# Patient Record
Sex: MALE | Race: WHITE | Employment: OTHER | URBAN - METROPOLITAN AREA
[De-identification: names, ages, dates, MRNs, and addresses within clinical notes are randomized per-mention and may not be internally consistent; named-entity substitution may affect disease eponyms.]

---

## 2017-05-12 ENCOUNTER — OFFICE VISIT (OUTPATIENT)
Dept: NEUROLOGY | Facility: CLINIC | Age: 72
End: 2017-05-12

## 2017-05-12 VITALS
DIASTOLIC BLOOD PRESSURE: 54 MMHG | WEIGHT: 170 LBS | HEIGHT: 68 IN | HEART RATE: 58 BPM | SYSTOLIC BLOOD PRESSURE: 115 MMHG | BODY MASS INDEX: 25.76 KG/M2

## 2017-05-12 DIAGNOSIS — G40.309: Primary | ICD-10-CM

## 2017-05-12 RX ORDER — FAMOTIDINE 20 MG
2000 TABLET ORAL DAILY
COMMUNITY
End: 2020-01-15

## 2017-05-12 RX ORDER — HYDROCODONE BITARTRATE AND ACETAMINOPHEN 5; 325 MG/1; MG/1
1 TABLET ORAL PRN
COMMUNITY
Start: 2017-01-03 | End: 2020-01-15

## 2017-05-12 NOTE — PROGRESS NOTES
INTERVAL HISTORY:  No Sz according to Mr Toney and note from NH.    HISTORY OF PRESENT ILLNESS rev 5/12/17: Josiah Toney has been a patient of mine since the early 1990s.  He has 4  volumesof paper records at [facility].  Initially, when I first began to see him, he was having at least monthly generalized tonic-clonic seizures which required emergency room visits.  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  He was hospitalized at [facility] in 07/ 1992.  At that time, he was also felt to have a possible neurodegenerative disorder.  It is not clear when his seizures actually began, but they have been present for many years.  At that time in 1992, he was also depressed; and Mysoline was discontinued because it might have been contributing to his depression.  He had been on Dilantin and Depakote.  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 at that time.  He had dramatic results with this medication.  His emergency room visits dropped to 0 from an average of roughly 12 per year prior.  We were also able to discontinue his Dilantin, and this may also have been beneficial.  Over the years, Dino has had excellent seizure control; but, in the last few years, he developed a number of medical problems in addition to his epilepsy.      OTHER ILLNESSES/PROCEDURES:     1.  Stroke in 10/2010 which left him with left-sided weakness.     2.  He also developed deep vein thrombosis.     3.  The patient's patent foramen ovale was diagnosed in 10/2010.   4.  He has had kidney stones for which lithotripsy has been done.  This may have been a side effect of zonisamide.      Recently, Coumadin has discontinued.      PREVIOUS EVALUATIONS:     The most recent EEG 07/07/2010 at [facility] showed interictal activity consisting of high-amplitude spike-and-wave  "discharges which were seen bifrontally.  These lasted less than 2 seconds.   MRI scan 07/21/2010 at [facility] revealed prominent cerebral sulci and mild cerebellar atrophy.   Neuropsych testing done on 11/10/1998 revealed a Full Scale IQ of 80.      REVIEW OF SYSTEMS:  Done with Dino, who has very limited speech. He is room- and wheelchair-bound, so it is hard to assess activity.      PHYSICAL EXAMINATION:  Blood pressure 115/54, pulse 58, height 5' 7.91\" (172.5 cm), weight 170 lb (77.1 kg).  Dino has deteriorated quite a bit in the last year.  He is now wheelchair-bound.  He has very marked kyphosis.  He speaks very softly but he has a good grasp on history . But he is more alert and speaks more clearly since last visit.  NEUROLOGICAL EXAMINATION:    MENTAL STATUS:  Speech is limited to short but relevant sentences. Mentally reasonably sharp- Josiah has bilaterally  strong , slightly worse on right.   CRANIAL NERVES:  Extraocular movements are full.  He is not able to fully cooperate with visual field testing.  Facial movements, slight  Right facial central weakness.  Hearing is decreased; has hearing aid in L ear. He is unable to cooperate with gait ; finger nose finger moderate dysmetria.   ASSESSMENT:  Dino has certainly deteriorated in general physical condition over the last year.  However, he has had a great deal of return of motor strength from his stroke.   PLAN:   No med changes.   RTC 9 mo      "

## 2017-05-12 NOTE — MR AVS SNAPSHOT
"              After Visit Summary   2017    Josiah Toney    MRN: [id number]           Patient Information     Date Of Birth          1945        Visit Information        Provider Department      2017 10:00 AM Lindsay Caban MD [facility]         Follow-ups after your visit        Follow-up notes from your care team     Return in about 9 months (around 2018).      Who to contact     Please call your clinic at [phone] to:    Ask questions about your health    Make or cancel appointments    Discuss your medicines    Learn about your test results    Speak to your doctor   If you have compliments or concerns about an experience at your clinic, or if you wish to file a complaint, please contact [facility] Patient Relations at [phone] or email us at [email or web address]         Additional Information About Your Visit        MyChart Information     Generate is an electronic gateway that provides easy, online access to your medical records. With Generate, you can request a clinic appointment, read your test results, renew a prescription or communicate with your care team.     To sign up for [employer] visit the website at [email or web address]   You will be asked to enter the access code listed below, as well as some personal information. Please follow the directions to create your username and password.     Your access code is: [email or web address]  Expires: 8/10/2017 11:48 AM     Your access code will  in 90 days. If you need help or a new code, please contact your [facility] or call [phone] for assistance.        Care EveryWhere ID     This is your Care EveryWhere ID. This could be used by other organizations to access your [facility] medical records  [id number]        Your Vitals Were     Pulse Height BMI (Body Mass Index)             58 5' 7.91\" (172.5 cm) 25.92 kg/m2          Blood Pressure from Last 3 " Encounters:   05/12/17 115/54   11/10/16 118/63   05/16/16 139/66    Weight from Last 3 Encounters:   05/12/17 170 lb (77.1 kg)   09/03/15 185 lb 12.8 oz (84.3 kg)   05/29/15 179 lb 12.8 oz (81.6 kg)              Today, you had the following     No orders found for display         Today's Medication Changes          These changes are accurate as of: 5/12/17 11:48 AM.  If you have any questions, ask your nurse or doctor.               Stop taking these medicines if you haven't already. Please contact your care team if you have questions.     ALBUTEROL IN   Stopped by:  Lindsay Caban MD           calcium-vitamin D 600-400 MG-UNIT per tablet   Commonly known as:  CALTRATE   Stopped by:  Lindsay Caban MD           celeXA 10 MG tablet   Generic drug:  citalopram   Stopped by:  Lindsay Caban MD           COUMADIN 4 MG tablet   Generic drug:  warfarin   Stopped by:  Lindsay Caban MD           ERGOCALCIFEROL PO   Stopped by:  Lindsay Caban MD           FLOMAX PO   Stopped by:  Lindsay Caban MD           fluticasone 50 MCG/ACT spray   Commonly known as:  FLONASE   Stopped by:  Lindsay Caban MD           nystatin Powd   Stopped by:  Lindsay Caban MD           pantoprazole 20 MG EC tablet   Commonly known as:  PROTONIX   Stopped by:  Lindsay Caban MD           saliva substitute Soln solution   Stopped by:  Lindsay Caban MD                    Primary Care Provider Office Phone # Fax #    Shania ZANDRA Wyatt [phone]       [facility] MEDICAL  [facility] [address]        Thank you!     Thank you for choosing [facility]  for your care. Our goal is always to provide you with excellent care. Hearing back from our patients is one way we can continue to improve our services. Please take a few minutes to complete the written survey that you may receive in the mail after your visit with us. Thank you!             Your Updated Medication List - Protect others around you: Learn how to safely  use, store and throw away your medicines at www.disposemymeds.org.          This list is accurate as of: 5/12/17 11:48 AM.  Always use your most recent med list.                   Brand Name Dispense Instructions for use    Calcium Carbonate 200 MG Caps      Take by mouth 2 times daily       glycopyrrolate 1 MG tablet    ROBINUL     Take 1 mg by mouth 3 times daily       HYDROcodone-acetaminophen 5-325 MG per tablet    NORCO     Take 1 tablet by mouth as needed       MIRALAX powder   Generic drug:  polyethylene glycol      Take  by mouth 2 times daily.       perampanel 2 MG tablet    FYCOMPA    60 tablet    Tahe 1 tablet hs for 3 weeks, then 2 tablets HS  (tabs are 2 mg each)       PROSCAR PO      Take 5 mg by mouth daily       RANITIDINE HCL PO          sennosides 8.6 MG tablet    SENOKOT     Take 1 tablet by mouth daily       simvastatin 20 MG tablet    ZOCOR     Take  by mouth At Bedtime.       TYLENOL PO      Take  by mouth. Max dose 4,000 mg per 24 hours       valproic acid 250 MG capsule    DEPAKENE    180 capsule    Take 2 capsules (500mg) by mouth in the morning and 4 capsules (1000mg) by mouth in the evening.       Vitamin D (Cholecalciferol) 1000 UNITS Caps      Take 2,000 Units by mouth daily       ZONEGRAN 100 MG capsule   Generic drug:  zonisamide      Take  by mouth daily. 200 mg am, 300 mg pm.

## 2017-05-12 NOTE — LETTER
2017       RE: Josiah Toney  : 1945   MRN: [id number]      Dear Colleague,    Thank you for referring your patient, Josiah Toney, to the [facility] at [facility]. Please see a copy of my visit note below.         INTERVAL HISTORY:  No Sz according to Mr Toney and note from NH.    HISTORY OF PRESENT ILLNESS rev 17: Josiah Toney has been a patient of mine since the early .  He has 4  volumesof paper records at [facility].  Initially, when I first began to see him, he was having at least monthly generalized tonic-clonic seizures which required emergency room visits.  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  He was hospitalized at [facility] in 1992.  At that time, he was also felt to have a possible neurodegenerative disorder.  It is not clear when his seizures actually began, but they have been present for many years.  At that time in , he was also depressed; and Mysoline was discontinued because it might have been contributing to his depression.  He had been on Dilantin and Depakote.  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 at that time.  He had dramatic results with this medication.  His emergency room visits dropped to 0 from an average of roughly 12 per year prior.  We were also able to discontinue his Dilantin, and this may also have been beneficial.  Over the years, Dino has had excellent seizure control; but, in the last few years, he developed a number of medical problems in addition to his epilepsy.      OTHER ILLNESSES/PROCEDURES:     1.  Stroke in 10/2010 which left him with left-sided weakness.     2.  He also developed deep vein thrombosis.     3.  The patient's patent foramen ovale was diagnosed in 10/2010.   4.  He has had kidney stones  "for which lithotripsy has been done.  This may have been a side effect of zonisamide.      Recently, Coumadin has discontinued.      PREVIOUS EVALUATIONS:     The most recent EEG 07/07/2010 at [facility] showed interictal activity consisting of high-amplitude spike-and-wave discharges which were seen bifrontally.  These lasted less than 2 seconds.   MRI scan 07/21/2010 at [facility] revealed prominent cerebral sulci and mild cerebellar atrophy.   Neuropsych testing done on 11/10/1998 revealed a Full Scale IQ of 80.      REVIEW OF SYSTEMS:  Done with Dino, who has very limited speech. He is room- and wheelchair-bound, so it is hard to assess activity.      PHYSICAL EXAMINATION:  Blood pressure 115/54, pulse 58, height 5' 7.91\" (172.5 cm), weight 170 lb (77.1 kg).  Dino has deteriorated quite a bit in the last year.  He is now wheelchair-bound.  He has very marked kyphosis.  He speaks very softly but he has a good grasp on history . But he is more alert and speaks more clearly since last visit.  NEUROLOGICAL EXAMINATION:    MENTAL STATUS:  Speech is limited to short but relevant sentences. Mentally reasonably sharp- Josiah has bilaterally  strong , slightly worse on right.   CRANIAL NERVES:  Extraocular movements are full.  He is not able to fully cooperate with visual field testing.  Facial movements, slight  Right facial central weakness.  Hearing is decreased; has hearing aid in L ear. He is unable to cooperate with gait ; finger nose finger moderate dysmetria.   ASSESSMENT:  Dino has certainly deteriorated in general physical condition over the last year.  However, he has had a great deal of return of motor strength from his stroke.   PLAN:   No med changes.   RTC 9 mo      Sincerely,    Lindsay Caban MD      "

## 2017-09-26 ENCOUNTER — RECORDS - HEALTHEAST (OUTPATIENT)
Dept: LAB | Facility: CLINIC | Age: 72
End: 2017-09-26

## 2017-09-27 LAB
ALT SERPL W P-5'-P-CCNC: 9 U/L (ref 0–45)
AST SERPL W P-5'-P-CCNC: 17 U/L (ref 0–40)
CHOLEST SERPL-MCNC: 106 MG/DL
FASTING STATUS PATIENT QL REPORTED: YES
HDLC SERPL-MCNC: 34 MG/DL
LDLC SERPL CALC-MCNC: 52 MG/DL
TRIGL SERPL-MCNC: 101 MG/DL

## 2018-01-15 ENCOUNTER — RECORDS - HEALTHEAST (OUTPATIENT)
Dept: LAB | Facility: CLINIC | Age: 73
End: 2018-01-15

## 2018-01-16 LAB
FLUAV AG SPEC QL IA: NORMAL
FLUBV AG SPEC QL IA: NORMAL

## 2018-01-23 ENCOUNTER — RECORDS - HEALTHEAST (OUTPATIENT)
Dept: LAB | Facility: CLINIC | Age: 73
End: 2018-01-23

## 2018-01-24 LAB — INR PPP: 5.89 (ref 0.9–1.1)

## 2018-01-26 ENCOUNTER — RECORDS - HEALTHEAST (OUTPATIENT)
Dept: LAB | Facility: CLINIC | Age: 73
End: 2018-01-26

## 2018-01-27 LAB — INR PPP: 1.54 (ref 0.9–1.1)

## 2018-01-28 ENCOUNTER — RECORDS - HEALTHEAST (OUTPATIENT)
Dept: LAB | Facility: CLINIC | Age: 73
End: 2018-01-28

## 2018-01-29 ENCOUNTER — RECORDS - HEALTHEAST (OUTPATIENT)
Dept: LAB | Facility: CLINIC | Age: 73
End: 2018-01-29

## 2018-01-29 LAB — INR PPP: 1.71 (ref 0.9–1.1)

## 2018-01-30 LAB — INR PPP: 1.93 (ref 0.9–1.1)

## 2018-02-02 ENCOUNTER — RECORDS - HEALTHEAST (OUTPATIENT)
Dept: LAB | Facility: CLINIC | Age: 73
End: 2018-02-02

## 2018-02-05 LAB — INR PPP: 1.15 (ref 0.9–1.1)

## 2018-02-07 ENCOUNTER — RECORDS - HEALTHEAST (OUTPATIENT)
Dept: LAB | Facility: CLINIC | Age: 73
End: 2018-02-07

## 2018-02-07 LAB — INR PPP: 1.24 (ref 0.9–1.1)

## 2018-02-08 ENCOUNTER — RECORDS - HEALTHEAST (OUTPATIENT)
Dept: LAB | Facility: CLINIC | Age: 73
End: 2018-02-08

## 2018-02-09 ENCOUNTER — RECORDS - HEALTHEAST (OUTPATIENT)
Dept: LAB | Facility: CLINIC | Age: 73
End: 2018-02-09

## 2018-02-09 LAB
INR PPP: 1.96 (ref 0.9–1.1)
INSULIN SERPL-ACNC: 10.4 MU/L (ref 3–25)

## 2018-02-10 LAB — INR PPP: 2.22 (ref 0.9–1.1)

## 2018-02-11 ENCOUNTER — RECORDS - HEALTHEAST (OUTPATIENT)
Dept: LAB | Facility: CLINIC | Age: 73
End: 2018-02-11

## 2018-02-12 LAB
ANION GAP SERPL CALCULATED.3IONS-SCNC: 4 MMOL/L (ref 5–18)
BUN SERPL-MCNC: 15 MG/DL (ref 8–28)
CALCIUM SERPL-MCNC: 8.6 MG/DL (ref 8.5–10.5)
CHLORIDE BLD-SCNC: 111 MMOL/L (ref 98–107)
CO2 SERPL-SCNC: 26 MMOL/L (ref 22–31)
CREAT SERPL-MCNC: 0.7 MG/DL (ref 0.7–1.3)
ERYTHROCYTE [DISTWIDTH] IN BLOOD BY AUTOMATED COUNT: 14.1 % (ref 11–14.5)
GFR SERPL CREATININE-BSD FRML MDRD: >60 ML/MIN/1.73M2
GLUCOSE BLD-MCNC: 73 MG/DL (ref 70–125)
HCT VFR BLD AUTO: 38.8 % (ref 40–54)
HGB BLD-MCNC: 12.5 G/DL (ref 14–18)
INR PPP: 1.49 (ref 0.9–1.1)
MCH RBC QN AUTO: 32.8 PG (ref 27–34)
MCHC RBC AUTO-ENTMCNC: 32.2 G/DL (ref 32–36)
MCV RBC AUTO: 102 FL (ref 80–100)
PLATELET # BLD AUTO: 139 THOU/UL (ref 140–440)
PMV BLD AUTO: 10.7 FL (ref 8.5–12.5)
POTASSIUM BLD-SCNC: 4.4 MMOL/L (ref 3.5–5)
RBC # BLD AUTO: 3.81 MILL/UL (ref 4.4–6.2)
SODIUM SERPL-SCNC: 141 MMOL/L (ref 136–145)
WBC: 5 THOU/UL (ref 4–11)

## 2018-02-16 ENCOUNTER — OFFICE VISIT (OUTPATIENT)
Dept: NEUROLOGY | Facility: CLINIC | Age: 73
End: 2018-02-16
Payer: COMMERCIAL

## 2018-02-16 VITALS
SYSTOLIC BLOOD PRESSURE: 115 MMHG | HEART RATE: 76 BPM | HEIGHT: 68 IN | TEMPERATURE: 99.1 F | DIASTOLIC BLOOD PRESSURE: 60 MMHG

## 2018-02-16 DIAGNOSIS — G25.3 MYOCLONUS: ICD-10-CM

## 2018-02-16 DIAGNOSIS — G40.804 OTHER INTRACTABLE EPILEPSY WITHOUT STATUS EPILEPTICUS (H): Primary | ICD-10-CM

## 2018-02-16 RX ORDER — XYLITOL/YERBA SANTA
3 AEROSOL, SPRAY WITH PUMP (ML) MUCOUS MEMBRANE
COMMUNITY
Start: 2017-11-14 | End: 2020-01-15

## 2018-02-16 NOTE — LETTER
2018       RE: Josiah Toney  : 1945   MRN: [id number]      Dear Colleague,    Thank you for referring your patient, Josiah Toney, to the [facility] at [facility]. Please see a copy of my visit note below.         INTERVAL HISTORY:  No Sz according to Mr Toney and note from NH.    HISTORY OF PRESENT ILLNESS rev 18: Josiah Toney has been a patient of mine since the early .  He has 4  volumesof paper records at [facility].  Initially, when I first began to see him, he was having at least monthly generalized tonic-clonic seizures which required emergency room visits.  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  He was hospitalized at [facility] in 1992.  At that time, he was also felt to have a possible neurodegenerative disorder.  It is not clear when his seizures actually began, but they have been present for many years.  At that time in , he was also depressed; and Mysoline was discontinued because it might have been contributing to his depression.  He had been on Dilantin and Depakote.  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 at that time.  He had dramatic results with this medication.  His emergency room visits dropped to 0 from an average of roughly 12 per year prior.  We were also able to discontinue his Dilantin, and this may also have been beneficial.  Over the years, Dino has had excellent seizure control; but, in the last few years, he developed a number of medical problems in addition to his epilepsy.      OTHER ILLNESSES/PROCEDURES:     1.  Stroke in 10/2010 which left him with left-sided weakness.     2.  He also developed deep vein thrombosis.     3.  The patient's patent foramen ovale was diagnosed in 10/2010.   4.  He has had kidney stones  "for which lithotripsy has been done.  This may have been a side effect of zonisamide.      Recently, Coumadin has discontinued.      PREVIOUS EVALUATIONS:     The most recent EEG 07/07/2010 at [facility] showed interictal activity consisting of high-amplitude spike-and-wave discharges which were seen bifrontally.  These lasted less than 2 seconds.   MRI scan 07/21/2010 at [facility] revealed prominent cerebral sulci and mild cerebellar atrophy.   Neuropsych testing done on 11/10/1998 revealed a Full Scale IQ of 80.      REVIEW OF SYSTEMS:  Done with Dino, who has very limited speech. He is room- and wheelchair-bound, so it is hard to assess activity.      PHYSICAL EXAMINATION:  Blood pressure 115/60, pulse 76, temperature 99.1  F (37.3  C), temperature source Temporal, height 5' 7.91\" (172.5 cm).  Dino has deteriorated quite a bit in the last year.  He is now wheelchair-bound.  He has very marked kyphosis.  He speaks very softly but he has a good grasp on history . He is   alert but speech is slow, soft & dysarthric. Now has Shearer catheter.  NEUROLOGICAL EXAMINATION:    MENTAL STATUS:  Speech is limited to short but relevant sentences. Mentally reasonably sharp- Josiah has bilaterally  strong , slightly worse on right.   CRANIAL NERVES:  Extraocular movements are full.  He is not able to fully cooperate with visual field testing.  Facial movements, slight  Right facial central weakness.  Hearing is decreased; has hearing aid in L ear. He is unable to cooperate with gait ; finger nose finger moderate dysmetria.   ASSESSMENT:  Dino has certainly deteriorated in general physical condition over the last year.  However, he has had a great deal of return of motor strength from his stroke and seizures are under good control.   PLAN:   No med changes.   RTC 9 mo      Sincerely,    Lindsay Caban MD      "

## 2018-02-16 NOTE — PROGRESS NOTES
INTERVAL HISTORY:  No Sz according to Mr Toney and note from NH.    HISTORY OF PRESENT ILLNESS rev 2/16/18: Josiah Toney has been a patient of mine since the early 1990s.  He has 4  volumesof paper records at [facility].  Initially, when I first began to see him, he was having at least monthly generalized tonic-clonic seizures which required emergency room visits.  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  He was hospitalized at [facility] in 07/ 1992.  At that time, he was also felt to have a possible neurodegenerative disorder.  It is not clear when his seizures actually began, but they have been present for many years.  At that time in 1992, he was also depressed; and Mysoline was discontinued because it might have been contributing to his depression.  He had been on Dilantin and Depakote.  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 at that time.  He had dramatic results with this medication.  His emergency room visits dropped to 0 from an average of roughly 12 per year prior.  We were also able to discontinue his Dilantin, and this may also have been beneficial.  Over the years, Dino has had excellent seizure control; but, in the last few years, he developed a number of medical problems in addition to his epilepsy.      OTHER ILLNESSES/PROCEDURES:     1.  Stroke in 10/2010 which left him with left-sided weakness.     2.  He also developed deep vein thrombosis.     3.  The patient's patent foramen ovale was diagnosed in 10/2010.   4.  He has had kidney stones for which lithotripsy has been done.  This may have been a side effect of zonisamide.      Recently, Coumadin has discontinued.      PREVIOUS EVALUATIONS:     The most recent EEG 07/07/2010 at [facility] showed interictal activity consisting of high-amplitude spike-and-wave  "discharges which were seen bifrontally.  These lasted less than 2 seconds.   MRI scan 07/21/2010 at [facility] revealed prominent cerebral sulci and mild cerebellar atrophy.   Neuropsych testing done on 11/10/1998 revealed a Full Scale IQ of 80.      REVIEW OF SYSTEMS:  Done with Dino, who has very limited speech. He is room- and wheelchair-bound, so it is hard to assess activity.      PHYSICAL EXAMINATION:  Blood pressure 115/60, pulse 76, temperature 99.1  F (37.3  C), temperature source Temporal, height 5' 7.91\" (172.5 cm).  Dino has deteriorated quite a bit in the last year.  He is now wheelchair-bound.  He has very marked kyphosis.  He speaks very softly but he has a good grasp on history . He is   alert but speech is slow, soft & dysarthric. Now has Shearer catheter.  NEUROLOGICAL EXAMINATION:    MENTAL STATUS:  Speech is limited to short but relevant sentences. Mentally reasonably sharp- Josiah has bilaterally  strong , slightly worse on right.   CRANIAL NERVES:  Extraocular movements are full.  He is not able to fully cooperate with visual field testing.  Facial movements, slight  Right facial central weakness.  Hearing is decreased; has hearing aid in L ear. He is unable to cooperate with gait ; finger nose finger moderate dysmetria.   ASSESSMENT:  Dino has certainly deteriorated in general physical condition over the last year.  However, he has had a great deal of return of motor strength from his stroke and seizures are under good control.   PLAN:   No med changes.   RTC 9 mo      "

## 2018-02-16 NOTE — MR AVS SNAPSHOT
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)    -  1       Follow-ups after your visit        Future tests that were ordered for you today     Open Future Orders        Priority Expected Expires Ordered    Valproic Acid Free Routine  2018    Zonisamide Level Quantitative Routine  2018            Who to contact     Please call your clinic at [phone] to:    Ask questions about your health    Make or cancel appointments    Discuss your medicines    Learn about your test results    Speak to your doctor            Additional Information About Your Visit        MyChart Information     [employer]Stream is an electronic gateway that provides easy, online access to your medical records. With [employer]Stream, you can request a clinic appointment, read your test results, renew a prescription or communicate with your care team.     To sign up for [employer]Stream visit the website at [email or web address]   You will be asked to enter the access code listed below, as well as some personal information. Please follow the directions to create your username and password.     Your access code is: [email or web address]  Expires: 2018  3:43 PM     Your access code will  in 90 days. If you need help or a new code, please contact your [facility] or call [phone] for assistance.        Care EveryWhere ID     This is your Care EveryWhere ID. This could be used by other organizations to access your [place] medical records  [id number]        Your Vitals Were     Pulse Temperature Height             76 99.1  F (37.3  C) (Temporal) 5' 7.91\" (172.5 cm)          Blood Pressure from Last 3 " Encounters:   02/16/18 115/60   05/12/17 115/54   11/10/16 118/63    Weight from Last 3 Encounters:   05/12/17 170 lb (77.1 kg)   09/03/15 185 lb 12.8 oz (84.3 kg)   05/29/15 179 lb 12.8 oz (81.6 kg)               Primary Care Provider Office Phone # Fax #    Shania Wyatt [phone]       [facility]  [address] [facility] [address]        Equal Access to Services     [facility]: Hadii aad ku hadasho Soomaali, waaxda luqadaha, qaybta kaalmada adeegyada, khris martin . So [facility] [phone].    ATENCIÓN: Si habla español, tiene a wilkerson disposición servicios gratuitos de asistencia lingüística. [facility] [phone].    We comply with applicable federal civil rights laws and [place] laws. We do not discriminate on the basis of race, color, national origin, age, disability, sex, sexual orientation, or gender identity.            Thank you!     Thank you for choosing [facility]  for your care. Our goal is always to provide you with excellent care. Hearing back from our patients is one way we can continue to improve our services. Please take a few minutes to complete the written survey that you may receive in the mail after your visit with us. Thank you!             Your Updated Medication List - Protect others around you: Learn how to safely use, store and throw away your medicines at www.disposemymeds.org.          This list is accurate as of 2/16/18  3:43 PM.  Always use your most recent med list.                   Brand Name Dispense Instructions for use Diagnosis    Calcium Carbonate 200 MG Caps      Take by mouth 2 times daily        glycopyrrolate 1 MG tablet    ROBINUL     Take 1 mg by mouth 3 times daily    Other forms of epilepsy and recurrent seizures with intractable epilepsy       HYDROcodone-acetaminophen 5-325 MG per tablet    NORCO     Take 1 tablet by mouth as needed        MIRALAX powder   Generic drug:  polyethylene  glycol      Take  by mouth 2 times daily.        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        TYLENOL PO      Take  by mouth. Max dose 4,000 mg per 24 hours        valproic acid 250 MG capsule    DEPAKENE    180 capsule    Take 2 capsules (500mg) by mouth in the morning and 4 capsules (1000mg) by mouth in the evening.    Other forms of epilepsy and recurrent seizures with intractable epilepsy       Vitamin D (Cholecalciferol) 1000 UNITS Caps      Take 2,000 Units by mouth daily        ZONEGRAN 100 MG capsule   Generic drug:  zonisamide      Take  by mouth daily. 200 mg am, 300 mg pm.

## 2018-03-07 ENCOUNTER — RECORDS - HEALTHEAST (OUTPATIENT)
Dept: LAB | Facility: CLINIC | Age: 73
End: 2018-03-07

## 2018-03-08 ENCOUNTER — RECORDS - HEALTHEAST (OUTPATIENT)
Dept: LAB | Facility: CLINIC | Age: 73
End: 2018-03-08

## 2018-03-08 LAB
ALBUMIN SERPL-MCNC: 2.5 G/DL (ref 3.5–5)
ALP SERPL-CCNC: 55 U/L (ref 45–120)
ALT SERPL W P-5'-P-CCNC: <9 U/L (ref 0–45)
ANION GAP SERPL CALCULATED.3IONS-SCNC: 6 MMOL/L (ref 5–18)
AST SERPL W P-5'-P-CCNC: 12 U/L (ref 0–40)
BILIRUB DIRECT SERPL-MCNC: 0.2 MG/DL
BILIRUB SERPL-MCNC: 0.4 MG/DL (ref 0–1)
BUN SERPL-MCNC: 18 MG/DL (ref 8–28)
CALCIUM SERPL-MCNC: 9 MG/DL (ref 8.5–10.5)
CHLORIDE BLD-SCNC: 112 MMOL/L (ref 98–107)
CO2 SERPL-SCNC: 25 MMOL/L (ref 22–31)
CREAT SERPL-MCNC: 0.73 MG/DL (ref 0.7–1.3)
ERYTHROCYTE [DISTWIDTH] IN BLOOD BY AUTOMATED COUNT: 14.2 % (ref 11–14.5)
GFR SERPL CREATININE-BSD FRML MDRD: >60 ML/MIN/1.73M2
GLUCOSE BLD-MCNC: 83 MG/DL (ref 70–125)
HCT VFR BLD AUTO: 38.4 % (ref 40–54)
HGB BLD-MCNC: 12.6 G/DL (ref 14–18)
MCH RBC QN AUTO: 33.5 PG (ref 27–34)
MCHC RBC AUTO-ENTMCNC: 32.8 G/DL (ref 32–36)
MCV RBC AUTO: 102 FL (ref 80–100)
PLATELET # BLD AUTO: 149 THOU/UL (ref 140–440)
PMV BLD AUTO: 11.5 FL (ref 8.5–12.5)
POTASSIUM BLD-SCNC: 3.7 MMOL/L (ref 3.5–5)
PROT SERPL-MCNC: 6.6 G/DL (ref 6–8)
RBC # BLD AUTO: 3.76 MILL/UL (ref 4.4–6.2)
SODIUM SERPL-SCNC: 143 MMOL/L (ref 136–145)
WBC: 6 THOU/UL (ref 4–11)

## 2018-03-09 LAB
ANION GAP SERPL CALCULATED.3IONS-SCNC: 5 MMOL/L (ref 5–18)
BASOPHILS # BLD AUTO: 0 THOU/UL (ref 0–0.2)
BASOPHILS NFR BLD AUTO: 0 % (ref 0–2)
BUN SERPL-MCNC: 18 MG/DL (ref 8–28)
CALCIUM SERPL-MCNC: 8.2 MG/DL (ref 8.5–10.5)
CHLORIDE BLD-SCNC: 110 MMOL/L (ref 98–107)
CO2 SERPL-SCNC: 26 MMOL/L (ref 22–31)
CREAT SERPL-MCNC: 0.67 MG/DL (ref 0.7–1.3)
EOSINOPHIL # BLD AUTO: 0.1 THOU/UL (ref 0–0.4)
EOSINOPHIL NFR BLD AUTO: 1 % (ref 0–6)
ERYTHROCYTE [DISTWIDTH] IN BLOOD BY AUTOMATED COUNT: 14.2 % (ref 11–14.5)
GFR SERPL CREATININE-BSD FRML MDRD: >60 ML/MIN/1.73M2
GLUCOSE BLD-MCNC: 80 MG/DL (ref 70–125)
HCT VFR BLD AUTO: 40 % (ref 40–54)
HGB BLD-MCNC: 13.1 G/DL (ref 14–18)
LYMPHOCYTES # BLD AUTO: 0.8 THOU/UL (ref 0.8–4.4)
LYMPHOCYTES NFR BLD AUTO: 17 % (ref 20–40)
MCH RBC QN AUTO: 33.5 PG (ref 27–34)
MCHC RBC AUTO-ENTMCNC: 32.8 G/DL (ref 32–36)
MCV RBC AUTO: 102 FL (ref 80–100)
MONOCYTES # BLD AUTO: 0.4 THOU/UL (ref 0–0.9)
MONOCYTES NFR BLD AUTO: 10 % (ref 2–10)
NEUTROPHILS # BLD AUTO: 3.3 THOU/UL (ref 2–7.7)
NEUTROPHILS NFR BLD AUTO: 73 % (ref 50–70)
PLATELET # BLD AUTO: 145 THOU/UL (ref 140–440)
PMV BLD AUTO: 11.4 FL (ref 8.5–12.5)
POTASSIUM BLD-SCNC: 3.6 MMOL/L (ref 3.5–5)
RBC # BLD AUTO: 3.91 MILL/UL (ref 4.4–6.2)
SODIUM SERPL-SCNC: 141 MMOL/L (ref 136–145)
WBC: 4.6 THOU/UL (ref 4–11)

## 2018-06-14 ENCOUNTER — RECORDS - HEALTHEAST (OUTPATIENT)
Dept: LAB | Facility: CLINIC | Age: 73
End: 2018-06-14

## 2018-06-16 LAB — ZONISAMIDE, S: 20 MCG/ML (ref 10–40)

## 2018-06-17 LAB
SPECIMEN STATUS: NORMAL
VALPROATE FREE SERPL-MCNC: 15.3 UG/ML (ref 6–20)

## 2018-08-31 ENCOUNTER — OFFICE VISIT (OUTPATIENT)
Dept: NEUROLOGY | Facility: CLINIC | Age: 73
End: 2018-08-31
Payer: COMMERCIAL

## 2018-08-31 VITALS — TEMPERATURE: 97.2 F

## 2018-08-31 DIAGNOSIS — G40.419 OTHER GENERALIZED EPILEPSY, INTRACTABLE, WITHOUT STATUS EPILEPTICUS (H): Primary | ICD-10-CM

## 2018-08-31 NOTE — MR AVS SNAPSHOT
After Visit Summary   8/31/2018    Josiah Toney    MRN: [id number]           Patient Information     Date Of Birth          1945        Visit Information        Provider Department      8/31/2018 10:30 AM Lindsay Caban MD [facility]         Follow-ups after your visit        Who to contact     Please call your clinic at [phone] to:    Ask questions about your health    Make or cancel appointments    Discuss your medicines    Learn about your test results    Speak to your doctor            Additional Information About Your Visit        Care EveryWhere ID     This is your Care EveryWhere ID. This could be used by other organizations to access your [place] medical records  [id number]        Your Vitals Were     Temperature                   97.2  F (36.2  C)            Blood Pressure from Last 3 Encounters:   02/16/18 115/60   05/12/17 115/54   11/10/16 118/63    Weight from Last 3 Encounters:   05/12/17 170 lb (77.1 kg)   09/03/15 185 lb 12.8 oz (84.3 kg)   05/29/15 179 lb 12.8 oz (81.6 kg)              Today, you had the following     No orders found for display       Primary Care Provider Office Phone # Fax #    Shania DE PAZ Edmundo [phone]       [facility] MEDICAL  [facility] [address]        Equal Access to Services     VIKTORIYA BUENO : Hadii aad ku hadasho Soomaali, waaxda luqadaha, qaybta kaalmada adeegyada, waxay juan ramon hayvishnun radha martin . So [facility] [phone].    ATENCIÓN: Si habla español, tiene a wilkerson disposición servicios gratgioos de asistencia lingüística. navjot al [phone].    We comply with applicable federal civil rights laws and [place] laws. We do not discriminate on the basis of race, color, national origin, age, disability, sex, sexual orientation, or gender identity.            Thank you!     Thank you for choosing [facility]  for your care. Our goal is always to provide you with excellent  care. Hearing back from our patients is one way we can continue to improve our services. Please take a few minutes to complete the written survey that you may receive in the mail after your visit with us. Thank you!             Your Updated Medication List - Protect others around you: Learn how to safely use, store and throw away your medicines at www.disposemymeds.org.          This list is accurate as of 8/31/18 10:40 AM.  Always use your most recent med list.                   Brand Name Dispense Instructions for use Diagnosis    Calcium Carbonate 200 MG Caps      Take by mouth 2 times daily        glycopyrrolate 1 MG tablet    ROBINUL     Take 1 mg by mouth 3 times daily    Other forms of epilepsy and recurrent seizures with intractable epilepsy       HYDROcodone-acetaminophen 5-325 MG per tablet    NORCO     Take 1 tablet by mouth as needed        MIRALAX powder   Generic drug:  polyethylene glycol      Take  by mouth 2 times daily.        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        TYLENOL PO      Take  by mouth. Max dose 4,000 mg per 24 hours        valproic acid 250 MG capsule    DEPAKENE    180 capsule    Take 2 capsules (500mg) by mouth in the morning and 4 capsules (1000mg) by mouth in the evening.    Other forms of epilepsy and recurrent seizures with intractable epilepsy       Vitamin D (Cholecalciferol) 1000 units Caps      Take 2,000 Units by mouth daily        ZONEGRAN 100 MG capsule   Generic drug:  zonisamide       Take  by mouth daily. 200 mg am, 300 mg pm.

## 2018-08-31 NOTE — PROGRESS NOTES
INTERVAL HISTORY:  No Sz according to Mr Toney      HISTORY OF PRESENT ILLNESS rev 8/31/18: Josiah Toney has been a patient of mine since the early 1990s.  He has 4  volumesof paper records at [facility].  Initially, when I first began to see him, he was having at least monthly generalized tonic-clonic seizures which required emergency room visits.  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  He was hospitalized at [facility] in 07/ 1992.  At that time, he was also felt to have a possible neurodegenerative disorder.  It is not clear when his seizures actually began, but they have been present for many years.  At that time in 1992, he was also depressed; and Mysoline was discontinued because it might have been contributing to his depression.  He had been on Dilantin and Depakote.  However, after a tentative diagnosis of [place] myoclonic epilepsy was made, I was able to obtain zonisamide from [place]; it was not on the market in the [place] at that time.  He had dramatic results with this medication.  His emergency room visits dropped to 0 from an average of roughly 12 per year prior.  We were also able to discontinue his Dilantin, and this may also have been beneficial.  Over the years, Dino has had excellent seizure control; but, in the last few years, he developed a number of medical problems in addition to his epilepsy.      OTHER ILLNESSES/PROCEDURES:     1.  Stroke in 10/2010 which left him with left-sided weakness.     2.  He also developed deep vein thrombosis.     3.  The patient's patent foramen ovale was diagnosed in 10/2010.   4.  He has had kidney stones for which lithotripsy has been done.  This may have been a side effect of zonisamide.      Recently, Coumadin has discontinued.      PREVIOUS EVALUATIONS:     The most recent EEG 07/07/2010 at [facility] showed interictal activity consisting of high-amplitude spike-and-wave discharges which were  seen bifrontally.  These lasted less than 2 seconds.   MRI scan 07/21/2010 at [facility] revealed prominent cerebral sulci and mild cerebellar atrophy.   Neuropsych testing done on 11/10/1998 revealed a Full Scale IQ of 80.      REVIEW OF SYSTEMS:  Done with Dino, who has very limited speech. He is room- and wheelchair-bound, so it is hard to assess activity.      PHYSICAL EXAMINATION:  Temperature 97.2  F (36.2  C).Well groomed, new haircut, well shaved.  Dino has deteriorated quite a bit in the last year.  He is now wheelchair-bound.  He has very marked kyphosis.  He speaks very softly but he has a good grasp on his needs . He is   alert but speech is slow, soft & dysarthric. Now has Shearer catheter.  NEUROLOGICAL EXAMINATION:    MENTAL STATUS:  Speech is limited to short but relevant sentences. Mentally reasonably sharp- Josiah has bilaterally  strong , slightly worse on right.   CRANIAL NERVES:  Extraocular movements are full.  He is not able to fully cooperate with visual field testing.  Facial movements, slight  Right facial central weakness.  Hearing is decreased; has hearing aid in L ear. He is unable to cooperate with gait ; finger nose finger moderate dysmetria.   ASSESSMENT:  Dino has certainly deteriorated in general physical condition over the last year.  However, he has had a great deal of return of motor strength from his stroke and seizures are under good control.   PLAN:   No med changes.   RTC 9 mo

## 2018-08-31 NOTE — LETTER
2018     RE: Josiah Toney  : 1945   MRN: [id number]      Dear Colleague,    Thank you for referring your patient, Josiah Toney, to the [facility] at [facility]. Please see a copy of my visit note below.         INTERVAL HISTORY:  No Sz according to Mr Toney      HISTORY OF PRESENT ILLNESS rev 18: Josiah Toney has been a patient of mine since the early .  He has 4  volumesof paper records at [facility].  Initially, when I first began to see him, he was having at least monthly generalized tonic-clonic seizures which required emergency room visits.  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  He was hospitalized at [facility] in 1992.  At that time, he was also felt to have a possible neurodegenerative disorder.  It is not clear when his seizures actually began, but they have been present for many years.  At that time in , he was also depressed; and Mysoline was discontinued because it might have been contributing to his depression.  He had been on Dilantin and Depakote.  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 at that time.  He had dramatic results with this medication.  His emergency room visits dropped to 0 from an average of roughly 12 per year prior.  We were also able to discontinue his Dilantin, and this may also have been beneficial.  Over the years, Dino has had excellent seizure control; but, in the last few years, he developed a number of medical problems in addition to his epilepsy.      OTHER ILLNESSES/PROCEDURES:     1.  Stroke in 10/2010 which left him with left-sided weakness.     2.  He also developed deep vein thrombosis.     3.  The patient's patent foramen ovale was diagnosed in 10/2010.   4.  He has had kidney stones for which  lithotripsy has been done.  This may have been a side effect of zonisamide.      Recently, Coumadin has discontinued.      PREVIOUS EVALUATIONS:     The most recent EEG 07/07/2010 at [facility] showed interictal activity consisting of high-amplitude spike-and-wave discharges which were seen bifrontally.  These lasted less than 2 seconds.   MRI scan 07/21/2010 at [facility] revealed prominent cerebral sulci and mild cerebellar atrophy.   Neuropsych testing done on 11/10/1998 revealed a Full Scale IQ of 80.      REVIEW OF SYSTEMS:  Done with Dino, who has very limited speech. He is room- and wheelchair-bound, so it is hard to assess activity.      PHYSICAL EXAMINATION:  Temperature 97.2  F (36.2  C).Well groomed, new haircut, well shaved.  Dino has deteriorated quite a bit in the last year.  He is now wheelchair-bound.  He has very marked kyphosis.  He speaks very softly but he has a good grasp on his needs . He is   alert but speech is slow, soft & dysarthric. Now has Shearer catheter.  NEUROLOGICAL EXAMINATION:    MENTAL STATUS:  Speech is limited to short but relevant sentences. Mentally reasonably sharp- Josiah has bilaterally  strong , slightly worse on right.   CRANIAL NERVES:  Extraocular movements are full.  He is not able to fully cooperate with visual field testing.  Facial movements, slight  Right facial central weakness.  Hearing is decreased; has hearing aid in L ear. He is unable to cooperate with gait ; finger nose finger moderate dysmetria.   ASSESSMENT:  Dino has certainly deteriorated in general physical condition over the last year.  However, he has had a great deal of return of motor strength from his stroke and seizures are under good control.   PLAN:   No med changes.   RTC 9 mo    Again, thank you for allowing me to participate in the care of your patient.      Sincerely,    Lindsay Caban MD

## 2018-10-03 ENCOUNTER — RECORDS - HEALTHEAST (OUTPATIENT)
Dept: LAB | Facility: CLINIC | Age: 73
End: 2018-10-03

## 2018-10-04 LAB
ALBUMIN SERPL-MCNC: 2.3 G/DL (ref 3.5–5)
ANION GAP SERPL CALCULATED.3IONS-SCNC: 7 MMOL/L (ref 5–18)
BUN SERPL-MCNC: 17 MG/DL (ref 8–28)
CALCIUM SERPL-MCNC: 8.5 MG/DL (ref 8.5–10.5)
CHLORIDE BLD-SCNC: 113 MMOL/L (ref 98–107)
CHOLEST SERPL-MCNC: 101 MG/DL
CO2 SERPL-SCNC: 22 MMOL/L (ref 22–31)
CREAT SERPL-MCNC: 0.7 MG/DL (ref 0.7–1.3)
ERYTHROCYTE [DISTWIDTH] IN BLOOD BY AUTOMATED COUNT: 14.1 % (ref 11–14.5)
FASTING STATUS PATIENT QL REPORTED: YES
GFR SERPL CREATININE-BSD FRML MDRD: >60 ML/MIN/1.73M2
GLUCOSE BLD-MCNC: 71 MG/DL (ref 70–125)
HCT VFR BLD AUTO: 35 % (ref 40–54)
HDLC SERPL-MCNC: 35 MG/DL
HGB BLD-MCNC: 11.1 G/DL (ref 14–18)
LDLC SERPL CALC-MCNC: 54 MG/DL
MCH RBC QN AUTO: 32.2 PG (ref 27–34)
MCHC RBC AUTO-ENTMCNC: 31.7 G/DL (ref 32–36)
MCV RBC AUTO: 101 FL (ref 80–100)
PLATELET # BLD AUTO: 168 THOU/UL (ref 140–440)
PMV BLD AUTO: 10.6 FL (ref 8.5–12.5)
POTASSIUM BLD-SCNC: 3.9 MMOL/L (ref 3.5–5)
RBC # BLD AUTO: 3.45 MILL/UL (ref 4.4–6.2)
SODIUM SERPL-SCNC: 142 MMOL/L (ref 136–145)
TRIGL SERPL-MCNC: 61 MG/DL
WBC: 4.7 THOU/UL (ref 4–11)

## 2018-10-05 LAB — 25(OH)D3 SERPL-MCNC: 59.7 NG/ML (ref 30–80)

## 2019-01-10 ENCOUNTER — RECORDS - HEALTHEAST (OUTPATIENT)
Dept: LAB | Facility: CLINIC | Age: 74
End: 2019-01-10

## 2019-01-11 LAB — 25(OH)D3 SERPL-MCNC: 62.1 NG/ML (ref 30–80)

## 2019-03-01 ENCOUNTER — RECORDS - HEALTHEAST (OUTPATIENT)
Dept: LAB | Facility: CLINIC | Age: 74
End: 2019-03-01

## 2019-03-01 LAB
ANION GAP SERPL CALCULATED.3IONS-SCNC: 6 MMOL/L (ref 5–18)
BUN SERPL-MCNC: 18 MG/DL (ref 8–28)
CALCIUM SERPL-MCNC: 8.6 MG/DL (ref 8.5–10.5)
CHLORIDE BLD-SCNC: 111 MMOL/L (ref 98–107)
CO2 SERPL-SCNC: 26 MMOL/L (ref 22–31)
CREAT SERPL-MCNC: 0.81 MG/DL (ref 0.7–1.3)
GFR SERPL CREATININE-BSD FRML MDRD: >60 ML/MIN/1.73M2
GLUCOSE BLD-MCNC: 72 MG/DL (ref 70–125)
POTASSIUM BLD-SCNC: 3.8 MMOL/L (ref 3.5–5)
SODIUM SERPL-SCNC: 143 MMOL/L (ref 136–145)

## 2019-06-14 ENCOUNTER — OFFICE VISIT (OUTPATIENT)
Dept: NEUROLOGY | Facility: CLINIC | Age: 74
End: 2019-06-14
Payer: COMMERCIAL

## 2019-06-14 VITALS — HEART RATE: 73 BPM | SYSTOLIC BLOOD PRESSURE: 161 MMHG | DIASTOLIC BLOOD PRESSURE: 69 MMHG

## 2019-06-14 DIAGNOSIS — G40.309 GENERALIZED CONVULSIVE EPILEPSY (H): ICD-10-CM

## 2019-06-14 DIAGNOSIS — G25.3 MYOCLONUS: ICD-10-CM

## 2019-06-14 RX ORDER — VALPROIC ACID 250 MG/1
CAPSULE, LIQUID FILLED ORAL
Qty: 180 CAPSULE | Refills: 11 | Status: SHIPPED | OUTPATIENT
Start: 2019-06-14 | End: 2020-01-15

## 2019-06-14 RX ORDER — ZONISAMIDE 100 MG/1
100 CAPSULE ORAL DAILY
Qty: 150 CAPSULE | Refills: 11 | Status: SHIPPED | OUTPATIENT
Start: 2019-06-14 | End: 2020-01-15

## 2019-06-14 ASSESSMENT — PAIN SCALES - GENERAL: PAINLEVEL: NO PAIN (0)

## 2019-06-14 NOTE — LETTER
2019     RE: Josiah Toney  : 1945   MRN: [id number]      Dear Colleague,    Thank you for referring your patient, Josiah Toney, to the [facility]ARE at [facility]. Please see a copy of my visit note below.         INTERVAL HISTORY:  No Sz according to Mr Toney  And call to nurse at home.    HISTORY OF PRESENT ILLNESS rev 19: Josiah Toney has been a patient of mine since the early .  He has 4  volumesof paper records at [facility].  Initially, when I first began to see him, he was having at least monthly generalized tonic-clonic seizures which required emergency room visits.  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  He was hospitalized at [facility] in 1992.  At that time, he was also felt to have a possible neurodegenerative disorder.  It is not clear when his seizures actually began, but they have been present for many years.  At that time in , he was also depressed; and Mysoline was discontinued because it might have been contributing to his depression.  He had been on Dilantin and Depakote.  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 at that time.  He had dramatic results with this medication.  His emergency room visits dropped to 0 from an average of roughly 12 per year prior.  We were also able to discontinue his Dilantin, and this may also have been beneficial.  Over the years, Dino has had excellent seizure control; but, in the last few years, he developed a number of medical problems in addition to his epilepsy.      OTHER ILLNESSES/PROCEDURES:     1.  Stroke in 10/2010 which left him with left-sided weakness.     2.  He also developed deep vein thrombosis.     3.  The patient's patent foramen ovale was diagnosed in 10/2010.   4.  He has had  kidney stones for which lithotripsy has been done.  This may have been a side effect of zonisamide.      Recently, Coumadin has discontinued.      PREVIOUS EVALUATIONS:     The most recent EEG 07/07/2010 at [facility] showed interictal activity consisting of high-amplitude spike-and-wave discharges which were seen bifrontally.  These lasted less than 2 seconds.   MRI scan 07/21/2010 at [facility] revealed prominent cerebral sulci and mild cerebellar atrophy.   Neuropsych testing done on 11/10/1998 revealed a Full Scale IQ of 80.      REVIEW OF SYSTEMS:  Done with Dino, who has very limited speech. He is room- and wheelchair-bound, so it is hard to assess activity. I spoke with nurse who reports no major issues.     PHYSICAL EXAMINATION:  Blood pressure 161/69, pulse 73.Well groomed, new haircut, well shaved.  Dino has deteriorated quite a bit in the last year.  He is now wheelchair-bound.  He has very marked kyphosis.  He speaks very softly but he has a good grasp on his needs . He is   alert but speech is slow, soft & dysarthric. Now has Shearer catheter.  NEUROLOGICAL EXAMINATION:    MENTAL STATUS:  Speech is limited to short but relevant sentences. Mentally reasonably sharp- Josiah has bilaterally  strong , slightly worse on right.   CRANIAL NERVES:  Extraocular movements are full.  He is not able to fully cooperate with visual field testing.  Facial movements, slight  Right facial central weakness.  Hearing is decreased; has hearing aid in L ear. He is unable to cooperate with gait ; finger nose finger moderate dysmetria.   ASSESSMENT:  Dino has certainly deteriorated in general physical condition over the last year.  However, he has had    return of motor strength from his stroke and seizures are under good control.   PLAN:   No med changes.   RTC 9 mo    Again, thank you for allowing me to participate in the care of your patient.      Sincerely,    Lindsay Caban MD

## 2019-06-14 NOTE — PROGRESS NOTES
INTERVAL HISTORY:  No Sz according to Mr Toney  And call to nurse at home.    HISTORY OF PRESENT ILLNESS rev 6/14/19: Josiah Toney has been a patient of mine since the early 1990s.  He has 4  volumesof paper records at [facility].  Initially, when I first began to see him, he was having at least monthly generalized tonic-clonic seizures which required emergency room visits.  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  He was hospitalized at [facility] in 07/ 1992.  At that time, he was also felt to have a possible neurodegenerative disorder.  It is not clear when his seizures actually began, but they have been present for many years.  At that time in 1992, he was also depressed; and Mysoline was discontinued because it might have been contributing to his depression.  He had been on Dilantin and Depakote.  However, after a tentative diagnosis of [place] myoclonic epilepsy was made, I was able to obtain zonisamide from [place]; it was not on the market in the [place] at that time.  He had dramatic results with this medication.  His emergency room visits dropped to 0 from an average of roughly 12 per year prior.  We were also able to discontinue his Dilantin, and this may also have been beneficial.  Over the years, Dino has had excellent seizure control; but, in the last few years, he developed a number of medical problems in addition to his epilepsy.      OTHER ILLNESSES/PROCEDURES:     1.  Stroke in 10/2010 which left him with left-sided weakness.     2.  He also developed deep vein thrombosis.     3.  The patient's patent foramen ovale was diagnosed in 10/2010.   4.  He has had kidney stones for which lithotripsy has been done.  This may have been a side effect of zonisamide.      Recently, Coumadin has discontinued.      PREVIOUS EVALUATIONS:     The most recent EEG 07/07/2010 at [facility] showed interictal activity consisting of high-amplitude  spike-and-wave discharges which were seen bifrontally.  These lasted less than 2 seconds.   MRI scan 07/21/2010 at [facility] revealed prominent cerebral sulci and mild cerebellar atrophy.   Neuropsych testing done on 11/10/1998 revealed a Full Scale IQ of 80.      REVIEW OF SYSTEMS:  Done with Dino, who has very limited speech. He is room- and wheelchair-bound, so it is hard to assess activity. I spoke with nurse who reports no major issues.     PHYSICAL EXAMINATION:  Blood pressure 161/69, pulse 73.Well groomed, new haircut, well shaved.  Dino has deteriorated quite a bit in the last year.  He is now wheelchair-bound.  He has very marked kyphosis.  He speaks very softly but he has a good grasp on his needs . He is   alert but speech is slow, soft & dysarthric. Now has Shearer catheter.  NEUROLOGICAL EXAMINATION:    MENTAL STATUS:  Speech is limited to short but relevant sentences. Mentally reasonably sharp- Josiah has bilaterally  strong , slightly worse on right.   CRANIAL NERVES:  Extraocular movements are full.  He is not able to fully cooperate with visual field testing.  Facial movements, slight  Right facial central weakness.  Hearing is decreased; has hearing aid in L ear. He is unable to cooperate with gait ; finger nose finger moderate dysmetria.   ASSESSMENT:  Dino has certainly deteriorated in general physical condition over the last year.  However, he has had    return of motor strength from his stroke and seizures are under good control.   PLAN:   No med changes.   RTC 9 mo

## 2019-08-14 ENCOUNTER — RECORDS - HEALTHEAST (OUTPATIENT)
Dept: LAB | Facility: CLINIC | Age: 74
End: 2019-08-14

## 2019-08-15 LAB
25(OH)D3 SERPL-MCNC: 60.2 NG/ML (ref 30–80)
ALBUMIN SERPL-MCNC: 2.5 G/DL (ref 3.5–5)
ALP SERPL-CCNC: 51 U/L (ref 45–120)
ALT SERPL W P-5'-P-CCNC: <9 U/L (ref 0–45)
ANION GAP SERPL CALCULATED.3IONS-SCNC: 4 MMOL/L (ref 5–18)
AST SERPL W P-5'-P-CCNC: 14 U/L (ref 0–40)
BILIRUB DIRECT SERPL-MCNC: 0.1 MG/DL
BILIRUB SERPL-MCNC: 0.3 MG/DL (ref 0–1)
BUN SERPL-MCNC: 14 MG/DL (ref 8–28)
CALCIUM SERPL-MCNC: 8.8 MG/DL (ref 8.5–10.5)
CHLORIDE BLD-SCNC: 111 MMOL/L (ref 98–107)
CO2 SERPL-SCNC: 26 MMOL/L (ref 22–31)
CREAT SERPL-MCNC: 0.73 MG/DL (ref 0.7–1.3)
ERYTHROCYTE [DISTWIDTH] IN BLOOD BY AUTOMATED COUNT: 14 % (ref 11–14.5)
GFR SERPL CREATININE-BSD FRML MDRD: >60 ML/MIN/1.73M2
GLUCOSE BLD-MCNC: 68 MG/DL (ref 70–125)
HCT VFR BLD AUTO: 36.8 % (ref 40–54)
HGB BLD-MCNC: 11.9 G/DL (ref 14–18)
MCH RBC QN AUTO: 33.1 PG (ref 27–34)
MCHC RBC AUTO-ENTMCNC: 32.3 G/DL (ref 32–36)
MCV RBC AUTO: 102 FL (ref 80–100)
PLATELET # BLD AUTO: 120 THOU/UL (ref 140–440)
PMV BLD AUTO: 10.3 FL (ref 8.5–12.5)
POTASSIUM BLD-SCNC: 4.1 MMOL/L (ref 3.5–5)
PROT SERPL-MCNC: 6.5 G/DL (ref 6–8)
RBC # BLD AUTO: 3.6 MILL/UL (ref 4.4–6.2)
SODIUM SERPL-SCNC: 141 MMOL/L (ref 136–145)
WBC: 3.9 THOU/UL (ref 4–11)

## 2019-08-30 ENCOUNTER — RECORDS - HEALTHEAST (OUTPATIENT)
Dept: LAB | Facility: CLINIC | Age: 74
End: 2019-08-30

## 2019-09-03 LAB
ANION GAP SERPL CALCULATED.3IONS-SCNC: 6 MMOL/L (ref 5–18)
BASOPHILS # BLD AUTO: 0 THOU/UL (ref 0–0.2)
BASOPHILS NFR BLD AUTO: 1 % (ref 0–2)
BUN SERPL-MCNC: 17 MG/DL (ref 8–28)
CALCIUM SERPL-MCNC: 8.5 MG/DL (ref 8.5–10.5)
CHLORIDE BLD-SCNC: 110 MMOL/L (ref 98–107)
CO2 SERPL-SCNC: 20 MMOL/L (ref 22–31)
CREAT SERPL-MCNC: 1.01 MG/DL (ref 0.7–1.3)
EOSINOPHIL # BLD AUTO: 0.1 THOU/UL (ref 0–0.4)
EOSINOPHIL NFR BLD AUTO: 4 % (ref 0–6)
ERYTHROCYTE [DISTWIDTH] IN BLOOD BY AUTOMATED COUNT: 14.6 % (ref 11–14.5)
GFR SERPL CREATININE-BSD FRML MDRD: >60 ML/MIN/1.73M2
GLUCOSE BLD-MCNC: 64 MG/DL (ref 70–125)
HCT VFR BLD AUTO: 29.9 % (ref 40–54)
HGB BLD-MCNC: 9.6 G/DL (ref 14–18)
LYMPHOCYTES # BLD AUTO: 1.4 THOU/UL (ref 0.8–4.4)
LYMPHOCYTES NFR BLD AUTO: 48 % (ref 20–40)
MCH RBC QN AUTO: 33 PG (ref 27–34)
MCHC RBC AUTO-ENTMCNC: 32.1 G/DL (ref 32–36)
MCV RBC AUTO: 103 FL (ref 80–100)
MONOCYTES # BLD AUTO: 0.4 THOU/UL (ref 0–0.9)
MONOCYTES NFR BLD AUTO: 12 % (ref 2–10)
NEUTROPHILS # BLD AUTO: 1 THOU/UL (ref 2–7.7)
NEUTROPHILS NFR BLD AUTO: 34 % (ref 50–70)
PLATELET # BLD AUTO: 181 THOU/UL (ref 140–440)
PMV BLD AUTO: 9.7 FL (ref 8.5–12.5)
POTASSIUM BLD-SCNC: 4.4 MMOL/L (ref 3.5–5)
RBC # BLD AUTO: 2.91 MILL/UL (ref 4.4–6.2)
SODIUM SERPL-SCNC: 136 MMOL/L (ref 136–145)
WBC: 3 THOU/UL (ref 4–11)

## 2019-10-13 ENCOUNTER — RECORDS - HEALTHEAST (OUTPATIENT)
Dept: LAB | Facility: CLINIC | Age: 74
End: 2019-10-13

## 2019-10-13 LAB
ALBUMIN UR-MCNC: ABNORMAL MG/DL
ANION GAP SERPL CALCULATED.3IONS-SCNC: 8 MMOL/L (ref 5–18)
APPEARANCE UR: ABNORMAL
BACTERIA #/AREA URNS HPF: ABNORMAL HPF
BASOPHILS # BLD AUTO: 0 THOU/UL (ref 0–0.2)
BASOPHILS NFR BLD AUTO: 0 % (ref 0–2)
BILIRUB UR QL STRIP: NEGATIVE
BUN SERPL-MCNC: 17 MG/DL (ref 8–28)
CALCIUM SERPL-MCNC: 8.7 MG/DL (ref 8.5–10.5)
CHLORIDE BLD-SCNC: 112 MMOL/L (ref 98–107)
CO2 SERPL-SCNC: 20 MMOL/L (ref 22–31)
COLOR UR AUTO: ABNORMAL
CREAT SERPL-MCNC: 0.79 MG/DL (ref 0.7–1.3)
EOSINOPHIL # BLD AUTO: 0 THOU/UL (ref 0–0.4)
EOSINOPHIL NFR BLD AUTO: 0 % (ref 0–6)
ERYTHROCYTE [DISTWIDTH] IN BLOOD BY AUTOMATED COUNT: 14.6 % (ref 11–14.5)
GFR SERPL CREATININE-BSD FRML MDRD: >60 ML/MIN/1.73M2
GLUCOSE BLD-MCNC: 96 MG/DL (ref 70–125)
GLUCOSE UR STRIP-MCNC: NEGATIVE MG/DL
HCT VFR BLD AUTO: 39 % (ref 40–54)
HGB BLD-MCNC: 12.5 G/DL (ref 14–18)
HGB UR QL STRIP: ABNORMAL
KETONES UR STRIP-MCNC: ABNORMAL MG/DL
LEUKOCYTE ESTERASE UR QL STRIP: ABNORMAL
LYMPHOCYTES # BLD AUTO: 0.3 THOU/UL (ref 0.8–4.4)
LYMPHOCYTES NFR BLD AUTO: 2 % (ref 20–40)
MCH RBC QN AUTO: 32.9 PG (ref 27–34)
MCHC RBC AUTO-ENTMCNC: 32.1 G/DL (ref 32–36)
MCV RBC AUTO: 103 FL (ref 80–100)
MONOCYTES # BLD AUTO: 1 THOU/UL (ref 0–0.9)
MONOCYTES NFR BLD AUTO: 7 % (ref 2–10)
MUCOUS THREADS #/AREA URNS LPF: ABNORMAL LPF
NEUTROPHILS # BLD AUTO: 13.1 THOU/UL (ref 2–7.7)
NEUTROPHILS NFR BLD AUTO: 91 % (ref 50–70)
NITRATE UR QL: NEGATIVE
PH UR STRIP: 8 [PH] (ref 4.5–8)
PLATELET # BLD AUTO: 138 THOU/UL (ref 140–440)
PMV BLD AUTO: 11.1 FL (ref 8.5–12.5)
POTASSIUM BLD-SCNC: 3.6 MMOL/L (ref 3.5–5)
RBC # BLD AUTO: 3.8 MILL/UL (ref 4.4–6.2)
RBC #/AREA URNS AUTO: >100 HPF
SODIUM SERPL-SCNC: 140 MMOL/L (ref 136–145)
SP GR UR STRIP: 1.01 (ref 1–1.03)
SQUAMOUS #/AREA URNS AUTO: ABNORMAL LPF
TRI-PHOS CRY #/AREA URNS HPF: PRESENT /[HPF]
UROBILINOGEN UR STRIP-ACNC: ABNORMAL
WBC #/AREA URNS AUTO: ABNORMAL HPF
WBC CLUMPS #/AREA URNS HPF: PRESENT /[HPF]
WBC: 14.4 THOU/UL (ref 4–11)

## 2019-10-15 ENCOUNTER — RECORDS - HEALTHEAST (OUTPATIENT)
Dept: LAB | Facility: CLINIC | Age: 74
End: 2019-10-15

## 2019-10-15 LAB
ANION GAP SERPL CALCULATED.3IONS-SCNC: 6 MMOL/L (ref 5–18)
BACTERIA SPEC CULT: ABNORMAL
BASOPHILS # BLD AUTO: 0 THOU/UL (ref 0–0.2)
BASOPHILS NFR BLD AUTO: 0 % (ref 0–2)
BUN SERPL-MCNC: 17 MG/DL (ref 8–28)
CALCIUM SERPL-MCNC: 8.5 MG/DL (ref 8.5–10.5)
CHLORIDE BLD-SCNC: 109 MMOL/L (ref 98–107)
CO2 SERPL-SCNC: 25 MMOL/L (ref 22–31)
CREAT SERPL-MCNC: 0.75 MG/DL (ref 0.7–1.3)
EOSINOPHIL # BLD AUTO: 0 THOU/UL (ref 0–0.4)
EOSINOPHIL NFR BLD AUTO: 1 % (ref 0–6)
ERYTHROCYTE [DISTWIDTH] IN BLOOD BY AUTOMATED COUNT: 14.6 % (ref 11–14.5)
GFR SERPL CREATININE-BSD FRML MDRD: >60 ML/MIN/1.73M2
GLUCOSE BLD-MCNC: 62 MG/DL (ref 70–125)
HCT VFR BLD AUTO: 36.3 % (ref 40–54)
HGB BLD-MCNC: 11.4 G/DL (ref 14–18)
LYMPHOCYTES # BLD AUTO: 1 THOU/UL (ref 0.8–4.4)
LYMPHOCYTES NFR BLD AUTO: 22 % (ref 20–40)
MCH RBC QN AUTO: 32.8 PG (ref 27–34)
MCHC RBC AUTO-ENTMCNC: 31.4 G/DL (ref 32–36)
MCV RBC AUTO: 104 FL (ref 80–100)
MONOCYTES # BLD AUTO: 0.6 THOU/UL (ref 0–0.9)
MONOCYTES NFR BLD AUTO: 13 % (ref 2–10)
NEUTROPHILS # BLD AUTO: 2.9 THOU/UL (ref 2–7.7)
NEUTROPHILS NFR BLD AUTO: 63 % (ref 50–70)
PLATELET # BLD AUTO: 110 THOU/UL (ref 140–440)
PMV BLD AUTO: 11.1 FL (ref 8.5–12.5)
POTASSIUM BLD-SCNC: 3.5 MMOL/L (ref 3.5–5)
RBC # BLD AUTO: 3.48 MILL/UL (ref 4.4–6.2)
SODIUM SERPL-SCNC: 140 MMOL/L (ref 136–145)
WBC: 4.6 THOU/UL (ref 4–11)

## 2020-01-09 ENCOUNTER — RECORDS - HEALTHEAST (OUTPATIENT)
Dept: LAB | Facility: CLINIC | Age: 75
End: 2020-01-09

## 2020-01-09 LAB
ALBUMIN UR-MCNC: ABNORMAL MG/DL
AMORPH CRY #/AREA URNS HPF: ABNORMAL /[HPF]
APPEARANCE UR: ABNORMAL
BACTERIA #/AREA URNS HPF: ABNORMAL HPF
BILIRUB UR QL STRIP: NEGATIVE
COLOR UR AUTO: YELLOW
GLUCOSE UR STRIP-MCNC: NEGATIVE MG/DL
HGB UR QL STRIP: ABNORMAL
KETONES UR STRIP-MCNC: NEGATIVE MG/DL
LEUKOCYTE ESTERASE UR QL STRIP: ABNORMAL
MUCOUS THREADS #/AREA URNS LPF: ABNORMAL LPF
NITRATE UR QL: NEGATIVE
PH UR STRIP: 7.5 [PH] (ref 4.5–8)
RBC #/AREA URNS AUTO: ABNORMAL HPF
SP GR UR STRIP: 1.01 (ref 1–1.03)
SQUAMOUS #/AREA URNS AUTO: ABNORMAL LPF
UROBILINOGEN UR STRIP-ACNC: ABNORMAL
WBC #/AREA URNS AUTO: ABNORMAL HPF
WBC CLUMPS #/AREA URNS HPF: PRESENT /[HPF]

## 2020-01-12 LAB
BACTERIA SPEC CULT: ABNORMAL
BACTERIA SPEC CULT: ABNORMAL

## 2020-01-15 ENCOUNTER — HOSPITAL ENCOUNTER (INPATIENT)
Facility: CLINIC | Age: 75
LOS: 4 days | Discharge: NURSING, REHAB OR LONG-TERM CARE | DRG: 698 | End: 2020-01-19
Attending: EMERGENCY MEDICINE | Admitting: HOSPITALIST
Payer: COMMERCIAL

## 2020-01-15 ENCOUNTER — APPOINTMENT (OUTPATIENT)
Dept: CT IMAGING | Facility: CLINIC | Age: 75
DRG: 698 | End: 2020-01-15
Attending: EMERGENCY MEDICINE
Payer: COMMERCIAL

## 2020-01-15 ENCOUNTER — APPOINTMENT (OUTPATIENT)
Dept: CT IMAGING | Facility: CLINIC | Age: 75
DRG: 698 | End: 2020-01-15
Attending: HOSPITALIST
Payer: COMMERCIAL

## 2020-01-15 DIAGNOSIS — N39.0 URINARY TRACT INFECTION WITHOUT HEMATURIA, SITE UNSPECIFIED: ICD-10-CM

## 2020-01-15 DIAGNOSIS — R33.9 URINARY RETENTION WITH INCOMPLETE BLADDER EMPTYING: Primary | ICD-10-CM

## 2020-01-15 DIAGNOSIS — G40.909 SEIZURE DISORDER (H): ICD-10-CM

## 2020-01-15 DIAGNOSIS — R41.82 ALTERED MENTAL STATUS, UNSPECIFIED ALTERED MENTAL STATUS TYPE: ICD-10-CM

## 2020-01-15 PROBLEM — T83.511A CATHETER-ASSOCIATED URINARY TRACT INFECTION (H): Status: ACTIVE | Noted: 2020-01-15

## 2020-01-15 LAB
ALBUMIN SERPL-MCNC: 2.4 G/DL (ref 3.4–5)
ALBUMIN UR-MCNC: 10 MG/DL
ALP SERPL-CCNC: 65 U/L (ref 40–150)
ALT SERPL W P-5'-P-CCNC: 11 U/L (ref 0–70)
AMMONIA PLAS-SCNC: 37 UMOL/L (ref 10–50)
ANION GAP SERPL CALCULATED.3IONS-SCNC: 6 MMOL/L (ref 3–14)
APPEARANCE UR: ABNORMAL
AST SERPL W P-5'-P-CCNC: 14 U/L (ref 0–45)
BACTERIA #/AREA URNS HPF: ABNORMAL /HPF
BASOPHILS # BLD AUTO: 0 10E9/L (ref 0–0.2)
BASOPHILS NFR BLD AUTO: 0.4 %
BILIRUB SERPL-MCNC: 0.2 MG/DL (ref 0.2–1.3)
BILIRUB UR QL STRIP: NEGATIVE
BUN SERPL-MCNC: 19 MG/DL (ref 7–30)
CALCIUM SERPL-MCNC: 8.3 MG/DL (ref 8.5–10.1)
CHLORIDE SERPL-SCNC: 110 MMOL/L (ref 94–109)
CO2 SERPL-SCNC: 25 MMOL/L (ref 20–32)
COLOR UR AUTO: YELLOW
CREAT SERPL-MCNC: 0.71 MG/DL (ref 0.66–1.25)
DIFFERENTIAL METHOD BLD: ABNORMAL
EOSINOPHIL # BLD AUTO: 0.1 10E9/L (ref 0–0.7)
EOSINOPHIL NFR BLD AUTO: 2.8 %
ERYTHROCYTE [DISTWIDTH] IN BLOOD BY AUTOMATED COUNT: 14.7 % (ref 10–15)
GFR SERPL CREATININE-BSD FRML MDRD: >90 ML/MIN/{1.73_M2}
GLUCOSE SERPL-MCNC: 93 MG/DL (ref 70–99)
GLUCOSE UR STRIP-MCNC: NEGATIVE MG/DL
HCT VFR BLD AUTO: 37.8 % (ref 40–53)
HGB BLD-MCNC: 12.1 G/DL (ref 13.3–17.7)
HGB UR QL STRIP: ABNORMAL
IMM GRANULOCYTES # BLD: 0 10E9/L (ref 0–0.4)
IMM GRANULOCYTES NFR BLD: 0.4 %
KETONES UR STRIP-MCNC: NEGATIVE MG/DL
LEUKOCYTE ESTERASE UR QL STRIP: ABNORMAL
LYMPHOCYTES # BLD AUTO: 2.6 10E9/L (ref 0.8–5.3)
LYMPHOCYTES NFR BLD AUTO: 55.7 %
MCH RBC QN AUTO: 31.9 PG (ref 26.5–33)
MCHC RBC AUTO-ENTMCNC: 32 G/DL (ref 31.5–36.5)
MCV RBC AUTO: 100 FL (ref 78–100)
MONOCYTES # BLD AUTO: 0.4 10E9/L (ref 0–1.3)
MONOCYTES NFR BLD AUTO: 8.4 %
MUCOUS THREADS #/AREA URNS LPF: PRESENT /LPF
NEUTROPHILS # BLD AUTO: 1.5 10E9/L (ref 1.6–8.3)
NEUTROPHILS NFR BLD AUTO: 32.3 %
NITRATE UR QL: NEGATIVE
NRBC # BLD AUTO: 0 10*3/UL
NRBC BLD AUTO-RTO: 0 /100
PH UR STRIP: 7.5 PH (ref 5–7)
PLATELET # BLD AUTO: 165 10E9/L (ref 150–450)
POTASSIUM SERPL-SCNC: 3.9 MMOL/L (ref 3.4–5.3)
PROT SERPL-MCNC: 7.2 G/DL (ref 6.8–8.8)
RBC # BLD AUTO: 3.79 10E12/L (ref 4.4–5.9)
RBC #/AREA URNS AUTO: 5 /HPF (ref 0–2)
SODIUM SERPL-SCNC: 141 MMOL/L (ref 133–144)
SOURCE: ABNORMAL
SP GR UR STRIP: 1.02 (ref 1–1.03)
SQUAMOUS #/AREA URNS AUTO: <1 /HPF (ref 0–1)
TRI-PHOS CRY #/AREA URNS HPF: ABNORMAL /HPF
UROBILINOGEN UR STRIP-MCNC: NORMAL MG/DL (ref 0–2)
WBC # BLD AUTO: 4.7 10E9/L (ref 4–11)
WBC #/AREA URNS AUTO: 78 /HPF (ref 0–5)

## 2020-01-15 PROCEDURE — 25800030 ZZH RX IP 258 OP 636: Performed by: HOSPITALIST

## 2020-01-15 PROCEDURE — 25000132 ZZH RX MED GY IP 250 OP 250 PS 637: Performed by: INTERNAL MEDICINE

## 2020-01-15 PROCEDURE — 12000000 ZZH R&B MED SURG/OB

## 2020-01-15 PROCEDURE — 87040 BLOOD CULTURE FOR BACTERIA: CPT | Performed by: EMERGENCY MEDICINE

## 2020-01-15 PROCEDURE — 70450 CT HEAD/BRAIN W/O DYE: CPT

## 2020-01-15 PROCEDURE — 99222 1ST HOSP IP/OBS MODERATE 55: CPT | Mod: AI | Performed by: HOSPITALIST

## 2020-01-15 PROCEDURE — 87088 URINE BACTERIA CULTURE: CPT | Performed by: EMERGENCY MEDICINE

## 2020-01-15 PROCEDURE — 25000128 H RX IP 250 OP 636: Performed by: EMERGENCY MEDICINE

## 2020-01-15 PROCEDURE — 82140 ASSAY OF AMMONIA: CPT | Performed by: HOSPITALIST

## 2020-01-15 PROCEDURE — 95816 EEG AWAKE AND DROWSY: CPT

## 2020-01-15 PROCEDURE — 96366 THER/PROPH/DIAG IV INF ADDON: CPT

## 2020-01-15 PROCEDURE — 96365 THER/PROPH/DIAG IV INF INIT: CPT

## 2020-01-15 PROCEDURE — 85025 COMPLETE CBC W/AUTO DIFF WBC: CPT | Performed by: EMERGENCY MEDICINE

## 2020-01-15 PROCEDURE — 87186 SC STD MICRODIL/AGAR DIL: CPT | Performed by: EMERGENCY MEDICINE

## 2020-01-15 PROCEDURE — 80053 COMPREHEN METABOLIC PANEL: CPT | Performed by: EMERGENCY MEDICINE

## 2020-01-15 PROCEDURE — 87086 URINE CULTURE/COLONY COUNT: CPT | Performed by: EMERGENCY MEDICINE

## 2020-01-15 PROCEDURE — 81001 URINALYSIS AUTO W/SCOPE: CPT | Performed by: EMERGENCY MEDICINE

## 2020-01-15 PROCEDURE — 74176 CT ABD & PELVIS W/O CONTRAST: CPT

## 2020-01-15 PROCEDURE — 40000061 ZZH STATISTIC EEG TIME EA 10 MIN

## 2020-01-15 PROCEDURE — 99285 EMERGENCY DEPT VISIT HI MDM: CPT | Mod: 25

## 2020-01-15 RX ORDER — ACETAMINOPHEN 325 MG/1
650 TABLET ORAL EVERY 6 HOURS PRN
COMMUNITY

## 2020-01-15 RX ORDER — ZONISAMIDE 100 MG/1
300 CAPSULE ORAL EVERY MORNING
COMMUNITY

## 2020-01-15 RX ORDER — AMOXICILLIN 250 MG
2 CAPSULE ORAL 2 TIMES DAILY PRN
Status: DISCONTINUED | OUTPATIENT
Start: 2020-01-15 | End: 2020-01-19 | Stop reason: HOSPADM

## 2020-01-15 RX ORDER — VALPROIC ACID 250 MG/1
500 CAPSULE, LIQUID FILLED ORAL EVERY MORNING
Status: DISCONTINUED | OUTPATIENT
Start: 2020-01-16 | End: 2020-01-19 | Stop reason: HOSPADM

## 2020-01-15 RX ORDER — BISACODYL 10 MG
10 SUPPOSITORY, RECTAL RECTAL DAILY PRN
Status: DISCONTINUED | OUTPATIENT
Start: 2020-01-15 | End: 2020-01-19 | Stop reason: HOSPADM

## 2020-01-15 RX ORDER — SODIUM CHLORIDE, SODIUM LACTATE, POTASSIUM CHLORIDE, CALCIUM CHLORIDE 600; 310; 30; 20 MG/100ML; MG/100ML; MG/100ML; MG/100ML
INJECTION, SOLUTION INTRAVENOUS CONTINUOUS
Status: DISCONTINUED | OUTPATIENT
Start: 2020-01-15 | End: 2020-01-16

## 2020-01-15 RX ORDER — ONDANSETRON 4 MG/1
4 TABLET, ORALLY DISINTEGRATING ORAL EVERY 6 HOURS PRN
Status: DISCONTINUED | OUTPATIENT
Start: 2020-01-15 | End: 2020-01-19 | Stop reason: HOSPADM

## 2020-01-15 RX ORDER — ACETAMINOPHEN 650 MG/1
650 SUPPOSITORY RECTAL EVERY 4 HOURS PRN
Status: DISCONTINUED | OUTPATIENT
Start: 2020-01-15 | End: 2020-01-19 | Stop reason: HOSPADM

## 2020-01-15 RX ORDER — MULTIPLE VITAMINS W/ MINERALS TAB 9MG-400MCG
1 TAB ORAL DAILY
COMMUNITY
End: 2020-06-07

## 2020-01-15 RX ORDER — VALPROIC ACID 250 MG/1
1000 CAPSULE, LIQUID FILLED ORAL AT BEDTIME
COMMUNITY

## 2020-01-15 RX ORDER — MULTIPLE VITAMINS W/ MINERALS TAB 9MG-400MCG
1 TAB ORAL DAILY
Status: DISCONTINUED | OUTPATIENT
Start: 2020-01-15 | End: 2020-01-19 | Stop reason: HOSPADM

## 2020-01-15 RX ORDER — SIMVASTATIN 10 MG
10 TABLET ORAL AT BEDTIME
COMMUNITY

## 2020-01-15 RX ORDER — ONDANSETRON 2 MG/ML
4 INJECTION INTRAMUSCULAR; INTRAVENOUS EVERY 6 HOURS PRN
Status: DISCONTINUED | OUTPATIENT
Start: 2020-01-15 | End: 2020-01-19 | Stop reason: HOSPADM

## 2020-01-15 RX ORDER — LANOLIN ALCOHOL/MO/W.PET/CERES
3 CREAM (GRAM) TOPICAL
Status: DISCONTINUED | OUTPATIENT
Start: 2020-01-15 | End: 2020-01-19 | Stop reason: HOSPADM

## 2020-01-15 RX ORDER — VALPROIC ACID 250 MG/1
1000 CAPSULE, LIQUID FILLED ORAL AT BEDTIME
Status: DISCONTINUED | OUTPATIENT
Start: 2020-01-15 | End: 2020-01-19 | Stop reason: HOSPADM

## 2020-01-15 RX ORDER — CEFTRIAXONE 2 G/1
2 INJECTION, POWDER, FOR SOLUTION INTRAMUSCULAR; INTRAVENOUS ONCE
Status: COMPLETED | OUTPATIENT
Start: 2020-01-15 | End: 2020-01-15

## 2020-01-15 RX ORDER — AMOXICILLIN 250 MG
1 CAPSULE ORAL 2 TIMES DAILY
Status: DISCONTINUED | OUTPATIENT
Start: 2020-01-15 | End: 2020-01-19 | Stop reason: HOSPADM

## 2020-01-15 RX ORDER — AMOXICILLIN 250 MG
1 CAPSULE ORAL 2 TIMES DAILY
COMMUNITY

## 2020-01-15 RX ORDER — SIMVASTATIN 10 MG
10 TABLET ORAL AT BEDTIME
Status: DISCONTINUED | OUTPATIENT
Start: 2020-01-15 | End: 2020-01-19 | Stop reason: HOSPADM

## 2020-01-15 RX ORDER — CEFTRIAXONE 2 G/1
2 INJECTION, POWDER, FOR SOLUTION INTRAMUSCULAR; INTRAVENOUS EVERY 24 HOURS
Status: DISCONTINUED | OUTPATIENT
Start: 2020-01-16 | End: 2020-01-18

## 2020-01-15 RX ORDER — AMOXICILLIN 250 MG
1 CAPSULE ORAL 2 TIMES DAILY PRN
Status: DISCONTINUED | OUTPATIENT
Start: 2020-01-15 | End: 2020-01-19 | Stop reason: HOSPADM

## 2020-01-15 RX ORDER — ZONISAMIDE 100 MG/1
300 CAPSULE ORAL EVERY MORNING
Status: DISCONTINUED | OUTPATIENT
Start: 2020-01-16 | End: 2020-01-19 | Stop reason: HOSPADM

## 2020-01-15 RX ORDER — VALPROIC ACID 250 MG/1
500 CAPSULE, LIQUID FILLED ORAL EVERY MORNING
COMMUNITY

## 2020-01-15 RX ORDER — FINASTERIDE 5 MG/1
5 TABLET, FILM COATED ORAL DAILY
Status: DISCONTINUED | OUTPATIENT
Start: 2020-01-15 | End: 2020-01-19 | Stop reason: HOSPADM

## 2020-01-15 RX ORDER — ACETAMINOPHEN 325 MG/1
650 TABLET ORAL EVERY 4 HOURS PRN
Status: DISCONTINUED | OUTPATIENT
Start: 2020-01-15 | End: 2020-01-19 | Stop reason: HOSPADM

## 2020-01-15 RX ORDER — NALOXONE HYDROCHLORIDE 0.4 MG/ML
.1-.4 INJECTION, SOLUTION INTRAMUSCULAR; INTRAVENOUS; SUBCUTANEOUS
Status: DISCONTINUED | OUTPATIENT
Start: 2020-01-15 | End: 2020-01-19 | Stop reason: HOSPADM

## 2020-01-15 RX ORDER — POLYETHYLENE GLYCOL 3350 17 G/17G
17 POWDER, FOR SOLUTION ORAL DAILY PRN
Status: DISCONTINUED | OUTPATIENT
Start: 2020-01-15 | End: 2020-01-19 | Stop reason: HOSPADM

## 2020-01-15 RX ORDER — CALCIUM CARBONATE 500 MG/1
1 TABLET, CHEWABLE ORAL 2 TIMES DAILY PRN
COMMUNITY

## 2020-01-15 RX ORDER — LIDOCAINE 40 MG/G
CREAM TOPICAL
Status: DISCONTINUED | OUTPATIENT
Start: 2020-01-15 | End: 2020-01-19 | Stop reason: HOSPADM

## 2020-01-15 RX ADMIN — FINASTERIDE 5 MG: 5 TABLET, FILM COATED ORAL at 19:21

## 2020-01-15 RX ADMIN — PERAMPANEL 4 MG: 4 TABLET ORAL at 21:38

## 2020-01-15 RX ADMIN — SODIUM CHLORIDE, POTASSIUM CHLORIDE, SODIUM LACTATE AND CALCIUM CHLORIDE: 600; 310; 30; 20 INJECTION, SOLUTION INTRAVENOUS at 16:36

## 2020-01-15 RX ADMIN — SIMVASTATIN 10 MG: 10 TABLET, FILM COATED ORAL at 21:38

## 2020-01-15 RX ADMIN — CEFTRIAXONE 2 G: 2 INJECTION, POWDER, FOR SOLUTION INTRAMUSCULAR; INTRAVENOUS at 04:42

## 2020-01-15 RX ADMIN — MULTIPLE VITAMINS W/ MINERALS TAB 1 TABLET: TAB at 19:22

## 2020-01-15 RX ADMIN — VALPROIC ACID 1000 MG: 250 CAPSULE, LIQUID FILLED ORAL at 21:38

## 2020-01-15 RX ADMIN — SENNOSIDES AND DOCUSATE SODIUM 1 TABLET: 8.6; 5 TABLET ORAL at 21:44

## 2020-01-15 RX ADMIN — APIXABAN 2.5 MG: 2.5 TABLET, FILM COATED ORAL at 21:38

## 2020-01-15 ASSESSMENT — ACTIVITIES OF DAILY LIVING (ADL)
ADLS_ACUITY_SCORE: 31
ADLS_ACUITY_SCORE: 22

## 2020-01-15 NOTE — ED NOTES
[facility]  ED Nurse Handoff Report    ED Chief complaint: Altered Mental Status      ED Diagnosis:   Final diagnoses:   Altered mental status, unspecified altered mental status type   Urinary tract infection without hematuria, site unspecified       Code Status:  Admitting MD to assess.      Allergies: No Known Allergies    Patient Story: Local Nursing Home reports as the night has progressed patients level of consciousness has declined. Patient reportedly normally converses normally, however medics report and upon arrival patient not able to produce coherent words.  Focused Assessment:  Patient is alert and oriented x 2, calm and cooperative. VS are stable, skin is warm and dry, respirations are even and non-labored on room air. Patient denied chest pain, shortness of breath, dizziness, and nausea.       Treatments and/or interventions provided: IV ABX  Patient's response to treatments and/or interventions: Improving    To be done/followed up on inpatient unit:  Monitor    Does this patient have any cognitive concerns?: Forgetful and Disoriented to time    Activity level - Baseline/Home:  Total Care  Activity Level - Current:   Total Care    Patient's Preferred language: English   Needed?: No    Isolation: None  Infection: Not Applicable  Bariatric?: No    Vital Signs:   Vitals:    01/15/20 0329   BP: 115/73   Resp: 20   Temp: 97.1  F (36.2  C)   TempSrc: Temporal   SpO2: 95%   Weight: 77.5 kg (170 lb 13.7 oz)       Cardiac Rhythm:     Was the PSS-3 completed:   Yes  What interventions are required if any?               Family Comments: No family present at this time.   OBS brochure/video discussed/provided to patient/family: N/A              Name of person given brochure if not patient: NA              Relationship to patient: NA    For the majority of the shift this patient's behavior was Green.   Behavioral interventions performed were Reassurance.    ED NURSE PHONE NUMBER:  [phone]

## 2020-01-15 NOTE — H&P
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    PLAN  1)  Acute metabolic encephalopathy: Possibly due to CAUTI or stroke leading to expressive aphasia.  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 His Urology care is through [facility]. He has had prior stroke and some prior notes document chronic cognitive deficits. He has cerebral palsy and chronic voiding dysfunction, overflow incontinence, urinary retention, and recurrent UTI's;   on 1/2017 he underwent cystolitholapaxy of bladder material and suprapubic cystotomy with suprapubic tube placement. Most recent Urine Culture from 8/2019 showed Proteus mirabilis resistant to quinolones and Bactrim. He has had recurrent nephrolithiasis requiring prior lithotripsy. Most recent Renal US 9/2019 showed only punctate left sided non-obstructive stones. He last saw his Urologist earlier this month.  Now he presents with one day of progressive encephalopathy characterized by confusion and word finding difficulties. UA from his supra-pubic catheter shows pyuria and triple phosphate crystals. There are no other signs of SIRS. He could have CAUTI. He could have acute stroke or other metabolic cause of confusion.    -- Inpatient. Fall precautions. Q 4 neuro checks. Empiric Ceftriaxone continued. Follow up cultures. Non-contrast CT a/p ordered to rule out staghorn calculi or other pronounced stone burden. Consult Urology if abnormal findings are seen.     "-- Neurology consulted for further evaluation and medication review. MRI Brain and EEG ordered for further evaluation.     -- It seems he may be on Depakote. Will check an Ammonia level       -- Resume Zonisamide when verified. Resume Proscar when verified.        -- he has chronic sialdenitis and contractions. Resume home medications when verified    2) DVT: In the past he has been on systemic anticoagulation. Resume if verified    3) Chronic anemia: Monitor while hospitalized    Chief Complaint   Confusion    Unable to obtain a history from the patient due to confusion; history obtained from the ED physician whom I have spoken with    History of Present Illness   Josiah Toney is a very pleasant 74 year old gentleman who presents with confusion noticed earlier in the day by staff at his facility; constant since onset and progressively worse; characterized as inability to carry on a conversation. Normally he quite able to do so. He tells me he has pain when he tries to void through his catheter. He says he is worried about \"boxes\" being lost. He denies any other acute complaints at this time. History limited by confusion.    In the ED, Blood pressure 115/73, temperature 97.1  F (36.2  C), temperature source Temporal, resp. rate 20, weight 77.5 kg (170 lb 13.7 oz), SpO2 95 %.    CBC showed WBC 4, HGB 12 (11 on 10/2019), . CMP notable for Cl 110, Ca 8.3, Alb 2.4, otherwise in normal reference range. UA showed 78 WBC, 5 RBC, large LE, negative nitrites, and positive for triple phosphates. CT Head without contrast showed only a chronic moderate sized right MCA infarct. He was given Ceftriaxone.    PHYSICAL EXAM  Blood pressure 115/73, temperature 97.1  F (36.2  C), temperature source Temporal, resp. rate 20, weight 77.5 kg (170 lb 13.7 oz), SpO2 95 %.  Constitutional: Awake and alert; speech is confused  HEENT: normocephalic moist mucus membranes  Respiratory: lungs clear to auscultation " bilaterally  Cardiovascular: regular S1 S2   GI: abdomen soft non tender non distended bowel sounds positive  Skin: no rash, good turgor  Musculoskeletal: no clubbing, cyanosis or edema  Neurologic: extra-ocular muscles intact; left sided contractions in LUE  Psychiatric: appropriate affect, word finding difficulties are present     DVT Prophylaxis: Pneumatic Compression Devices  Code Status: Full Code presumed    Disposition: Expected discharge in 2-3 days    Santiago Solorzano MD    Past Medical History    I have reviewed this patient's medical history and updated it with pertinent information if needed.   Past Medical History:   Diagnosis Date     [place] myoclonus epilepsy (H)      Cerebral palsy (H)      Depression      Dyslipidemia      Hypertension      Nephrolithiasis      PFO (patent foramen ovale)      Unspecified cerebral artery occlusion with cerebral infarction 10/10     Urinary retention        Past Surgical History   I have reviewed this patient's surgical history and updated it with pertinent information if needed.  Past Surgical History:   Procedure Laterality Date     ------------OTHER-------------      Cystolithopaxy and suprapubic cystotomy with catheter placement     LITHOTRIPSY  2001       Prior to Admission Medications   Prior to Admission Medications   Prescriptions Last Dose Informant Patient Reported? Taking?   Acetaminophen (TYLENOL PO)   Yes No   Sig: Take  by mouth. Max dose 4,000 mg per 24 hours   Artificial Saliva (MOUTH KOTE) SOLN   Yes No   Sig: 3 sprays   Calcium Carbonate 200 MG CAPS   Yes No   Sig: Take by mouth 2 times daily   Finasteride (PROSCAR PO)   Yes No   Sig: Take 5 mg by mouth daily   HYDROcodone-acetaminophen (NORCO) 5-325 MG per tablet   Yes No   Sig: Take 1 tablet by mouth as needed   RANITIDINE HCL PO   Yes No   Vitamin D, Cholecalciferol, 1000 UNITS CAPS  Nursing Home Yes No   Sig: Take 2,000 Units by mouth daily   glycopyrrolate (ROBINUL) 1 MG tablet   Yes No    Sig: Take 1 mg by mouth 3 times daily   perampanel (FYCOMPA) 2 MG tablet   No No   Sig: Tahe 1 tablet hs for 3 weeks, then 2 tablets HS  (tabs are 2 mg each)   polyethylene glycol (MIRALAX) powder   Yes No   Sig: Take  by mouth 2 times daily.   sennosides (SENOKOT) 8.6 MG tablet   Yes No   Sig: Take 1 tablet by mouth daily   simvastatin (ZOCOR) 20 MG tablet   Yes No   Sig: Take  by mouth At Bedtime.   valproic acid (DEPAKENE) 250 MG capsule   No No   Sig: Take 2 capsules (500mg) by mouth in the morning and 4 capsules (1000mg) by mouth in the evening.   zonisamide (ZONEGRAN) 100 MG capsule   No No   Sig: Take 1 capsule (100 mg) by mouth daily 200 mg am, 300 mg pm      Facility-Administered Medications: None     Allergies   No Known Allergies    Social History   I have reviewed this patient's social history and updated it with pertinent information if needed. Josiah Elizalde Feng  reports that he has never smoked. He has never used smokeless tobacco. He reports previous alcohol use.    Family History   Family history assessed and, except as above, is non-contributory.    Review of Systems   The 10 point Review of Systems is negative other than noted in the HPI or here.     Primary Care Physician   YOSHI GAFFNEY    Data   Labs Ordered and Resulted from Time of ED Arrival Up to the Time of Departure from the ED   CBC WITH PLATELETS DIFFERENTIAL - Abnormal; Notable for the following components:       Result Value    RBC Count 3.79 (*)     Hemoglobin 12.1 (*)     Hematocrit 37.8 (*)     Absolute Neutrophil 1.5 (*)     All other components within normal limits   COMPREHENSIVE METABOLIC PANEL - Abnormal; Notable for the following components:    Chloride 110 (*)     Calcium 8.3 (*)     Albumin 2.4 (*)     All other components within normal limits   ROUTINE UA WITH MICROSCOPIC - Abnormal; Notable for the following components:    Blood Urine Trace (*)     pH Urine 7.5 (*)     Protein Albumin Urine 10 (*)     Leukocyte  Esterase Urine Large (*)     WBC Urine 78 (*)     RBC Urine 5 (*)     Bacteria Urine Few (*)     Mucous Urine Present (*)     Triple Phosphates Few (*)     All other components within normal limits   BLOOD CULTURE   BLOOD CULTURE   URINE CULTURE AEROBIC BACTERIAL       Data reviewed today:  I personally reviewed no images or EKG's today.    Recent Results (from the past 24 hour(s))   CT Head w/o Contrast    Narrative    EXAM: CT HEAD W/O CONTRAST  LOCATION: [facility]  DATE/TIME: 1/15/2020 3:57 AM    INDICATION: Altered level of consciousness (LOC), unexplained  COMPARISON: None.  TECHNIQUE: Routine without IV contrast. Multiplanar reformats. Dose reduction techniques were used.    FINDINGS:  INTRACRANIAL CONTENTS: No acute hemorrhage or mass. Chronic infarct right MCA territory with ex vacuo dilatation of the right lateral ventricle. No evidence for acute infarct. Mild to moderate presumed chronic small vessel ischemic changes. Moderate   generalized volume loss. No hydrocephalus.     VISUALIZED ORBITS/SINUSES/MASTOIDS: No intraorbital abnormality. No paranasal sinus mucosal disease. No middle ear or mastoid effusion.    BONES/SOFT TISSUES: No acute abnormality.      Impression    IMPRESSION:  1.  No acute intracranial process.  2.  Moderate-sized chronic right MCA infarct.

## 2020-01-15 NOTE — CONSULTS
Consult Date:  01/15/2020      CHIEF COMPLAINT:  Evaluate for confusion, possible seizure disorder.      HISTORY OF PRESENT ILLNESS:  Mr. Josiah Toney is a 74-year-old gentleman who presents with confusion.  He has prior history of left MCA stroke, cerebral palsy and [place] myoclonus epilepsy.  The patient has seen Dr. Caban.  He typically takes Depakote and Zonegran with excellent seizure control.  He is admitted to the hospital with evidence of very substantial urinary tract infection.  He overall seems to be better than described in the emergency room.  He gives the history and he tells me that he thinks he may have had a seizure last night.  He states that his legs are weak and he could fall out of bed.  Overall, per chart review, he does have a seizure disorder and recent stroke.  Seizure disorder is controlled with combination of valproic acid and Zonegran.      The patient himself cannot provide any further history.  Reviewing his chart, it shows that his blood culture showed no growth.  His UA shows UTI.  Cultures are pending.  His white blood cell count is 4.7, platelet count 165, hemoglobin 12.1.  He did have CT head on admission, which showed a chronic right MCA infarct which was previously seen.  His labs today for electrolytes showed normal sodium of 141, normal BUN and normal creatinine.  Liver function tests are also within normal limits.  His ammonia was checked, which was 37 and normal.      CURRENT MEDICATIONS:  Ceftriaxone, but at home he is on valproic acid of 1000 mg every morning and 500 mg, Zonegran 300 mg twice a day.  He is also on multivitamins, Fycompa, finasteride, calcium carbonate, apixaban and Tylenol.  Pharmacy is very fine with patient's medications at this point.      ALLERGIES:  NONE PER CHART.      SOCIAL HISTORY:  The patient lives in a nursing home.  He is a nonsmoker, nondrinker.      PAST MEDICAL HISTORY:  Consistent with [place] myoclonus cerebral palsy, depression,  dyslipidemia, hypertension, kidney stones, PFO stroke, urinary retention.      FAMILY HISTORY:  Noncontributory to this presentation.      REVIEW OF SYSTEMS:  Otherwise stable to all chronic conditions.      PHYSICAL EXAMINATION:   VITAL SIGNS:  Blood pressure is 115/61, heart rate 74, respiratory rate 16, temperature 98.4.   GENERAL:  The patient is cooperative, in no acute distress.     NEUROLOGIC:  Tries to follow commands.  His speech is mildly slurred.  The patient has mild bilateral facial weakness.  Gaze is conjugate.  There is no nystagmus.  Pupils are poorly reactive to light.  There is mild weakness in both arms, but patient can move both arms against gravity.  He could lift a little bit his legs, but cannot hold it against gravity.  Reflexes currently are hypoactive.  No clonus is seen.  Coordination shows clumsiness on finger-nose-finger.  Full exam is difficult to perform.      IMPRESSION:  This gentleman presents with increased confusion in the setting of underlying seizure disorder, previous history of stroke and urinary tract infection.  My impression is that he most likely has toxic metabolic encephalopathy associated with urinary tract infection.  We will plan to do MRI of the brain and EEG.  I do not find indications that he really had a seizure at this point, but again workup is pending.  At this point, his anticonvulsants will be continued without any changes of the doses.  Neurology Service will follow.  MRI of the brain was ordered through the emergency room.         NITA PALM MD             D: 01/15/2020   T: 01/15/2020   MT: CATALINA      Name:     HEATHER HAQ   MRN:      [id number]        Account:       [id number]   :      1945           Consult Date:  01/15/2020      Document: [id number]       cc: Shania Wyatt MD

## 2020-01-15 NOTE — PHARMACY-ADMISSION MEDICATION HISTORY
Pharmacy Medication History  Admission medication history interview status for the 1/15/2020  admission is complete. See EPIC admission navigator for prior to admission medications     Medication history sources: Verified medications based off of medication list from [facility]  Medication history source reliability: Moderate - I could not get ahold of nursing home to verify last doses.     Medication reconciliation completed by provider prior to medication history? No    Time spent in this activity: 25 minutes      Prior to Admission medications    Medication Sig Last Dose Taking? Auth Provider   acetaminophen (TYLENOL) 325 MG tablet Take 650 mg by mouth every 6 hours as needed for mild pain prn med Yes Unknown, Entered By History   apixaban ANTICOAGULANT (ELIQUIS) 2.5 MG tablet Take 2.5 mg by mouth 2 times daily Unknown at pm Yes Unknown, Entered By History   calcium carbonate (TUMS) 500 MG chewable tablet Take 1 chew tab by mouth 2 times daily Unknown at Unknown time Yes Unknown, Entered By History   Finasteride (PROSCAR PO) Take 5 mg by mouth daily Unknown at Unknown time Yes Reported, Patient   glycopyrrolate (ROBINUL) 1 MG tablet Take 1 mg by mouth 3 times daily Unknown at Unknown time Yes Reported, Patient   multivitamin w/minerals (MULTI-VITAMIN) tablet Take 1 tablet by mouth daily Unknown at Unknown time Yes Unknown, Entered By History   perampanel (FYCOMPA) 4 MG tablet Take 4 mg by mouth At Bedtime Unknown at pm Yes Unknown, Entered By History   polyethylene glycol (MIRALAX) powder Take 17 g by mouth every morning Mix in 8 oz of liquid. Unknown at am Yes Reported, Patient   senna-docusate (SENOKOT-S/PERICOLACE) 8.6-50 MG tablet Take 1 tablet by mouth 2 times daily Unknown at Unknown time Yes Unknown, Entered By History   simvastatin (ZOCOR) 10 MG tablet Take 10 mg by mouth At Bedtime Unknown at pm Yes Unknown, Entered By History   valproic acid (DEPAKENE) 250 MG capsule Take 500 mg by mouth every  morning Unknown at am Yes Unknown, Entered By History   valproic acid (DEPAKENE) 250 MG capsule Take 1,000 mg by mouth At Bedtime Unknown at hs Yes Unknown, Entered By History   zonisamide (ZONEGRAN) 100 MG capsule Take 300 mg by mouth every morning Unknown at am Yes Unknown, Entered By History

## 2020-01-15 NOTE — ED TRIAGE NOTES
Local Nursing Home reports as the night has progressed patients level of consciousness has declined. Patient reportedly normally converses normally, however medics report and upon arrival patient not able to produce coherent words.

## 2020-01-15 NOTE — PROGRESS NOTES
RECEIVING UNIT ED HANDOFF REVIEW    ED Nurse Handoff Report was reviewed by: Reyna Tillman RN on January 15, 2020 at 1:49 PM     Room is being cleaned

## 2020-01-15 NOTE — PROGRESS NOTES
EEG [id number] completed.  Pt wake awake, not very cooperative, eyes open/closed; no HV/Photic due to health during recording.   EEG ordered by Dr. Solorzano

## 2020-01-15 NOTE — ED NOTES
Brother, Jeff Toney at bedside and updated on patient's admission. Per brother, he is the POA of the patient. Brother also states patient has a regular diet without restrictions.

## 2020-01-15 NOTE — ED PROVIDER NOTES
History     Chief Complaint:    Altered Mental Status       HPI   Josiah Toney is a 74 year old male who presents with altered mental status that progressed throughout the day.  His nursing home reports that he is normally quite talkative, and able to carry on a conversation, but that has become more confused throughout the evening.  For this reason they called EMS.  EMS is unable to give any additional history, and the patient is unable to participate meaningfully in the history as well.  It does not appear as though he is fallen or suffered any trauma, or any focal neurologic deficits.    Allergies:  No Known Allergies     Medications:    Acetaminophen (TYLENOL PO)  Artificial Saliva (MOUTH KOTE) SOLN  Calcium Carbonate 200 MG CAPS  Finasteride (PROSCAR PO)  glycopyrrolate (ROBINUL) 1 MG tablet  HYDROcodone-acetaminophen (NORCO) 5-325 MG per tablet  perampanel (FYCOMPA) 2 MG tablet  polyethylene glycol (MIRALAX) powder  RANITIDINE HCL PO  sennosides (SENOKOT) 8.6 MG tablet  simvastatin (ZOCOR) 20 MG tablet  valproic acid (DEPAKENE) 250 MG capsule  Vitamin D, Cholecalciferol, 1000 UNITS CAPS  zonisamide (ZONEGRAN) 100 MG capsule        Past Medical History:    Past Medical History:   Diagnosis Date     [place] myoclonus epilepsy (H)      Cerebral palsy (H)      Depression      Dyslipidemia      Hypertension      Nephrolithiasis      PFO (patent foramen ovale)      Unspecified cerebral artery occlusion with cerebral infarction 10/10     Urinary retention        Patient Active Problem List    Diagnosis Date Noted     Intractable epilepsy without status epilepticus (H) 05/24/2013     Priority: Medium        Past Surgical History:    Past Surgical History:   Procedure Laterality Date     ------------OTHER-------------      Cystolithopaxy and suprapubic cystotomy with catheter placement     LITHOTRIPSY  2001        Family History:    family history is not on file.    Social History:   reports that he has never  smoked. He has never used smokeless tobacco. He reports previous alcohol use.    PCP: Shania Wyatt     Review of Systems   Unable to perform ROS: Mental status change   All other systems reviewed and are negative.        Physical Exam     Patient Vitals for the past 24 hrs:   BP Temp Temp src Heart Rate Resp SpO2 Weight   01/15/20 0329 115/73 97.1  F (36.2  C) Temporal 95 20 95 % 77.5 kg (170 lb 13.7 oz)        Physical Exam  Vitals: reviewed by me  General: Pt seen on [facility], pleasant, cooperative, and alert to conversation, looking around the room, occasionally will answer questions, but does appear to be confused.  Eyes: Tracking well, clear conjunctiva BL  ENT: MMM, midline trachea.   Lungs:   No tachypnea, no accessory muscle use. No respiratory distress.   CV: Rate as above, regular rhythm.    Abd: Soft, non tender, no guarding, no rebound. Non distended.  Insertion site for his suprapubic catheter is unremarkable, some dried blood noted however.  MSK: no peripheral edema or joint effusion.  No evidence of trauma  Skin: No rash, normal turgor and temperature  Neuro: Clear speech and no facial droop.  Intermittently following commands.  Psych: Not RIS, no e/o AH/VH    Emergency Department Course       Imaging:  CT Head w/o Contrast   Final Result   IMPRESSION:   1.  No acute intracranial process.   2.  Moderate-sized chronic right MCA infarct.           Laboratory:  Labs Ordered and Resulted from Time of ED Arrival Up to the Time of Departure from the ED   CBC WITH PLATELETS DIFFERENTIAL - Abnormal; Notable for the following components:       Result Value    RBC Count 3.79 (*)     Hemoglobin 12.1 (*)     Hematocrit 37.8 (*)     Absolute Neutrophil 1.5 (*)     All other components within normal limits   COMPREHENSIVE METABOLIC PANEL - Abnormal; Notable for the following components:    Chloride 110 (*)     Calcium 8.3 (*)     Albumin 2.4 (*)     All other components within normal limits   ROUTINE  UA WITH MICROSCOPIC - Abnormal; Notable for the following components:    Blood Urine Trace (*)     pH Urine 7.5 (*)     Protein Albumin Urine 10 (*)     Leukocyte Esterase Urine Large (*)     WBC Urine 78 (*)     RBC Urine 5 (*)     Bacteria Urine Few (*)     Mucous Urine Present (*)     Triple Phosphates Few (*)     All other components within normal limits   BLOOD CULTURE   BLOOD CULTURE   URINE CULTURE AEROBIC BACTERIAL        Interventions:  Medications   cefTRIAXone (ROCEPHIN) 2 g vial to attach to  ml bag for ADULTS or NS 50 ml bag for PEDS (2 g Intravenous New Bag 1/15/20 8882)        Emergency Department Course:  Past medical records, nursing notes, and vitals reviewed.  I performed an exam of the patient and obtained history, as documented above.      Impression & Plan      Medical Decision Making:  This is a 74-year-old gentleman who presents the emergency room with appears to be metabolic encephalopathy.  He is moving all of his extremity spontaneously and following commands for the most part.  He does have a suprapubic catheter, and what looks to be a UTI, which certainly could be causing some neurologic changes in his previously damaged CNS, with a large MCA stroke he has had.  He is protecting his airway here, actually looks to be improving with fluids only, but I have ordered antibiotics, and I will be admitting to the care of .  Patient has a normal CT scan of the head, and I do suspect his main issue is a UTI.  Will plan for admission as above.    Diagnosis:    ICD-10-CM    1. Altered mental status, unspecified altered mental status type R41.82 Blood culture     Blood culture     Urine Culture   2. Urinary tract infection without hematuria, site unspecified N39.0         Discharge Medications:  New Prescriptions    No medications on file        1/15/2020   David Angulo*        David Angulo MD  01/15/20 7900

## 2020-01-15 NOTE — PROGRESS NOTES
No charge note today. Pt seen and examined. Pt with new onset confusion from baseline with h/o previous stroke. Supra pubic cathter in place with possible UTI. MRI and EEG and IV ceftriaxone ordered . Urine culture and blood cultures pending     Marva Chan MD

## 2020-01-15 NOTE — PROGRESS NOTES
RECEIVING UNIT ED HANDOFF REVIEW    ED Nurse Handoff Report was reviewed by: Caroline Doran on January 15, 2020 at 5:27 AM

## 2020-01-15 NOTE — ED NOTES
Bed: ST  Expected date: 1/15/20  Expected time: 3:12 AM  Means of arrival: Ambulance  Comments:  Samantha CLARK 74M AMS

## 2020-01-16 ENCOUNTER — APPOINTMENT (OUTPATIENT)
Dept: SPEECH THERAPY | Facility: CLINIC | Age: 75
DRG: 698 | End: 2020-01-16
Attending: INTERNAL MEDICINE
Payer: COMMERCIAL

## 2020-01-16 LAB
ALBUMIN SERPL-MCNC: 2.2 G/DL (ref 3.4–5)
ALP SERPL-CCNC: 59 U/L (ref 40–150)
ALT SERPL W P-5'-P-CCNC: 10 U/L (ref 0–70)
ANION GAP SERPL CALCULATED.3IONS-SCNC: 2 MMOL/L (ref 3–14)
AST SERPL W P-5'-P-CCNC: 11 U/L (ref 0–45)
BASOPHILS # BLD AUTO: 0 10E9/L (ref 0–0.2)
BASOPHILS NFR BLD AUTO: 0 %
BILIRUB SERPL-MCNC: 0.1 MG/DL (ref 0.2–1.3)
BUN SERPL-MCNC: 14 MG/DL (ref 7–30)
CALCIUM SERPL-MCNC: 8.4 MG/DL (ref 8.5–10.1)
CHLORIDE SERPL-SCNC: 113 MMOL/L (ref 94–109)
CO2 SERPL-SCNC: 26 MMOL/L (ref 20–32)
CREAT SERPL-MCNC: 0.65 MG/DL (ref 0.66–1.25)
DIFFERENTIAL METHOD BLD: ABNORMAL
EOSINOPHIL # BLD AUTO: 0.1 10E9/L (ref 0–0.7)
EOSINOPHIL NFR BLD AUTO: 2.6 %
ERYTHROCYTE [DISTWIDTH] IN BLOOD BY AUTOMATED COUNT: 14.8 % (ref 10–15)
GFR SERPL CREATININE-BSD FRML MDRD: >90 ML/MIN/{1.73_M2}
GLUCOSE SERPL-MCNC: 90 MG/DL (ref 70–99)
HCT VFR BLD AUTO: 34.8 % (ref 40–53)
HGB BLD-MCNC: 11.2 G/DL (ref 13.3–17.7)
IMM GRANULOCYTES # BLD: 0 10E9/L (ref 0–0.4)
IMM GRANULOCYTES NFR BLD: 0.3 %
LYMPHOCYTES # BLD AUTO: 1.7 10E9/L (ref 0.8–5.3)
LYMPHOCYTES NFR BLD AUTO: 43.1 %
MCH RBC QN AUTO: 32 PG (ref 26.5–33)
MCHC RBC AUTO-ENTMCNC: 32.2 G/DL (ref 31.5–36.5)
MCV RBC AUTO: 99 FL (ref 78–100)
MONOCYTES # BLD AUTO: 0.5 10E9/L (ref 0–1.3)
MONOCYTES NFR BLD AUTO: 11.7 %
NEUTROPHILS # BLD AUTO: 1.6 10E9/L (ref 1.6–8.3)
NEUTROPHILS NFR BLD AUTO: 42.3 %
NRBC # BLD AUTO: 0 10*3/UL
NRBC BLD AUTO-RTO: 1 /100
PLATELET # BLD AUTO: 174 10E9/L (ref 150–450)
POTASSIUM SERPL-SCNC: 4 MMOL/L (ref 3.4–5.3)
PROT SERPL-MCNC: 6.6 G/DL (ref 6.8–8.8)
RBC # BLD AUTO: 3.5 10E12/L (ref 4.4–5.9)
SODIUM SERPL-SCNC: 141 MMOL/L (ref 133–144)
VALPROATE SERPL-MCNC: 49 MG/L (ref 50–100)
WBC # BLD AUTO: 3.9 10E9/L (ref 4–11)

## 2020-01-16 PROCEDURE — 85025 COMPLETE CBC W/AUTO DIFF WBC: CPT | Performed by: HOSPITALIST

## 2020-01-16 PROCEDURE — 80053 COMPREHEN METABOLIC PANEL: CPT | Performed by: HOSPITALIST

## 2020-01-16 PROCEDURE — 99232 SBSQ HOSP IP/OBS MODERATE 35: CPT | Performed by: INTERNAL MEDICINE

## 2020-01-16 PROCEDURE — 92610 EVALUATE SWALLOWING FUNCTION: CPT | Mod: GN | Performed by: SPEECH-LANGUAGE PATHOLOGIST

## 2020-01-16 PROCEDURE — 25000132 ZZH RX MED GY IP 250 OP 250 PS 637: Performed by: INTERNAL MEDICINE

## 2020-01-16 PROCEDURE — 12000000 ZZH R&B MED SURG/OB

## 2020-01-16 PROCEDURE — 25000128 H RX IP 250 OP 636: Performed by: HOSPITALIST

## 2020-01-16 PROCEDURE — 80164 ASSAY DIPROPYLACETIC ACD TOT: CPT | Performed by: HOSPITALIST

## 2020-01-16 PROCEDURE — 36415 COLL VENOUS BLD VENIPUNCTURE: CPT | Performed by: HOSPITALIST

## 2020-01-16 PROCEDURE — 92526 ORAL FUNCTION THERAPY: CPT | Mod: GN | Performed by: SPEECH-LANGUAGE PATHOLOGIST

## 2020-01-16 RX ADMIN — SENNOSIDES AND DOCUSATE SODIUM 1 TABLET: 8.6; 5 TABLET ORAL at 21:12

## 2020-01-16 RX ADMIN — MULTIPLE VITAMINS W/ MINERALS TAB 1 TABLET: TAB at 10:45

## 2020-01-16 RX ADMIN — APIXABAN 2.5 MG: 2.5 TABLET, FILM COATED ORAL at 21:12

## 2020-01-16 RX ADMIN — ZONISAMIDE 300 MG: 100 CAPSULE ORAL at 10:44

## 2020-01-16 RX ADMIN — PERAMPANEL 4 MG: 4 TABLET ORAL at 21:12

## 2020-01-16 RX ADMIN — APIXABAN 2.5 MG: 2.5 TABLET, FILM COATED ORAL at 10:45

## 2020-01-16 RX ADMIN — SIMVASTATIN 10 MG: 10 TABLET, FILM COATED ORAL at 21:12

## 2020-01-16 RX ADMIN — VALPROIC ACID 1000 MG: 250 CAPSULE, LIQUID FILLED ORAL at 21:11

## 2020-01-16 RX ADMIN — VALPROIC ACID 500 MG: 250 CAPSULE, LIQUID FILLED ORAL at 10:44

## 2020-01-16 RX ADMIN — SENNOSIDES AND DOCUSATE SODIUM 1 TABLET: 8.6; 5 TABLET ORAL at 10:45

## 2020-01-16 RX ADMIN — CEFTRIAXONE 2 G: 2 INJECTION, POWDER, FOR SOLUTION INTRAMUSCULAR; INTRAVENOUS at 06:00

## 2020-01-16 RX ADMIN — FINASTERIDE 5 MG: 5 TABLET, FILM COATED ORAL at 10:44

## 2020-01-16 ASSESSMENT — ACTIVITIES OF DAILY LIVING (ADL)
ADLS_ACUITY_SCORE: 31

## 2020-01-16 NOTE — CONSULTS
"[facility]  Urology Consult Note  Name: Josiah Toney    MRN: [id number]  YOB: 1945    Age: 74 year old  Date of admission: 1/15/2020  Primary care provider: Shania Wyatt     Requesting Physician:  Dr. Chan   Reason for consult:  UTI, ?SPT change           History of Present Illness:   Josiah Toney is a 74 year old male, seen at the request of Dr. Chan, who was admitted from care facility with altered mental status.  PMHx of prior stroke, cerebral palsy, epilepsy, chronic left sided spastic hemiparesis, DVT, depression, and dyslipidemia.  He has followed with [facility] urology for some time for voiding dysfunction, overflow incontinence, urinary retention and recurrent UTIs.  He underwent cystolitholopaxy and suprapubic tube placement in January 2017.  It appears he last saw their group Monday 1/13/20, but unable to view a clinic note from the appointment.  Per patient's report, his SPT is changed every 2 weeks by his care facility.  He believes it was changed last week.  Uses a 14Fr catheter with 5cc balloon.  CT scan did not reveal any stones.  UA with 78 WBC, 5 RBC, few bacteria.  Urine culture grew >100k cfu/ml gram negative rods.  He received first dose of antibiotics (rocephin) at 0600 today.       From admission H&P 1/15/2020: Josiah Toney is a very pleasant 74 year old gentleman who presents with confusion noticed earlier in the day by staff at his facility; constant since onset and progressively worse; characterized as inability to carry on a conversation. Normally he quite able to do so. He tells me he has pain when he tries to void through his catheter. He says he is worried about \"boxes\" being lost. He denies any other acute complaints at this time. History limited by confusion.     In the ED, Blood pressure 115/73, temperature 97.1  F (36.2  C), temperature source Temporal, resp. rate 20, weight 77.5 kg (170 lb 13.7 oz), SpO2 95 %.     CBC showed WBC 4, HGB " 12 (11 on 10/2019), . CMP notable for Cl 110, Ca 8.3, Alb 2.4, otherwise in normal reference range. UA showed 78 WBC, 5 RBC, large LE, negative nitrites, and positive for triple phosphates. CT Head without contrast showed only a chronic moderate sized right MCA infarct. He was given Ceftriaxone.          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)      Cerebral palsy (H)      Depression      DVT (deep venous thrombosis) (H)      Dyslipidemia      Hypertension      Nephrolithiasis      PFO (patent foramen ovale)      Unspecified cerebral artery occlusion with cerebral infarction 10/10     Urinary retention              Past Surgical History:     Past Surgical History:   Procedure Laterality Date     ------------OTHER-------------      Cystolithopaxy and suprapubic cystotomy with catheter placement     LITHOTRIPSY  2001               Social History:     Social History     Tobacco Use     Smoking status: Never Smoker     Smokeless tobacco: Never Used   Substance Use Topics     Alcohol use: Not Currently             Family History:   No family history on file.          Allergies:   No Known Allergies          Medications:       apixaban ANTICOAGULANT  2.5 mg Oral BID     cefTRIAXone  2 g Intravenous Q24H     finasteride  5 mg Oral Daily     multivitamin w/minerals  1 tablet Oral Daily     perampanel  4 mg Oral At Bedtime     senna-docusate  1 tablet Oral BID     simvastatin  10 mg Oral At Bedtime     sodium chloride (PF)  3 mL Intracatheter Q8H     valproic acid  1,000 mg Oral At Bedtime     valproic acid  500 mg Oral QAM     zonisamide  300 mg Oral QAM             Review of Systems:   A comprehensive greater than 10 system review of systems was carried out.  Pertinent positives and negatives are noted above.  Otherwise negative for contributory info.            Physical Exam:     Blood pressure 125/44, pulse 74, temperature 97.6  F (36.4  C), temperature  source Oral, resp. rate 16, weight 72.3 kg (159 lb 4.8 oz), SpO2 93 %.    Intake/Output Summary (Last 24 hours) at 1/16/2020 1227  Last data filed at 1/16/2020 1010  Gross per 24 hour   Intake 240 ml   Output 1000 ml   Net -760 ml     Exam:  General - Awake, resting comfortably in bed, appears stated age  Pulm - Normal respiratory effort  CVS - No peripheral edema  Abd - Soft, non-tender, non-distended.  No guarding, rigidity or peritoneal signs.    - 14Fr (5 ml balloon) SPT in place draining smith urine, some sediment  Neuro - CN II-XII grossly intact, some slurring of speech  Musculoskeletal - no clubbing, cyanosis, or edema; LUE contractures  Psych - responsive, alert, cooperative  Skin - no obvious rashes         Data Reviewed:     Recent Results (from the past 24 hour(s))   CT Abdomen Pelvis w/o Contrast    Narrative    CT ABDOMEN AND PELVIS WITHOUT CONTRAST 1/15/2020 6:19 PM     HISTORY: Flank pain, recurrent stone disease suspected. Hematuria,  unknown cause. Urinary tract stone, known, symptomatic, complications  or risk factors.    COMPARISON: None.    TECHNIQUE: Axial CT images of the abdomen and pelvis from the  diaphragm to the symphysis pubis were acquired without IV contrast.  Radiation dose for this scan was reduced using automated exposure  control, adjustment of the mA and/or kV according to patient size, or  iterative reconstruction technique.    FINDINGS: There are no stones seen within either kidney, either  ureter, or the bladder. There is no hydroureter or hydronephrosis.  There is no perinephric fat stranding. Kidneys are normal in size and  configuration. Suprapubic catheter noted. No free air or free fluid.  Soft tissue thickening over the left ischial tuberosity noted, some  associated increased sclerosis is noted. Question developing ulcer  and/or osteomyelitis. Liver cyst noted. Gallbladder unremarkable.  There are no dilated loops of small intestine or large bowel to  suggest ileus or  obstruction. No free air or free fluid. Unremarkable  appendix. No diverticulitis.  No splenomegaly.  No definite adrenal  nodules.  Pancreas grossly unremarkable. The remainder of the  visualized abdomen is unremarkable on this noncontrast scan. Survey of  the visualized bony structures demonstrates no destructive bony  lesions. The visualized lung bases demonstrate trace probable  atelectasis.       Impression    IMPRESSION:   1. No renal, ureteral, or bladder stones.  2. Soft tissue thickening over the left ischial tuberosity with some  associated increased sclerosis, question a developing decubitus ulcer  and/or osteomyelitis.         BARRETT HERNANDEZ MD       Recent Labs   Lab 01/16/20  0801 01/15/20  0336   WBC 3.9* 4.7   HGB 11.2* 12.1*   HCT 34.8* 37.8*   MCV 99 100    165     Recent Labs   Lab 01/16/20  0801 01/15/20  0336    141   POTASSIUM 4.0 3.9   CHLORIDE 113* 110*   CO2 26 25   ANIONGAP 2* 6   GLC 90 93   BUN 14 19   CR 0.65* 0.71   GFRESTIMATED >90 >90   GFRESTBLACK >90 >90   BREANN 8.4* 8.3*     No results for input(s): LACT in the last 168 hours.  Recent Labs   Lab 01/15/20  0337   COLOR Yellow   APPEARANCE Cloudy   URINEGLC Negative   URINEBILI Negative   URINEKETONE Negative   SG 1.019   UBLD Trace*   URINEPH 7.5*   PROTEIN 10*   NITRITE Negative   LEUKEST Large*   RBCU 5*   WBCU 78*         Assessment and Plan:   Josiah Toney is a 74 year old male who presented with altered mental status.  Has followed with [facility] [place] urology for some time for voiding dysfunction, overflow incontinence, urinary retention and recurrent UTIs.  S/p cystolitholopaxy and suprapubic tube placement in January 2017.  Per patient's report, his SPT is changed every 2 weeks by his care facility and believes it was changed last week.  Uses a 14Fr catheter with 5cc balloon.  UA with 78 WBC, 5 RBC, few bacteria.  Urine culture grew >100k cfu/ml gram negative rods.      Plan:  1. Follow urine culture  results  2. Antibiotics per hospitalist team  3. Agree that SPT needs to be changed.  Will plan to do this tomorrow once he has been on antibiotics at least 24 hours (first abx appear to be rocephin at 0600 today).  4. This plan has been discussed with Dr. Riya Feng PA-C  [facility]  Phone: [phone]

## 2020-01-16 NOTE — PLAN OF CARE
Patient alert to self and place, baseline deficits from cerebal palsy left droop, left arm contracted but able to lift, 4/5 strength bilateral UE, 2-3/5 strength bilateral lower extremity. Patient unable to do MRI because cant lay flat, Dr Chan aware. Patient has s/p cath and it will be changed out by urology tomorrow once on iv abx for 24 hrs. Discharge pending.

## 2020-01-16 NOTE — PROGRESS NOTES
CM    I: SW consult acknowledged. ROSALIA met with Brett, liaison for [facility], who stated patient is a resident in their LTC; currently on an 18 day bed hold. Brett is not aware if pt has a guardian, but will check and let SW know.    P: Continue to assist as needed.    JORDON Hadley   [facility]  [phone]

## 2020-01-16 NOTE — PROCEDURES
Procedure Date: 01/15/2020      ELECTROENCEPHALOGRAM       ORDERING PROVIDER:  Dr. Solorzano       EEG # [id number]      This EEG is recorded in an awake and alert individual.  Background activity is represented by presence of alpha rhythm.  There is presence of polyspikes, which are present in bilateral frontal areas.  Sometimes they appear to be generalized.  There is also motion artifact associated with the recording.      Hyperventilation and photic stimulations are not performed.      IMPRESSION:  This is an abnormal electroencephalogram consistent with epileptiform process.  This patient has a history of myoclonic epilepsy.  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  No active generalized seizures are seen.  Clinical correlation is advised.            NITA PALM MD             D: 2020   T: 2020   MT: ORTEGA      Name:     HEATHER HAQ   MRN:      [id number]        Account:        [id number]   :      1945           Procedure Date: 01/15/2020      Document: [id number]

## 2020-01-16 NOTE — CONSULTS
CLINICAL NUTRITION SERVICES  -  ASSESSMENT NOTE      Malnutrition:   % Weight Loss:  Weight loss does not meet criteria for malnutrition. Question the accuracy of 1/15 weights.   % Intake:  <75% for >/= 1 month (non-severe malnutrition)   Subcutaneous Fat Loss:  Upper arm region moderate depletion  Muscle Loss:  Temporal region moderate depletion, Clavicle bone region severe depletion and Dorsal hand region moderate depletion  Fluid Retention:  None noted    Malnutrition Diagnosis: Non-Severe malnutrition  In Context of:  Acute illness or injury          REASON FOR ASSESSMENT  Josaih Toney is a 74 year old male seen by Registered Dietitian for Admission Nutrition Risk Screen for reduced oral intake over the last month      NUTRITION HISTORY  - Information obtained from patient.   - Eats 3 meals a day in the dining center at his [facility]  - Likes eggs; at this time he is not eating as many as he usually does   - Like OJ and apple juice      CURRENT NUTRITION ORDERS  Diet Order:     Regular       Current Intake/Tolerance:  -Today's intake of omelet, ice cream, pancakes  - Pt was unable to provide a lot of insight as to his intake and tolerance   - SLP consult for swallowing evaluation ordered for this weekend; diet changed to DD2    NUTRITION FOCUSED PHYSICAL ASSESSMENT FOR DIAGNOSING MALNUTRITION)  Yes              Observed:    Muscle wasting (refer to documentation in Malnutrition section) and Subcutaneous fat loss (refer to documentation in Malnutrition section)    Obtained from Chart/Interdisciplinary Team:    ANTHROPOMETRICS  Height: 6'  Weight: 159 lbs 4.8 oz; 72 kg  Body mass index is 24.29 kg/m .  Weight Status:  Normal BMI  IBW: 178 lb; 80 kg  % IBW: 90%  Weight History: Pt is unsure about any weight changes.   Wt Readings from Last 10 Encounters:   01/15/20 72.3 kg (159 lb 4.8 oz)   05/12/17 77.1 kg (170 lb)   09/03/15 84.3 kg (185 lb 12.8 oz)   05/29/15 81.6 kg (179 lb 12.8 oz)   05/20/13 86.2 kg (190 lb)        LABS  Labs reviewed    MEDICATIONS  Medications reviewed      ASSESSED NUTRITION NEEDS PER APPROVED PRACTICE GUIDELINES:    Dosing Weight: 72 kg   Estimated Energy Needs: 6268-2901 kcals (25-30 Kcal/Kg)  Justification: maintenance  Estimated Protein Needs: 86.4-108 grams protein (1.2-1.5 g pro/Kg)  Justification: maintenance  Estimated Fluid Needs: 9054-9480 mL (1 mL/Kcal)  Justification: maintenance    MALNUTRITION:  % Weight Loss:  Weight loss does not meet criteria for malnutrition. Question the accuracy of 1/15 weights.   % Intake:  <75% for >/= 1 month (non-severe malnutrition)   Subcutaneous Fat Loss:  Upper arm region moderate depletion  Muscle Loss:  Temporal region moderate depletion, Clavicle bone region severe depletion and Dorsal hand region moderate depletion  Fluid Retention:  None noted    Malnutrition Diagnosis: Non-severe malnutrition  In Context of:  Acute illness or injury    NUTRITION DIAGNOSIS:  Inadequate oral intake related to decreased appetite as evidenced by diet recall noting less than usual portion sizes.       NUTRITION INTERVENTIONS  Recommendations / Nutrition Prescription   DD2,thin liquids  Nutrition Supplements- Boost Plus 2x day      Implementation  Nutrition education: Per Provider order if indicated       Nutrition Goals  Pt will consume at least 75% of nutrition supplements.   Pt will consume at least 75% of meals.       MONITORING AND EVALUATION:  Progress towards goals will be monitored and evaluated per protocol and Practice Guidelines

## 2020-01-16 NOTE — PLAN OF CARE
Discharge Planner SLP   Patient plan for discharge: Did not state  Current status: SLP: Pt presents with mild dyphagia on clinical swallow evaluation. Pt reported chronic globus sensation with solids and concern for low/soft voice. Oral motor exam significant for left sided weakness/droop. Thin liquids by straw trialed with functional oral phase and no overt s/sx of aspiration. No difficulty with puree textures. Prolonged mastication, left sided residue and consistent throat clearing on cracker. Discussed SLP scope of practice and risk of aspiration associated with CP. Pt in agreement to further assess with Video Swallow Study and esophageal follow though with timing pending discharge plans.    Pt verbalized agreement with downgrade to dysphagia diet level 2 and thin liquids (straws ok). Upright as possible for intake, small bites and sips, slow rate.     Barriers to return to prior living situation: None  Recommendations for discharge: Return to LTC with SLP follow up  Rationale for recommendations: SLP to follow for diet tolerance, strategy training, consider ENT for voice concerns. Pending Video Swallow Study results       Entered by: Radha Booker 01/16/2020 3:37 PM

## 2020-01-16 NOTE — PROGRESS NOTES
"   01/16/20 1526   General Information   Onset Date 01/15/20   Start of Care Date 01/16/20   Referring Physician Dr. Chan   Patient Profile Review/OT: Additional Occupational Profile Info See Profile for full history and prior level of function   Patient/Family Goals Statement did not state   Swallowing Evaluation Bedside swallow evaluation   Behaviorial Observations WFL (within functional limits)   Mode of current nutrition Oral diet   Type of oral diet Regular;Thin liquid   Respiratory Status Room air   Comments \"Josiah Toney is a very pleasant 74 year old gentleman with past history that is most notable for prior stroke, cerebral palsy, [place] Myoclonic Epilepsy, chronic left sided spastic hemiparesis, chronic urinary retention status post supra-pubic catheter placement, DVT, chronic depression, and dyslipidemia who presents with confusion and is found to have acute metabolic encephalopathy.\" No documented hx of SLP services, however, ?G tube in 2010 due to dysphagia?.    Clinical Swallow Evaluation   Oral Musculature anomalies present   Dentition present and adequate   Secretion Management   (RN reported drool; no obvious anterior spillage)   Mucosal Quality good   Mandibular Strength and Mobility intact   Oral Labial Strength and Mobility impaired seal   Lingual Strength and Mobility impaired left lateral movement   Velar Elevation intact   Laryngeal Function Throat clear;Swallow;Voicing initiated   Oral Musculature Comments Left side    Additional Documentation Yes   Clinical Swallow Eval: Thin Liquid Texture Trial   Mode of Presentation, Thin Liquids straw;self-fed   Oral Phase of Swallow WFL   Pharyngeal Phase of Swallow intact   Diagnostic Statement WNL   Clinical Swallow Eval: Puree Solid Texture Trial   Mode of Presentation, Puree spoon;self-fed   Oral Phase, Puree WFL   Pharyngeal Phase, Puree intact   Diagnostic Statement self fed large bites; WNL   Clinical Swallow Eval: Solid Food Texture Trial "   Mode of Presentation, Solid self-fed   Oral Phase, Solid Poor AP movement;Residue in oral cavity   Oral Residue, Solid left anterior lateral sulci   Pharyngeal Phase, Solid impaired;feeling of something stuck in throat;throat clearing   Diagnostic Statement s/sx of aspiration   Swallow Compensations   Swallow Compensations Alternate viscosity of consistencies   Results   (pt reported effective)   Esophageal Phase of Swallow   Patient reports or presents with symptoms of esophageal dysphagia No   General Therapy Interventions   Planned Therapy Interventions Dysphagia Treatment   Dysphagia treatment Modified diet education;Instruction of safe swallow strategies   Swallow Eval: Clinical Impressions   Skilled Criteria for Therapy Intervention Skilled criteria met.  Treatment indicated.   Functional Assessment Scale (FAS) 5   Treatment Diagnosis mild dysphagia   Diet texture recommendations Dysphagia diet level 2;Thin liquids   Recommended Feeding/Eating Techniques alternate between small bites and sips of food/liquid;check mouth frequently for oral residue/pocketing;small sips/bites   Demonstrates Need for Referral to Another Service dietitian   Therapy Frequency 5x/week   Predicted Duration of Therapy Intervention (days/wks) 1 week   Anticipated Discharge Disposition extended care facility   Risks and Benefits of Treatment have been explained. Yes   Patient, family and/or staff in agreement with Plan of Care Yes   Clinical Impression Comments SLP: Pt presents with mild dyphagia on clinical swallow evaluation. Pt reported chronic globus sensation with solids and concern for low/soft voice. Oral motor exam significant for left sided weakness/droop. Thin liquids by straw trialed with functional oral phase and no overt s/sx of aspiration. No difficulty with puree textures. Prolonged mastication, left sided residue and consistent throat clearing on cracker. Discussed SLP scope of practice and risk of aspiration associated  with CP. Pt in agreement to further assess with Video Swallow Study and esophageal follow though. Pt verbalized agreement with downgrade to dysphagia diet level 2 and thin liquids (straws ok). Upright as possible for intake, small bites and sips, slow rate.    Total Evaluation Time   Total Evaluation Time (Minutes) 15

## 2020-01-16 NOTE — PROGRESS NOTES
[facility]    Hospitalist Progress Note    Date of Service (when I saw the patient): 01/16/2020    Assessment & Plan   Josiah Toney is a 74 year old male who was admitted on 1/15/2020.  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     PLAN  1)  Acute metabolic encephalopathy: Possibly due to CAUTI or stroke leading to expressive aphasia.  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 His Urology care is through [facility]. He has had prior stroke and some prior notes document chronic cognitive deficits. He has cerebral palsy and chronic voiding dysfunction, overflow incontinence, urinary retention, and recurrent UTI's;   on 1/2017 he underwent cystolitholapaxy of bladder material and suprapubic cystotomy with suprapubic tube placement. Most recent Urine Culture from 8/2019 showed Proteus mirabilis resistant to quinolones and Bactrim. He has had recurrent nephrolithiasis requiring prior lithotripsy. Most recent Renal US 9/2019 showed only punctate left sided non-obstructive stones. He last saw his Urologist earlier this month.  Now he presents with one day of progressive encephalopathy characterized by confusion and word finding difficulties. UA from his supra-pubic catheter shows pyuria and triple phosphate crystals. There are no other signs of SIRS. He could have CAUTI. He could have acute stroke or other metabolic cause of confusion.    -- Inpatient. Fall precautions. Q 4 neuro checks. Empiric Ceftriaxone continued. Follow up cultures.     Seen by neurology EEG is done and results are pending  MRI was unable to be  obtained due to his history of stroke and chronic contractions he is unable to lay flat in the MRI  CT head without contrast on admission showed a moderate sized chronic right MCA infarct no acute intracranial process  Mental status is a little better today he is able to answer questions appropriately which is much improved compared to yesterday    2.  Complicated UTI in the setting of suprapubic catheter chronic;    Urine culture grew greater than 100k colonies of gram-negative rods will request urology consult to see if patient needs to suprapubic catheter exchange during this hospitalization with a UTI  A CT scan of the abdomen pelvis without contrast was done to look for any nephrolithiasis and it returned negative    2) DVT: In the past he has been on systemic anticoagulation.  Restarted Eliquis  3 history of seizure disorder. ;  Continue prior to admission antiepileptics regimen     3) Chronic anemia: Monitor while hospitalized    DVT Prophylaxis: elliquis   Code Status: Full Code    Disposition: Expected discharge in 2-3days with improveemnt in menatl status and UTI treatment     Marva Chan MD  [phone] (P)      Interval History   Resting comfortably in bed.  More alert and awake and answering questions appropriately today.  Unable to do MRI due to chronic contractions from cerebral palsy and previous stroke    -Data reviewed today: I reviewed all new labs and imaging results over the last 24 hours. I personally reviewed no images or EKG's today.    Physical Exam   Temp: 97.9  F (36.6  C) Temp src: Oral BP: 119/53 Pulse: 74 Heart Rate: 71 Resp: 16 SpO2: 95 % O2 Device: None (Room air)    Vitals:    01/15/20 0329 01/15/20 1453   Weight: 77.5 kg (170 lb 13.7 oz) 72.3 kg (159 lb 4.8 oz)     Vital Signs with Ranges  Temp:  [97.9  F (36.6  C)-98.4  F (36.9  C)] 97.9  F (36.6  C)  Pulse:  [] 74  Heart Rate:  [63-74] 71  Resp:  [16] 16  BP: ()/(42-84) 119/53  SpO2:  [95 %-99 %] 95 %  I/O last 3  completed shifts:  In: -   Out: 1000 [Urine:1000]    Constitutional: Awake, alert, cooperative, no apparent distress, frail looking   Respiratory: Clear to auscultation bilaterally, no crackles or wheezing  Cardiovascular: Regular rate and rhythm, normal S1 and S2, and no murmur noted  GI: Normal bowel sounds, soft, non-distended, non-tender  Skin/Integumen: No rashes, no cyanosis, no edema  Other: left sided contractions in LUE    Medications       apixaban ANTICOAGULANT  2.5 mg Oral BID     cefTRIAXone  2 g Intravenous Q24H     finasteride  5 mg Oral Daily     multivitamin w/minerals  1 tablet Oral Daily     perampanel  4 mg Oral At Bedtime     senna-docusate  1 tablet Oral BID     simvastatin  10 mg Oral At Bedtime     sodium chloride (PF)  3 mL Intracatheter Q8H     valproic acid  1,000 mg Oral At Bedtime     valproic acid  500 mg Oral QAM     zonisamide  300 mg Oral QAM       Data   Recent Labs   Lab 01/16/20  0801 01/15/20  0336   WBC 3.9* 4.7   HGB 11.2* 12.1*   MCV 99 100    165    141   POTASSIUM 4.0 3.9   CHLORIDE 113* 110*   CO2 26 25   BUN 14 19   CR 0.65* 0.71   ANIONGAP 2* 6   BREANN 8.4* 8.3*   GLC 90 93   ALBUMIN 2.2* 2.4*   PROTTOTAL 6.6* 7.2   BILITOTAL 0.1* 0.2   ALKPHOS 59 65   ALT 10 11   AST 11 14       Recent Results (from the past 24 hour(s))   CT Abdomen Pelvis w/o Contrast    Narrative    CT ABDOMEN AND PELVIS WITHOUT CONTRAST 1/15/2020 6:19 PM     HISTORY: Flank pain, recurrent stone disease suspected. Hematuria,  unknown cause. Urinary tract stone, known, symptomatic, complications  or risk factors.    COMPARISON: None.    TECHNIQUE: Axial CT images of the abdomen and pelvis from the  diaphragm to the symphysis pubis were acquired without IV contrast.  Radiation dose for this scan was reduced using automated exposure  control, adjustment of the mA and/or kV according to patient size, or  iterative reconstruction technique.    FINDINGS: There are no stones seen within either  kidney, either  ureter, or the bladder. There is no hydroureter or hydronephrosis.  There is no perinephric fat stranding. Kidneys are normal in size and  configuration. Suprapubic catheter noted. No free air or free fluid.  Soft tissue thickening over the left ischial tuberosity noted, some  associated increased sclerosis is noted. Question developing ulcer  and/or osteomyelitis. Liver cyst noted. Gallbladder unremarkable.  There are no dilated loops of small intestine or large bowel to  suggest ileus or obstruction. No free air or free fluid. Unremarkable  appendix. No diverticulitis.  No splenomegaly.  No definite adrenal  nodules.  Pancreas grossly unremarkable. The remainder of the  visualized abdomen is unremarkable on this noncontrast scan. Survey of  the visualized bony structures demonstrates no destructive bony  lesions. The visualized lung bases demonstrate trace probable  atelectasis.       Impression    IMPRESSION:   1. No renal, ureteral, or bladder stones.  2. Soft tissue thickening over the left ischial tuberosity with some  associated increased sclerosis, question a developing decubitus ulcer  and/or osteomyelitis.         BARRETT HERNANDEZ MD

## 2020-01-16 NOTE — PLAN OF CARE
Pt here with AMS. Disoriented to time and situation, inconsistent with following commands. Neuros: L arm is slightly contracted, BLE 1/5. VSS. Regular diet, thin liquids. Takes pills whole with water. Up with A2 and lift. Denies pain. Suprapubic catheter patency maintained. Incontinent of bowel. Turn and reposition Q2H. Pt scoring yellow on the Aggression Stop Light Tool due to confusion. Discharge plan pending, nursing will continue to monitor.

## 2020-01-16 NOTE — PROGRESS NOTES
[facility]  [facility] [place]  Neurology Daily Note     10/6/11 2:09 PM        Assessment and Plan:     Catheter-associated urinary tract infection (H)    Encephalopathy - due to UTI.    No evidence of active SZ or stroke. Depakote level is 49.    Recommendation  - continue the same AED doses. No additional need for neurological work up.        Interval History:   Pt. Is improving.  EEG is reviewed - no active SZ. Finding are c/w Myoclonic epilepsy.           Review of Systems:   No new issues       Medications:          apixaban ANTICOAGULANT  2.5 mg Oral BID     cefTRIAXone  2 g Intravenous Q24H     finasteride  5 mg Oral Daily     multivitamin w/minerals  1 tablet Oral Daily     perampanel  4 mg Oral At Bedtime     senna-docusate  1 tablet Oral BID     simvastatin  10 mg Oral At Bedtime     sodium chloride (PF)  3 mL Intracatheter Q8H     valproic acid  1,000 mg Oral At Bedtime     valproic acid  500 mg Oral QAM     zonisamide  300 mg Oral QAM          Physical Exam:   Vitals: Blood pressure 125/44, pulse 74, temperature 97.6  F (36.4  C), temperature source Oral, resp. rate 16, weight 72.3 kg (159 lb 4.8 oz), SpO2 93 %.  General Appearance:  Awake, alert, cooperative            Data:     Results for orders placed or performed during the hospital encounter of 01/15/20 (from the past 24 hour(s))   CT Abdomen Pelvis w/o Contrast    Narrative    CT ABDOMEN AND PELVIS WITHOUT CONTRAST 1/15/2020 6:19 PM     HISTORY: Flank pain, recurrent stone disease suspected. Hematuria,  unknown cause. Urinary tract stone, known, symptomatic, complications  or risk factors.    COMPARISON: None.    TECHNIQUE: Axial CT images of the abdomen and pelvis from the  diaphragm to the symphysis pubis were acquired without IV contrast.  Radiation dose for this scan was reduced using automated exposure  control, adjustment of the mA and/or kV according to patient size, or  iterative reconstruction  technique.    FINDINGS: There are no stones seen within either kidney, either  ureter, or the bladder. There is no hydroureter or hydronephrosis.  There is no perinephric fat stranding. Kidneys are normal in size and  configuration. Suprapubic catheter noted. No free air or free fluid.  Soft tissue thickening over the left ischial tuberosity noted, some  associated increased sclerosis is noted. Question developing ulcer  and/or osteomyelitis. Liver cyst noted. Gallbladder unremarkable.  There are no dilated loops of small intestine or large bowel to  suggest ileus or obstruction. No free air or free fluid. Unremarkable  appendix. No diverticulitis.  No splenomegaly.  No definite adrenal  nodules.  Pancreas grossly unremarkable. The remainder of the  visualized abdomen is unremarkable on this noncontrast scan. Survey of  the visualized bony structures demonstrates no destructive bony  lesions. The visualized lung bases demonstrate trace probable  atelectasis.       Impression    IMPRESSION:   1. No renal, ureteral, or bladder stones.  2. Soft tissue thickening over the left ischial tuberosity with some  associated increased sclerosis, question a developing decubitus ulcer  and/or osteomyelitis.         BARRETT HERNANDEZ MD   Comprehensive metabolic panel   Result Value Ref Range    Sodium 141 133 - 144 mmol/L    Potassium 4.0 3.4 - 5.3 mmol/L    Chloride 113 (H) 94 - 109 mmol/L    Carbon Dioxide 26 20 - 32 mmol/L    Anion Gap 2 (L) 3 - 14 mmol/L    Glucose 90 70 - 99 mg/dL    Urea Nitrogen 14 7 - 30 mg/dL    Creatinine 0.65 (L) 0.66 - 1.25 mg/dL    GFR Estimate >90 >60 mL/min/[1.73_m2]    GFR Estimate If Black >90 >60 mL/min/[1.73_m2]    Calcium 8.4 (L) 8.5 - 10.1 mg/dL    Bilirubin Total 0.1 (L) 0.2 - 1.3 mg/dL    Albumin 2.2 (L) 3.4 - 5.0 g/dL    Protein Total 6.6 (L) 6.8 - 8.8 g/dL    Alkaline Phosphatase 59 40 - 150 U/L    ALT 10 0 - 70 U/L    AST 11 0 - 45 U/L   CBC with platelets differential   Result Value Ref  Range    WBC 3.9 (L) 4.0 - 11.0 10e9/L    RBC Count 3.50 (L) 4.4 - 5.9 10e12/L    Hemoglobin 11.2 (L) 13.3 - 17.7 g/dL    Hematocrit 34.8 (L) 40.0 - 53.0 %    MCV 99 78 - 100 fl    MCH 32.0 26.5 - 33.0 pg    MCHC 32.2 31.5 - 36.5 g/dL    RDW 14.8 10.0 - 15.0 %    Platelet Count 174 150 - 450 10e9/L    Diff Method Automated Method     % Neutrophils 42.3 %    % Lymphocytes 43.1 %    % Monocytes 11.7 %    % Eosinophils 2.6 %    % Basophils 0.0 %    % Immature Granulocytes 0.3 %    Nucleated RBCs 1 (H) 0 /100    Absolute Neutrophil 1.6 1.6 - 8.3 10e9/L    Absolute Lymphocytes 1.7 0.8 - 5.3 10e9/L    Absolute Monocytes 0.5 0.0 - 1.3 10e9/L    Absolute Eosinophils 0.1 0.0 - 0.7 10e9/L    Absolute Basophils 0.0 0.0 - 0.2 10e9/L    Abs Immature Granulocytes 0.0 0 - 0.4 10e9/L    Absolute Nucleated RBC 0.0    Valproic acid   Result Value Ref Range    Valproic Acid Level 49 (L) 50 - 100 mg/L   Urology IP Consult: pt with suprapubic cathter with UTI. needs supra pubic cathter exchanged; Consultant may enter orders: Yes; Patient to be seen: Routine - within 24 hours; Requested Clinic/Group: [facility]; Requesting provide...    Narrative    Caty Feng PA-C     1/16/2020  1:02 PM  [facility]  Urology Consult Note  Name: Josiah Toney    MRN: [id number]  YOB: 1945    Age: 74 year old  Date of admission: 1/15/2020  Primary care provider: Shania Wyatt     Requesting Physician:  Dr. Chan   Reason for consult:  UTI, ?SPT change           History of Present Illness:   Josiah Toney is a 74 year old male, seen at the request of   Dr. Chan, who was admitted from care facility with altered mental   status.  PMHx of prior stroke, cerebral palsy, epilepsy, chronic   left sided spastic hemiparesis, DVT, depression, and   dyslipidemia.  He has followed with [facility] urology for some time   for voiding dysfunction, overflow incontinence, urinary retention   and recurrent  "UTIs.  He underwent cystolitholopaxy and suprapubic   tube placement in January 2017.  It appears he last saw their   group Monday 1/13/20, but unable to view a clinic note from the   appointment.  Per patient's report, his SPT is changed every 2   weeks by his care facility.  He believes it was changed last   week.  Uses a 14Fr catheter with 5cc balloon.  CT scan did not   reveal any stones.  UA with 78 WBC, 5 RBC, few bacteria.  Urine   culture grew >100k cfu/ml gram negative rods.  He received first   dose of antibiotics (rocephin) at 0600 today.       From admission H&P 1/15/2020: Josiah Toney is a very   pleasant 74 year old gentleman who presents with confusion   noticed earlier in the day by staff at his facility; constant   since onset and progressively worse; characterized as inability   to carry on a conversation. Normally he quite able to do so. He   tells me he has pain when he tries to void through his catheter.   He says he is worried about \"boxes\" being lost. He denies any   other acute complaints at this time. History limited by   confusion.     In the ED, Blood pressure 115/73, temperature 97.1  F (36.2  C),   temperature source Temporal, resp. rate 20, weight 77.5 kg (170   lb 13.7 oz), SpO2 95 %.     CBC showed WBC 4, HGB 12 (11 on 10/2019), . CMP notable   for Cl 110, Ca 8.3, Alb 2.4, otherwise in normal reference range.   UA showed 78 WBC, 5 RBC, large LE, negative nitrites, and   positive for triple phosphates. CT Head without contrast showed   only a chronic moderate sized right MCA infarct. He was given   Ceftriaxone.          Past Medical History:     Past Medical History:   Diagnosis Date     [place] myoclonus epilepsy (H)      Cerebral palsy (H)      Depression      DVT (deep venous thrombosis) (H)      Dyslipidemia      Hypertension      Nephrolithiasis      PFO (patent foramen ovale)      Unspecified cerebral artery occlusion with cerebral infarction   10/10     Urinary " retention              Past Surgical History:     Past Surgical History:   Procedure Laterality Date     ------------OTHER-------------      Cystolithopaxy and suprapubic cystotomy with catheter placement     LITHOTRIPSY  2001               Social History:     Social History     Tobacco Use     Smoking status: Never Smoker     Smokeless tobacco: Never Used   Substance Use Topics     Alcohol use: Not Currently             Family History:   No family history on file.          Allergies:   No Known Allergies          Medications:       apixaban ANTICOAGULANT  2.5 mg Oral BID     cefTRIAXone  2 g Intravenous Q24H     finasteride  5 mg Oral Daily     multivitamin w/minerals  1 tablet Oral Daily     perampanel  4 mg Oral At Bedtime     senna-docusate  1 tablet Oral BID     simvastatin  10 mg Oral At Bedtime     sodium chloride (PF)  3 mL Intracatheter Q8H     valproic acid  1,000 mg Oral At Bedtime     valproic acid  500 mg Oral QAM     zonisamide  300 mg Oral QAM             Review of Systems:   A comprehensive greater than 10 system review of systems was   carried out.  Pertinent positives and negatives are noted above.    Otherwise negative for contributory info.            Physical Exam:     Blood pressure 125/44, pulse 74, temperature 97.6  F (36.4  C),   temperature source Oral, resp. rate 16, weight 72.3 kg (159 lb   4.8 oz), SpO2 93 %.    Intake/Output Summary (Last 24 hours) at 1/16/2020 1227  Last data filed at 1/16/2020 1010  Gross per 24 hour   Intake 240 ml   Output 1000 ml   Net -760 ml     Exam:  General - Awake, resting comfortably in bed, appears stated age  Pulm - Normal respiratory effort  CVS - No peripheral edema  Abd - Soft, non-tender, non-distended.  No guarding, rigidity or   peritoneal signs.    - 14Fr (5 ml balloon) SPT in place draining smith urine, some   sediment  Neuro - CN II-XII grossly intact, some slurring of speech  Musculoskeletal - no clubbing, cyanosis, or edema; LUE    contractures  Psych - responsive, alert, cooperative  Skin - no obvious rashes         Data Reviewed:     Recent Results (from the past 24 hour(s))   CT Abdomen Pelvis w/o Contrast    Narrative    CT ABDOMEN AND PELVIS WITHOUT CONTRAST 1/15/2020 6:19 PM     HISTORY: Flank pain, recurrent stone disease suspected.   Hematuria,  unknown cause. Urinary tract stone, known, symptomatic,   complications  or risk factors.    COMPARISON: None.    TECHNIQUE: Axial CT images of the abdomen and pelvis from the  diaphragm to the symphysis pubis were acquired without IV   contrast.  Radiation dose for this scan was reduced using automated exposure  control, adjustment of the mA and/or kV according to patient   size, or  iterative reconstruction technique.    FINDINGS: There are no stones seen within either kidney, either  ureter, or the bladder. There is no hydroureter or   hydronephrosis.  There is no perinephric fat stranding. Kidneys are normal in size   and  configuration. Suprapubic catheter noted. No free air or free   fluid.  Soft tissue thickening over the left ischial tuberosity noted,   some  associated increased sclerosis is noted. Question developing   ulcer  and/or osteomyelitis. Liver cyst noted. Gallbladder unremarkable.  There are no dilated loops of small intestine or large bowel to  suggest ileus or obstruction. No free air or free fluid.   Unremarkable  appendix. No diverticulitis.  No splenomegaly.  No definite   adrenal  nodules.  Pancreas grossly unremarkable. The remainder of the  visualized abdomen is unremarkable on this noncontrast scan.   Survey of  the visualized bony structures demonstrates no destructive bony  lesions. The visualized lung bases demonstrate trace probable  atelectasis.       Impression    IMPRESSION:   1. No renal, ureteral, or bladder stones.  2. Soft tissue thickening over the left ischial tuberosity with   some  associated increased sclerosis, question a developing decubitus    ulcer  and/or osteomyelitis.         BARRETT HERNANDEZ MD       Recent Labs   Lab 01/16/20  0801 01/15/20  0336   WBC 3.9* 4.7   HGB 11.2* 12.1*   HCT 34.8* 37.8*   MCV 99 100    165     Recent Labs   Lab 01/16/20  0801 01/15/20  0336    141   POTASSIUM 4.0 3.9   CHLORIDE 113* 110*   CO2 26 25   ANIONGAP 2* 6   GLC 90 93   BUN 14 19   CR 0.65* 0.71   GFRESTIMATED >90 >90   GFRESTBLACK >90 >90   BREANN 8.4* 8.3*     No results for input(s): LACT in the last 168 hours.  Recent Labs   Lab 01/15/20  0337   COLOR Yellow   APPEARANCE Cloudy   URINEGLC Negative   URINEBILI Negative   URINEKETONE Negative   SG 1.019   UBLD Trace*   URINEPH 7.5*   PROTEIN 10*   NITRITE Negative   LEUKEST Large*   RBCU 5*   WBCU 78*         Assessment and Plan:   Josiah Toney is a 74 year old male who presented with   altered mental status.  [place] [facility] [place]   time for voiding dysfunction, overflow incontinence, urinary   retention and recurrent UTIs.  S/p cystolitholopaxy and   suprapubic tube placement in January 2017.  Per patient's report,   his SPT is changed every 2 weeks by his care facility and   believes it was changed last week.  Uses a 14Fr catheter with 5cc   balloon.  UA with 78 WBC, 5 RBC, few bacteria.  Urine culture   grew >100k cfu/ml gram negative rods.      Plan:  1. Follow urine culture results  2. Antibiotics per hospitalist team  3. Agree that SPT needs to be changed.  Will plan to do this   tomorrow once he has been on antibiotics at least 24 hours (first   abx appear to be rocephin at 0600 today).  4. This plan has been discussed with Dr. Riya Feng, PA-C  [facility]  Phone: [phone]

## 2020-01-16 NOTE — PLAN OF CARE
New admit from the ER d/t possible seizure or UTI. Oriented times two ( Person and place) , disoriented to time and situation. Vitals stable. Denied any pain/discomfort. Neuro stable except confusion, baseline mild left facial droop, left hand and left leg mild contraction. However, he is able to move fully on both upper extremity and slow movement on lower ext. Incontinent of stool and he has existing supra-pubic catheter. Adequate urine out-put. CT head showed no acute issue. Attempted to do MRI per tech and they reported that unable to perform b/c patient was unable lay flat on their table. Plan to attempt again in AM. EEG was done.result is not received yet. Assist of two with a lift with care. Coccyx area is mildly red, but skin intact, he needs help with incontinent care, turn and reposition. Continue to monitor.,

## 2020-01-17 ENCOUNTER — DOCUMENTATION ONLY (OUTPATIENT)
Dept: OTHER | Facility: CLINIC | Age: 75
End: 2020-01-17

## 2020-01-17 ENCOUNTER — APPOINTMENT (OUTPATIENT)
Dept: GENERAL RADIOLOGY | Facility: CLINIC | Age: 75
DRG: 698 | End: 2020-01-17
Attending: INTERNAL MEDICINE
Payer: COMMERCIAL

## 2020-01-17 ENCOUNTER — APPOINTMENT (OUTPATIENT)
Dept: PHYSICAL THERAPY | Facility: CLINIC | Age: 75
DRG: 698 | End: 2020-01-17
Attending: INTERNAL MEDICINE
Payer: COMMERCIAL

## 2020-01-17 ENCOUNTER — APPOINTMENT (OUTPATIENT)
Dept: SPEECH THERAPY | Facility: CLINIC | Age: 75
DRG: 698 | End: 2020-01-17
Attending: INTERNAL MEDICINE
Payer: COMMERCIAL

## 2020-01-17 ENCOUNTER — APPOINTMENT (OUTPATIENT)
Dept: GENERAL RADIOLOGY | Facility: CLINIC | Age: 75
DRG: 698 | End: 2020-01-17
Attending: PHYSICIAN ASSISTANT
Payer: COMMERCIAL

## 2020-01-17 ENCOUNTER — AMBULATORY - HEALTHEAST (OUTPATIENT)
Dept: OTHER | Facility: CLINIC | Age: 75
End: 2020-01-17

## 2020-01-17 PROCEDURE — 74230 X-RAY XM SWLNG FUNCJ C+: CPT

## 2020-01-17 PROCEDURE — 25000128 H RX IP 250 OP 636: Performed by: HOSPITALIST

## 2020-01-17 PROCEDURE — 92611 MOTION FLUOROSCOPY/SWALLOW: CPT | Mod: GN | Performed by: SPEECH-LANGUAGE PATHOLOGIST

## 2020-01-17 PROCEDURE — 71045 X-RAY EXAM CHEST 1 VIEW: CPT

## 2020-01-17 PROCEDURE — 40000893 ZZH STATISTIC PT IP EVAL DEFER

## 2020-01-17 PROCEDURE — 12000000 ZZH R&B MED SURG/OB

## 2020-01-17 PROCEDURE — 25800030 ZZH RX IP 258 OP 636: Performed by: PHYSICIAN ASSISTANT

## 2020-01-17 PROCEDURE — 92526 ORAL FUNCTION THERAPY: CPT | Mod: GN | Performed by: SPEECH-LANGUAGE PATHOLOGIST

## 2020-01-17 PROCEDURE — 40000141 ZZH STATISTIC PERIPHERAL IV START W/O US GUIDANCE

## 2020-01-17 PROCEDURE — 99232 SBSQ HOSP IP/OBS MODERATE 35: CPT | Performed by: PHYSICIAN ASSISTANT

## 2020-01-17 PROCEDURE — 25000132 ZZH RX MED GY IP 250 OP 250 PS 637: Performed by: INTERNAL MEDICINE

## 2020-01-17 PROCEDURE — 40000894 ZZH STATISTIC OT IP EVAL DEFER: Performed by: OCCUPATIONAL THERAPIST

## 2020-01-17 RX ORDER — BARIUM SULFATE 400 MG/ML
SUSPENSION ORAL ONCE
Status: COMPLETED | OUTPATIENT
Start: 2020-01-17 | End: 2020-01-17

## 2020-01-17 RX ORDER — SODIUM CHLORIDE 9 MG/ML
INJECTION, SOLUTION INTRAVENOUS CONTINUOUS
Status: DISCONTINUED | OUTPATIENT
Start: 2020-01-17 | End: 2020-01-18

## 2020-01-17 RX ADMIN — ZONISAMIDE 300 MG: 100 CAPSULE ORAL at 09:48

## 2020-01-17 RX ADMIN — SENNOSIDES AND DOCUSATE SODIUM 1 TABLET: 8.6; 5 TABLET ORAL at 21:11

## 2020-01-17 RX ADMIN — MULTIPLE VITAMINS W/ MINERALS TAB 1 TABLET: TAB at 09:48

## 2020-01-17 RX ADMIN — FINASTERIDE 5 MG: 5 TABLET, FILM COATED ORAL at 09:48

## 2020-01-17 RX ADMIN — PERAMPANEL 4 MG: 4 TABLET ORAL at 21:11

## 2020-01-17 RX ADMIN — SENNOSIDES AND DOCUSATE SODIUM 1 TABLET: 8.6; 5 TABLET ORAL at 09:48

## 2020-01-17 RX ADMIN — BARIUM SULFATE 10 ML: 400 SUSPENSION ORAL at 11:59

## 2020-01-17 RX ADMIN — SIMVASTATIN 10 MG: 10 TABLET, FILM COATED ORAL at 21:15

## 2020-01-17 RX ADMIN — APIXABAN 2.5 MG: 2.5 TABLET, FILM COATED ORAL at 09:48

## 2020-01-17 RX ADMIN — CEFTRIAXONE 2 G: 2 INJECTION, POWDER, FOR SOLUTION INTRAMUSCULAR; INTRAVENOUS at 06:28

## 2020-01-17 RX ADMIN — VALPROIC ACID 1000 MG: 250 CAPSULE, LIQUID FILLED ORAL at 21:11

## 2020-01-17 RX ADMIN — VALPROIC ACID 500 MG: 250 CAPSULE, LIQUID FILLED ORAL at 09:48

## 2020-01-17 RX ADMIN — APIXABAN 2.5 MG: 2.5 TABLET, FILM COATED ORAL at 21:11

## 2020-01-17 RX ADMIN — SODIUM CHLORIDE: 9 INJECTION, SOLUTION INTRAVENOUS at 19:04

## 2020-01-17 ASSESSMENT — ACTIVITIES OF DAILY LIVING (ADL)
ADLS_ACUITY_SCORE: 31
ADLS_ACUITY_SCORE: 33

## 2020-01-17 NOTE — PLAN OF CARE
VSS. Neuros intact ex cerebral palsy baseline and L facial droop baseline. Oriented x4 but forgetful. Tele (check chart). Tolerating DD2 diet, thin liquids. Took pills with water and pudding, usually takes with water. Total care, assist of 2 with mechanical lift. Turn and repo Q 2h. Suprapubic catheter patency maintained, output of 400.  Incontinent of bowel, BM this shift. Scoring green on the aggression stop light tool. Nursing to continue monitoring, discharge pending.

## 2020-01-17 NOTE — PROGRESS NOTES
hospitalist update: called and spoke with brother, Jeff regarding hospital course and recent speech recommendations of NPO with possible feeding tube. Jeff relayed patient up until 1 week ago, was a long term resident at [facility] and recently was forced to relocate due to the facility closing. Brother also notes patient has been losing weight as of lately; indicates he has chronic memory/cognitive impairment. Does not feel patient is far from baseline at this time. Discussed topic of feeding tube; which patient would refuse and brother indicates they would likely accept risk of aspiration and allow pt to eat. At this time, pt will remain NPO, will obtain CXR to rule-out possibility of pneumonia contributing to symptoms. He continues on current dosing of AEDs per neurology, will reassess in AM.    Luís Ceballos PA-C  1/17/2020, 3:14 PM  Pager: [phone]

## 2020-01-17 NOTE — PROGRESS NOTES
UROLOGY PROGRESS NOTE    January 17, 2020    SUBJECTIVE:  Patient resting in bed.  States his brother is coming to visit.  1750/350cc UOP past 24 hours.  Denies any pain.    OBJECTIVE:  Temp:  [97.3  F (36.3  C)-98.2  F (36.8  C)] 97.9  F (36.6  C)  Pulse:  [66] 66  Heart Rate:  [59-71] 61  Resp:  [16] 16  BP: ()/(44-86) 130/47  SpO2:  [93 %-99 %] 96 %    Intake/Output Summary (Last 24 hours) at 1/17/2020 0742  Last data filed at 1/17/2020 0600  Gross per 24 hour   Intake 480 ml   Output 1750 ml   Net -1270 ml       GENERAL:  Awake, no acute distress,   HEAD: Normocephalic atraumatic  SCLERA: Anicteric  EXTREMITIES: Warm and well perfused  : SPT tube changed (old tube 14-Fr latex villalta with 10 ml balloon), see procedure note below    PROCEDURE:  SPT change:  Using sterile method, the area around the SP site was draped and prepped with betadine. A 14-Fr Villalta catheter was copiously lubricated and introduced into the SP site and advanced to the same distance as old SPT. Light straw colored urine returned and the balloon was inflated with 10cc (please note it is a 30cc balloon as there were no 14-Fr latex foleys with 10cc balloons available, but filled only to 10cc). The catheter was gently pulled back where it seated well against the bladder wall and was then secured to the patient's leg with a stat lock.  The patient tolerated the procedure well. Straw colored urine flowing freely at end of procedure.      LABS:  Lab Results   Component Value Date    WBC 3.9 01/16/2020     Lab Results   Component Value Date    HGB 11.2 01/16/2020     Lab Results   Component Value Date    HCT 34.8 01/16/2020     Lab Results   Component Value Date     01/16/2020     Last Basic Metabolic Panel:  Lab Results   Component Value Date     01/16/2020      Lab Results   Component Value Date    POTASSIUM 4.0 01/16/2020     Lab Results   Component Value Date    CHLORIDE 113 01/16/2020     Lab Results   Component Value Date     BREANN 8.4 01/16/2020     Lab Results   Component Value Date    CO2 26 01/16/2020     Lab Results   Component Value Date    BUN 14 01/16/2020     Lab Results   Component Value Date    CR 0.65 01/16/2020     Lab Results   Component Value Date    GLC 90 01/16/2020       ASSESSMENT/PLAN: 73 yo man with SPT (placed 1/2017 by [facility]) and UTI.  Urine culture growing >100K cfu/ml Klebsiella and >100k cfu/ml Enterococcus faecalis.  Sensitivities pending, currently on Rocephin.    1. Follow culture results for sensitivities-- antibiotics per hospitalist  2. SPT changed at bedside by urology today-- per patient, it is apparently changed every 2 weeks at his facility so due for another change in 2 weeks  3. Follow up with his primary urologist at [facility] after discharge  4. Urology will sign off at this time.  Please re-consult or call [phone] if further questions arise.      Caty Feng PA-C  [facility]  Office Phone: [phone]  After 4pm and on weekends, please call [phone]

## 2020-01-17 NOTE — PLAN OF CARE
Pt here with AMS. A&O x4, inconsistent with following commands. Neuro: slightly slurred speech, L facial droop, L arm is slightly contracted, BLE 1/5. VSS. DD2 diet, thin liquids. Takes pills whole with water. Up with A2 and lift. Denies pain. Suprapubic catheter patency maintained. Incontinent of bowel. Turn and reposition Q2H. Pt scoring green on the Aggression Stop Light Tool due to confusion. Discharge plan pending, nursing will continue to monitor.

## 2020-01-17 NOTE — PROGRESS NOTES
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     Acute metabolic encephalopathy, likely infectious  Presented with one day of progressive encephalopathy characterized by confusion and word finding difficulties. UA from his supra-pubic catheter shows pyuria and triple phosphate crystals. CT head without contrast showed a moderate sized chronic right MCA infarct no acute intracranial process. MRI unable to be obtained 2/2 inability to lie flat. Confusion appears to be improving following admission; is able to answer questions and participate in conversation which is an improvement. Does note hx of chronic cognitive deficits related to prior CVA.  - Neurology following, s/p EEG, continued on current dose of AEDs  - Q4h neuro checks  - Continues on Eliquis for further CVA prevention  - Continued PTA statin    Complicated UTI in the setting of chronic indwelling suprapubic catheter  Hx nephrolithiasis  Urology care is through [facility]. Hx of cerebral palsy and chronic voiding dysfunction, overflow incontinence, urinary retention, and recurrent UTI's. Is s/p suprapubic catheter placement in 01/2017 with concurrent cystolitholapaxy of bladder material. Most recent Urine Culture from 8/2019 showed Proteus mirabilis resistant to quinolones and Bactrim. He has had recurrent nephrolithiasis requiring prior lithotripsy. Most recent Renal US 9/2019 showed only punctate left sided non-obstructive stones. He last saw his Urologist earlier this month. 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 Neurology evaluated and is s/p EEG as above.  - Continue PTA Depakene, Perampanel     Hx DVT  Felt to be cause of CVA (+PFO)  - Continues on PTA Eliquis    Hyperlipidemia  - Continues on PTA statin    Speech articulation disorder  Noted in history with intermittent dysarthria. Some concern of dysphagia this admission.  - Speech following, is s/p VSS 1/17    Chronic anemia: Appears chronic and stable. Monitor while hospitalized     Diet: Room Service  Combination Diet Dysphagia Diet Level 2: Mechan Altered; Thin Liquids (water, ice chips, juice, milk gelatin, ice cream, etc) (straws ok)  Snacks/Supplements Adult: Boost Plus; Between Meals    DVT Prophylaxis: Eliquis  Shearer Catheter: in place, indication:    Code Status: Full Code      Disposition Plan   Expected discharge: Tomorrow, recommended to prior living arrangement once antibiotic plan established and safe disposition plan/ TCU bed available.  Entered: Luís Ceballos PA-C 01/17/2020, 1:07 PM       The patient's care was discussed with the Attending Physician, Dr. Vivas, Bedside Nurse and Patient.    Luís Ceballos PA-C  Hospitalist Service  [facility]    ______________________________________________________________________    Interval History   Pt seen & evaluated. Resting in bed, engages in conversation appropriately. Denies pain. Recalls exchange of suprapubic catheter performed this morning. Is hopeful to return home soon. Per RN, had a VSS today, awaiting on speech recommendations.    Data reviewed today: I reviewed all medications, new labs and imaging results over the last 24 hours. I personally reviewed no images or EKG's  today.    Physical Exam   Vital Signs: Temp: 97.9  F (36.6  C) Temp src: Oral BP: 130/47 Pulse: 66 Heart Rate: 61 Resp: 16 SpO2: 96 % O2 Device: None (Room air)    Weight: 159 lbs 4.8 oz  GEN: ill developed adult male, appears comfortable  HEENT: NCAT, PERRL, EOM intact bilaterally, sclera clear, conjunctiva normal, nose & mouth patent, mucous membranes moist  CHEST: lungs CTA bilaterally, no increased work of breathing, no wheeze, rales, rhonchi  HEART: RRR, S1 & S2, no murmur  ABD: soft, nontender, nondistended, no guarding or rigidity, +BS in all 4 quadrants, suprapubic catheter in place draining clear yellow urine  MSK: contractures present, moves BUE, L>R, follows commands  SKIN: warm & dry without rash, no pedal edema    Data   Recent Labs   Lab 01/16/20  0801 01/15/20  0336   WBC 3.9* 4.7   HGB 11.2* 12.1*   MCV 99 100    165    141   POTASSIUM 4.0 3.9   CHLORIDE 113* 110*   CO2 26 25   BUN 14 19   CR 0.65* 0.71   ANIONGAP 2* 6   BREANN 8.4* 8.3*   GLC 90 93   ALBUMIN 2.2* 2.4*   PROTTOTAL 6.6* 7.2   BILITOTAL 0.1* 0.2   ALKPHOS 59 65   ALT 10 11   AST 11 14     Recent Results (from the past 24 hour(s))   XR Video Swallow w/o Esophagram w/SLP or OT    Narrative    VIDEO SWALLOWING EVALUATION   1/17/2020 12:00 PM     HISTORY:  Esophageal follow through; assess pharyngeal phase.    COMPARISON: None.    FLUOROSCOPY TIME: 1.2 minutes.  SPOT IMAGES OR CINE RUNS: 6      Impression    IMPRESSION:  Exam was difficult given the patient positioning.    Thin: Silent aspiration.    Nectar: Deep penetration without definite aspiration.    Honey: Deep penetration without definite aspiration.    Pudding: Not administered.    Semisolid: Not administered.    Solid: Not administered.    This study only includes the cervical esophagus.    BLAIR POZO MD     Medications       apixaban ANTICOAGULANT  2.5 mg Oral BID     cefTRIAXone  2 g Intravenous Q24H     finasteride  5 mg Oral Daily     multivitamin w/minerals  1  tablet Oral Daily     perampanel  4 mg Oral At Bedtime     senna-docusate  1 tablet Oral BID     simvastatin  10 mg Oral At Bedtime     sodium chloride (PF)  3 mL Intracatheter Q8H     valproic acid  1,000 mg Oral At Bedtime     valproic acid  500 mg Oral QAM     zonisamide  300 mg Oral QAM

## 2020-01-17 NOTE — PLAN OF CARE
Day 2814-9221. Disoriented to situation and forgetful, inconsistent with following commands. Neuro: slightly slurred speech, L facial droop, L arm is slightly contracted, BLE 1/5. VSS. Pt. Had a swallow study completed today, NPO. Takes pills whole with water. T&Rq2hrs, Up with A2 and lift. Denies pain. Suprapubic catheter patency maintained, replaced in AM via Urology. Incontinent of bowel. Discharge back to LTC, pending, nursing will continue to monitor.

## 2020-01-17 NOTE — PLAN OF CARE
Discharge Planner PT   Patient plan for discharge: Return to LTC per chart review.   Current status: Per discussion with OT,who spoke to pt's brother, pt has been non-ambulatory for the past several years. Pt is WC dependent for mobility and lift dependent for transfers. Pt has recently been transferred to a LTC facility. No need for acute skilled PT is identified at this time. Anticipate discharge back to LTC when medically stable. PT will complete orders at this time.   Barriers to return to prior living situation: None anticipated to LTC.   Recommendations for discharge: Return to LTC   Rationale for recommendations: No skilled needs identified.        Entered by: Gina Rahman 01/17/2020 4:03 PM

## 2020-01-17 NOTE — PLAN OF CARE
Discharge Planner SLP   Patient plan for discharge: Did not state  Current status: SLP: Pt presents with moderate to severe dysphagia on Video Swallow Study. Deficits identified include: poor a/p propulsion, reduced tongue base retraction, incomplete epiglottic inversion, and significantly reduced pharyngeal constriction. These deficits resulted in: premature spillage, deep penetration with eventual silent aspiration of thin liquids with remaining laryngeal vestibule residue. Difficult to determine additional penetration vs residue with nectar and honey thick liquids however, suspect deep penetration and continued residue. Puree textures trialed with spillage to the valleculae, delayed initiation, and severe pyriform residue after the swallow. No penetration observed, however, high risk due to amount of residue. Images reviewed with pt. Discussed high risk of aspiration with all PO intake. Pt has a history of PEG tube/trach in 2010 following CVA, however, has been eating a regular diet/thin liquids since 2011. Pt stated that he does not wish to have  more tubes , however, unsure level of comprehension as he diverted to different topics. Pt in agreement to discuss with his brother. Called brother and education provided on VFSS results. Brother verbalized agreement that pt would not want a feeding tube, however, deferred to pt wishes.     Due to high risk of aspiration with all PO, would recommend NPO with consideration of alternative feeding. Defer further discussion to MD. If pt would like to continue eating and drinking understanding the risk of aspiration, least hazardous diet would be nectar thick by spoon and puree solids with 1:1 supervision to take small bites and sip, slow rate, alternate solids and liquids, double swallows. MD paged.    Barriers to return to prior living situation: Dysphagia  Recommendations for discharge: Return to LTC  Rationale for recommendations: Continue ST services for pt/family  education and trial of pharyngeal exercises        Entered by: Radha Booker 01/17/2020 1:32 PM

## 2020-01-17 NOTE — PLAN OF CARE
"OT - attempted to determine patient's baseline for activity.  Patient reports he \"walked up and down the halls all day\", dressed and fed himself independently.  Per nursing intake note, patient is \"total cares\" at baseline (ED note 4:45am 1/15/2020). Phone call placed to patient's care facility [facility], but nursing stated they had not worked with him before, and the NA had not worked with him before, and the chart did not indicate any baseline activity level.  Attempted to call brother (emergency contact) but his number was out of service.  Unable to resolve discrepancy of baseline activity.  Will assess as able.      Update: Nursing able to speak with brother when he called.  Per brother, patient had moved to [facility] from [place] the same day he was then admitted to [facility].  Per brother, pt is wheelchair bound, and a lift transfer at LTC.  He is total cares for ADLs, and has occasional times of being oriented, but is generally disoriented.    Due to patient being total cares and lift transfer at baseline, OT/PT services not indicated during hospital stay.  Will complete orders.  Please reorder if needs change.  "

## 2020-01-17 NOTE — PROGRESS NOTES
SW:  D: ROSALIA spoke with Brett at [facility] yesterday regarding face sheet indicates that pt's brother is power of  for pt.  Pt's record has no forms indicating POA.  I: ROSALIA contacted pt's brother to inquire about the POA listing and he indicates that he is pt's Power of  and will send forms to ROSALIA to have uploaded into pt chart.  Jeff indicates that he does assist pt with decision making but does NOT have official  guardianship of pt.  Power of  forms sent to [facility] for review and scanning into pt chart.  P: Anticipate that pt will be returning to [facility] once medically stable.    JORDON Jiménez  [facility]  [phone]

## 2020-01-17 NOTE — PLAN OF CARE
Pt here with AMS. A&O to self, place and time. Neuros with left facial droop, slurred speech, LLE strength 1/5, right LER strength 2/5. DD2 diet with thin liquids. Chest Xray completed this afternoon. Turned and repositioned q 2 hr. Denies pain. Pt scoring green on the Aggression Stop Light Tool.

## 2020-01-17 NOTE — PROGRESS NOTES
01/17/20 1449   General Information   Onset Date 01/15/20   Start of Care Date 01/16/20   Referring Physician Dr. Chan   Patient Profile Review/OT: Additional Occupational Profile Info See Profile for full history and prior level of function   Patient/Family Goals Statement did not state   Swallowing Evaluation Videofluoroscopic evaluation   Behaviorial Observations Alert;Confused   Mode of current nutrition Oral diet   Type of oral diet Regular;Thin liquid   Respiratory Status Room air   Comments Admitted with seizures. Complicated hx of CP, CVA, and trach/PEG in 2010.    VFSS Eval: Thin Liquid Texture Trial   Mode of Presentation, Thin Liquid cup;spoon;self-fed;fed by clinician   Preparatory Phase Holding;Poor bolus control   Oral Phase, Thin Liquid Poor AP movement;Premature pharyngeal entry   Pharyngeal Phase, Thin Liquid Delayed swallow reflex;Residue in valleculae;Residue in pyriform sinus;Pharyngeal wall coating   Rosenbek's Penetration Aspiration Scale: Thin Liquid Trial Results 8 - contrast passes glottis, visible subglottic residue remains, absent patient response (aspiration)   Response to Aspiration absent response, silent aspiration   Diagnostic Statement silent aspiration   VFSS Eval: Nectar Thick Liquid Texture Trial   Mode of Presentation, Nectar spoon;fed by clinician   Preparatory Phase Holding;Poor bolus control   Oral Phase, Nectar Poor AP movement;Premature pharyngeal entry   Pharyngeal Phase, Nectar Delayed swallow reflex;Pharyngeal wall coating;Residue in valleculae;Residue in pyriform sinus   Rosenbek's Penetration Aspiration Scale: Nectar-Thick Liquid Trial Results 5 - contrast contacts vocal cords, visible residue remains (penetration)   Diagnostic Statement difficult to determine new penetration vs residue from thin liquids; suspect deep penetration   VFSS Eval: Honey Thick Texture Trial   Mode of Presentation, Honey spoon;fed by clinician   Preparatory Phase Holding;Poor bolus control    Oral Phase, Honey Poor AP movement;Residue in oral cavity;Premature pharyngeal entry   Pharyngeal Phase, Honey Delayed swallow reflex;Pharyngeal wall coating;Residue in valleculae;Residue in pyriform sinus   Rosenbek's Penetration Aspiration Scale: Honey Trial Results 5 - contrast contacts vocal cords, visible residue remains (penetration)   Diagnostic Statement deep penetration   VFSS Eval: Puree Solid Texture Trial   Mode of Presentation, Puree spoon;self-fed   Preparatory Phase Holding;Poor bolus control   Oral Phase, Puree Poor AP movement;Residue in oral cavity;Premature pharyngeal entry   Pharyngeal Phase, Puree Delayed swallow reflex;Pharyngeal wall coating;Residue in valleculae;Residue in pyriform sinus   Rosenbek's Penetration Aspiration Scale: Puree Food Trial Results 1 - no aspiration, contrast does not enter airway   Diagnostic Statement no penetration or aspiration; severe pyriform residue   General Therapy Interventions   Planned Therapy Interventions Dysphagia Treatment   Dysphagia treatment Oropharyngeal exercise training;Instruction of safe swallow strategies   Swallow Eval: Clinical Impressions   Skilled Criteria for Therapy Intervention Skilled criteria met.  Treatment indicated.   Functional Assessment Scale (FAS) 2   Dysphagia Outcome Severity Scale (DAQUAN) Level 2 - DAQUAN   Treatment Diagnosis moderate to severe dysphagia   Diet texture recommendations NPO   Demonstrates Need for Referral to Another Service dietitian   Therapy Frequency 5x/week   Predicted Duration of Therapy Intervention (days/wks) 1 week   Anticipated Discharge Disposition extended care facility   Risks and Benefits of Treatment have been explained. Yes   Patient, family and/or staff in agreement with Plan of Care Yes   Clinical Impression Comments SLP: Pt presents with moderate to severe dysphagia on Video Swallow Study. Deficits identified include: poor a/p propulsion, reduced tongue base retraction, incomplete epiglottic  "inversion, and significantly reduced pharyngeal constriction. These deficits resulted in: premature spillage, deep penetration with eventual silent aspiration of thin liquids with remaining laryngeal vestibule residue. Difficult to determine additional penetration vs residue with nectar and honey thick liquids however, suspect deep penetration and continued residue. Puree textures trialed with spillage to the valleculae, delayed initiation, and severe pyriform residue after the swallow. No penetration observed, however, high risk due to amount of residue. Images reviewed with pt. Discussed high risk of aspiration with all PO intake. Pt has a history of PEG tube/trach in 2010 following CVA, however, has been eating a regular diet/thin liquids since 2011. Pt stated that he does not wish to have \"more tubes\", however, unsure level of comprehension as he diverted to different topics. Pt in agreement to discuss with his brother. Called brother and education provided on VFSS results. Brother verbalized agreement that pt would not want a feeding tube, however, deferred to pt wishes. Due to high risk of aspiration with all PO, would recommend NPO with consideration of alternative feeding. Defer further discussion to MD. If pt would like to continue eating and drinking understanding the risk of aspiration, least hazardous diet would be nectar thick by spoon and puree solids with 1:1 supervision to take small bites and sip, slow rate, alternate solids and liquids, double swallows. MD paged.   Total Evaluation Time   Total Evaluation Time (Minutes) 25     "

## 2020-01-18 ENCOUNTER — APPOINTMENT (OUTPATIENT)
Dept: SPEECH THERAPY | Facility: CLINIC | Age: 75
DRG: 698 | End: 2020-01-18
Payer: COMMERCIAL

## 2020-01-18 PROBLEM — G40.909 SEIZURE DISORDER (H): Status: ACTIVE | Noted: 2020-01-18

## 2020-01-18 PROBLEM — G92.9 TOXIC ENCEPHALOPATHY: Status: ACTIVE | Noted: 2020-01-18

## 2020-01-18 PROBLEM — R33.9 URINARY RETENTION WITH INCOMPLETE BLADDER EMPTYING: Status: ACTIVE | Noted: 2020-01-18

## 2020-01-18 PROBLEM — I69.30 HISTORY OF CVA WITH RESIDUAL DEFICIT: Status: ACTIVE | Noted: 2020-01-18

## 2020-01-18 LAB
BACTERIA SPEC CULT: ABNORMAL
Lab: ABNORMAL
SPECIMEN SOURCE: ABNORMAL

## 2020-01-18 PROCEDURE — 25000132 ZZH RX MED GY IP 250 OP 250 PS 637: Performed by: INTERNAL MEDICINE

## 2020-01-18 PROCEDURE — 99232 SBSQ HOSP IP/OBS MODERATE 35: CPT | Performed by: INTERNAL MEDICINE

## 2020-01-18 PROCEDURE — 25000128 H RX IP 250 OP 636: Performed by: HOSPITALIST

## 2020-01-18 PROCEDURE — 92526 ORAL FUNCTION THERAPY: CPT | Mod: GN

## 2020-01-18 PROCEDURE — 25800030 ZZH RX IP 258 OP 636: Performed by: PHYSICIAN ASSISTANT

## 2020-01-18 PROCEDURE — 12000000 ZZH R&B MED SURG/OB

## 2020-01-18 RX ORDER — AMOXICILLIN AND CLAVULANATE POTASSIUM 400; 57 MG/5ML; MG/5ML
750 POWDER, FOR SUSPENSION ORAL 2 TIMES DAILY
Status: DISCONTINUED | OUTPATIENT
Start: 2020-01-18 | End: 2020-01-19 | Stop reason: HOSPADM

## 2020-01-18 RX ORDER — AMOXICILLIN AND CLAVULANATE POTASSIUM 400; 57 MG/5ML; MG/5ML
750 POWDER, FOR SUSPENSION ORAL 2 TIMES DAILY
Qty: 75.2 ML | Refills: 0
Start: 2020-01-18 | End: 2020-03-02

## 2020-01-18 RX ORDER — AMOXICILLIN AND CLAVULANATE POTASSIUM 400; 57 MG/5ML; MG/5ML
750 POWDER, FOR SUSPENSION ORAL 2 TIMES DAILY
Qty: 75.2 ML | Refills: 0
Start: 2020-01-18 | End: 2020-01-18

## 2020-01-18 RX ADMIN — AMOXICILLIN AND CLAVULANATE POTASSIUM 750 MG: 400; 57 POWDER, FOR SUSPENSION ORAL at 20:55

## 2020-01-18 RX ADMIN — SIMVASTATIN 10 MG: 10 TABLET, FILM COATED ORAL at 20:53

## 2020-01-18 RX ADMIN — CEFTRIAXONE 2 G: 2 INJECTION, POWDER, FOR SOLUTION INTRAMUSCULAR; INTRAVENOUS at 06:04

## 2020-01-18 RX ADMIN — PERAMPANEL 4 MG: 4 TABLET ORAL at 20:52

## 2020-01-18 RX ADMIN — SODIUM CHLORIDE: 9 INJECTION, SOLUTION INTRAVENOUS at 03:22

## 2020-01-18 RX ADMIN — SENNOSIDES AND DOCUSATE SODIUM 1 TABLET: 8.6; 5 TABLET ORAL at 20:53

## 2020-01-18 RX ADMIN — FINASTERIDE 5 MG: 5 TABLET, FILM COATED ORAL at 08:51

## 2020-01-18 RX ADMIN — ZONISAMIDE 300 MG: 100 CAPSULE ORAL at 08:50

## 2020-01-18 RX ADMIN — APIXABAN 2.5 MG: 2.5 TABLET, FILM COATED ORAL at 20:53

## 2020-01-18 RX ADMIN — MULTIPLE VITAMINS W/ MINERALS TAB 1 TABLET: TAB at 08:51

## 2020-01-18 RX ADMIN — VALPROIC ACID 1000 MG: 250 CAPSULE, LIQUID FILLED ORAL at 20:50

## 2020-01-18 RX ADMIN — APIXABAN 2.5 MG: 2.5 TABLET, FILM COATED ORAL at 08:51

## 2020-01-18 RX ADMIN — VALPROIC ACID 500 MG: 250 CAPSULE, LIQUID FILLED ORAL at 08:51

## 2020-01-18 RX ADMIN — SENNOSIDES AND DOCUSATE SODIUM 1 TABLET: 8.6; 5 TABLET ORAL at 08:51

## 2020-01-18 ASSESSMENT — ACTIVITIES OF DAILY LIVING (ADL)
ADLS_ACUITY_SCORE: 33

## 2020-01-18 NOTE — PLAN OF CARE
Pt here with altered mental status and UTI. Suprapubic catheter patent, dressing C/D/I. A&O x3, disoriented to situation. Slight forgetfulness noted. Left facial droop, slow, slurred speech, and L-sided hemiparesis noted, LLE weaker than LUE. Ax2 with lift, T/R q2h. No BM this shift. Reported slight pain to left upper thigh, decreased with repositioning and cold pack. Tolerating thin liquids. Plan to discharge back to [facility], possibly today. Continue with current POC.

## 2020-01-18 NOTE — DISCHARGE SUMMARY
[facility]    Discharge Summary  Hospitalist    Date of Admission:  1/15/2020  Date of Discharge:  1/19/2020  Discharging Provider: Dioni Miller MD    Discharge Diagnoses   Principal Problem:    Catheter-associated urinary tract infection -- multiple organisms, related to suprapubic catheter   -- >100K Klebsiella, Proteus, and Enterococcus, all sensitive to Augmentin       Toxic encephalopathy -- related to UTI, resolved     Active Problems:    Hx of Right CVA with Left hemiparesis -- 2010        Urinary retention, S/P Suprapubic Catheter       Seizure disorder related to CVA       History of Present Illness   74 year old gentleman with prior right CVA in 2010 and residual left hemiparesis and associated seizure disorder and probable vascular dementia and neurogenic bladder with suprapubic catheter (Brother is POA) and been at [facility] for approx 5 yrs who presents with confusion noticed earlier in the day by staff at [facility]; constant since onset and progressively worse; characterized as inability to carry on a conversation. Normally he quite able to do so. He tells me he has pain when he tries to void through his catheter.      In the ED, Blood pressure 115/73, temperature 97.1  F (36.2  C), temperature source Temporal, resp. rate 20, weight 77.5 kg (170 lb 13.7 oz), SpO2 95 %.     CBC showed WBC 4, HGB 12 (11 on 10/2019), . CMP notable for Cl 110, Ca 8.3, Alb 2.4, otherwise in normal reference range. UA showed 78 WBC, 5 RBC, large LE, negative nitrites, and positive for triple phosphates. Exam with confusion and left hemiparesis, and CT Head without contrast showed only a chronic moderate sized right MCA infarct. He was given Ceftriaxone.    Hospital Course   Admitted to neurology floor, encephalopathy resolved in 2-3 days.  Was initially treated with IV Ceftriaxone, but urine culture grew >100K Klebsiella, Proteus, and Enterococcus, all sensitive to Augmentin -- and switched  to Augmentin 750 mg bid for another week and then will check UA and UC 1 week after antibiotics discontinued.  Blood cultures were negative.     Will continue suprapubic catheter to gravity drainage, and change monthly, and if problems with persistent UTI or frequent recurrence in UTI one may consider Hiprex and Vit C to try to help prevent recurrent catheter associated UTI's.  Discussed with brother who is POA, and he also confirmed DNR/DNI status which patient was in agreement with.     Patient also did have a swallow eval here, did have slight silent aspiration with thin liquids on video swallow evaluation, but clinically has not experienced aspiration pneumonia, and patient and his brother would prefer to continue thin liquids and mechanically altered diet, as he has done in the past.      Dioni Miller MD, MD  Pager: [phone]  Cell Phone:  [phone]       Significant Results and Procedures   As above    Pending Results   These results will be followed up by Dr. Miller  Unresulted Labs Ordered in the Past 30 Days of this Admission     Date and Time Order Name Status Description    1/15/2020 0431 Blood culture Preliminary     1/15/2020 0431 Blood culture Preliminary           Code Status   DNR / DNI       Primary Care Physician   YOSHI GAFFNEY    Physical Exam   Temp: 97.3  F (36.3  C) Temp src: Oral BP: 121/55   Heart Rate: 64 Resp: 16 SpO2: 100 % O2 Device: None (Room air)    Vitals:    01/15/20 0329 01/15/20 1453 01/18/20 0611   Weight: 77.5 kg (170 lb 13.7 oz) 72.3 kg (159 lb 4.8 oz) 71.1 kg (156 lb 12.8 oz)     Vital Signs with Ranges  Temp:  [97.3  F (36.3  C)-98.6  F (37  C)] 97.3  F (36.3  C)  Heart Rate:  [59-68] 64  Resp:  [16-18] 16  BP: ()/(47-64) 121/55  SpO2:  [96 %-100 %] 100 %  I/O last 3 completed shifts:  In: 2680 [P.O.:780; I.V.:1900]  Out: 1250 [Urine:1250]    Exam on discharge:   Lungs clear  CV with reg S1S2  Neuro - alert, Ox2.5, left sided weakness and mild  dysarthria    Discharge Disposition   Discharged to long-term care facility  Condition at discharge: Fair    Consultations This Hospital Stay   NEUROLOGY IP CONSULT  UROLOGY IP CONSULT  PHYSICAL THERAPY ADULT IP CONSULT  OCCUPATIONAL THERAPY ADULT IP CONSULT  SPEECH LANGUAGE PATH ADULT IP CONSULT  SOCIAL WORK IP CONSULT    Time Spent on this Encounter   I spent a total of 35 minutes discharging this patient.     Discharge Orders      General info for SNF    Length of Stay Estimate: Long Term Care  Condition at Discharge: Improving  Level of care:skilled   Rehabilitation Potential: Fair  Admission H&P remains valid and up-to-date: Yes  Recent Chemotherapy: N/A  Use Nursing Home Standing Orders: Yes     Mantoux instructions    Give two-step Mantoux (PPD) Per Facility Policy No (if no explain) -- just there     Reason for your hospital stay    Confusion related to bladder infection, which is related to supra-pubic catheter.     Additional Discharge Instructions    Call Dr. Díaz if any medical questions at Cell Phone [phone].     Shearer catheter    Supra-pubic catheter to gravity drainage, and change catheter monthly.     Activity - Up with nursing assistance     Follow Up and recommended labs and tests    Follow-up in 1 week with NH provider, and would check UA and UC in 2 weeks.  If recurrent UTI's related to supra-pubic catheter one may consider Hiprex along with vitamin C to help prevent catheter associated bladder infections.     DNR/DNI    Discussed with brother     Advance Diet as Tolerated    Follow this diet upon discharge: Orders Placed This Encounter      Room Service      Snacks/Supplements Adult: Boost Plus; Between Meals      Combination Diet Dysphagia Diet Level 2: Mechan Altered; Thin Liquids     Discharge Medications   Current Discharge Medication List      START taking these medications    Details   amoxicillin-clavulanate (AUGMENTIN) 400-57 MG/5ML suspension Take 9.4 mLs (750 mg) by mouth 2  times daily for 7 days  Qty: 75.2 mL, Refills: 0    Associated Diagnoses: Urinary retention with incomplete bladder emptying         CONTINUE these medications which have NOT CHANGED    Details   acetaminophen (TYLENOL) 325 MG tablet Take 650 mg by mouth every 6 hours as needed for mild pain      apixaban ANTICOAGULANT (ELIQUIS) 2.5 MG tablet Take 2.5 mg by mouth 2 times daily      calcium carbonate (TUMS) 500 MG chewable tablet Take 1 chew tab by mouth 2 times daily      Finasteride (PROSCAR PO) Take 5 mg by mouth daily    Associated Diagnoses: Progressive myoclonus epilepsy (H)      glycopyrrolate (ROBINUL) 1 MG tablet Take 1 mg by mouth 3 times daily    Associated Diagnoses: Other forms of epilepsy and recurrent seizures with intractable epilepsy      multivitamin w/minerals (MULTI-VITAMIN) tablet Take 1 tablet by mouth daily      perampanel (FYCOMPA) 4 MG tablet Take 4 mg by mouth At Bedtime      polyethylene glycol (MIRALAX) powder Take 17 g by mouth every morning Mix in 8 oz of liquid.      senna-docusate (SENOKOT-S/PERICOLACE) 8.6-50 MG tablet Take 1 tablet by mouth 2 times daily      simvastatin (ZOCOR) 10 MG tablet Take 10 mg by mouth At Bedtime      !! valproic acid (DEPAKENE) 250 MG capsule Take 500 mg by mouth every morning      !! valproic acid (DEPAKENE) 250 MG capsule Take 1,000 mg by mouth At Bedtime      zonisamide (ZONEGRAN) 100 MG capsule Take 300 mg by mouth every morning       !! - Potential duplicate medications found. Please discuss with provider.        Allergies   No Known Allergies  Data   Most Recent 3 CBC's:  Recent Labs   Lab Test 01/19/20  0441 01/16/20  0801 01/15/20  0336   WBC 5.4 3.9* 4.7   HGB 11.0* 11.2* 12.1*    99 100    174 165      Most Recent 3 BMP's:  Recent Labs   Lab Test 01/19/20  0441 01/16/20  0801 01/15/20  0336    141 141   POTASSIUM 4.4 4.0 3.9   CHLORIDE 115* 113* 110*   CO2 24 26 25   BUN 16 14 19   CR 0.69 0.65* 0.71   ANIONGAP 2* 2* 6   BREANN  8.2* 8.4* 8.3*   GLC 98 90 93     Most Recent 2 LFT's:  Recent Labs   Lab Test 01/16/20  0801 01/15/20  0336   AST 11 14   ALT 10 11   ALKPHOS 59 65   BILITOTAL 0.1* 0.2     Most Recent INR's and Anticoagulation Dosing History:  Anticoagulation Dose History     There is no flowsheet data to display.        Most Recent 3 Troponin's:No lab results found.  Most Recent Cholesterol Panel:No lab results found.  Most Recent 6 Bacteria Isolates From Any Culture (See EPIC Reports for Culture Details):  Recent Labs   Lab Test 01/15/20  0440 01/15/20  0337 01/15/20  0336   CULT No growth after 4 days >100,000 colonies/mL  Klebsiella pneumoniae  *  >100,000 colonies/mL  Proteus mirabilis  *  >100,000 colonies/mL  Enterococcus faecalis  * No growth after 4 days     Most Recent TSH, T4 and A1c Labs:No lab results found.

## 2020-01-18 NOTE — PLAN OF CARE
Pt here for AMS with UTI. A&Ox3. D/o situation. Pt slow to respond. Neuros: L droop and L side weakness.  VSS. Up lift otherwise bedrest. DD2 thin liquid. Takes meds whole with pudding. Suprapubic catheter patent. No bm this shift. Denies pain. Pt scoring green on the aggression stoplight tool. Plan for discharge back to facility tomorrow.

## 2020-01-18 NOTE — PROGRESS NOTES
Rosalia Progress Note  Chart Reviewed, Pt discussed in Interdisciplinary Rounds.   Pt anticipated to return to [facility] at discharge.    Intervention:   ROSALIA contacted admissions and spoke with Rosio. She indicates that they will need to have nursing reassess pt since he has been gone over 48 hours.  Facility requesting discharge tomorrow to allow time to assess pt. [facility] also having multiple admissions today already.  Discharge orders sent for review.  Pt will require stretcher transport at discharge. Pt has history of stroke, cerebral palsy, hemiparesis and intermittent confusion. Pt has been bed bound in hospital and requires supervision for safe transport.  PCS completed faxed and placed on pt's chart for time of transport.  Rosio updated with transport time tomorrow. No PAS required as pt returning to LTC.     Team Members notified:   JULIO CÉSAR,RN,HUC,BB      Plan: Discharge back to [facility]  via HE stretcher at 10:30am on Sunday 1/19/2020    Sherri Bullock [facility]  [phone]

## 2020-01-18 NOTE — PLAN OF CARE
Discharge Planner SLP   Patient plan for discharge: return to LTC tomorrow  Current status: Swallow tx completed with emphasis on education to pt and also brother via phone. Discussion re: video swallow study with significant dysphagia aspiration risk. Note that per discussion prior day pt/brother made decision to continue PO despite risk. Safest possible PO recs were nectar/puree with strategies though per discussion with brother would like to continue current diet of DD2/thin with re-discussion with nursing home tomorrow while he is there as well.   Barriers to return to prior living situation: no SLP barriers  Recommendations for discharge: LTC  Rationale for recommendations: Will need SLP follow up for education, strategies, exercises as able, PO tolerance. Consider ENT consult and voice evaluation/tx as well.        Entered by: Trinity Francis 01/18/2020 4:41 PM

## 2020-01-18 NOTE — PLAN OF CARE
Pt admitted for acute metabolic encephalopathy from UTI. Alert. Disoriented to situation, forgetful. Neuro intact except baseline deficits of L droop, slow, slurred speech, Lf hemiparesis, BLE weakness. Denies pain. Tolerating DD2, thin diet, takes pills whole with water. Turned and repositioned while in bed every 2 hours, on pulsate mattress. Skin intact except Lf foot wound, intact, NICHOLE. Incontinent of bowel. Suprapubic catheter intact with adequate output. IV SL'd. Pt is scoring Green on the Stoplight Aggression Tool. Plan for discharge back to [facility] tomorrow at 10:30 via HE Stretcher.     3-7 PM addendum: Incontinent of bowel x2. Up to chair for dinner.

## 2020-01-19 VITALS
BODY MASS INDEX: 23.9 KG/M2 | OXYGEN SATURATION: 100 % | RESPIRATION RATE: 16 BRPM | TEMPERATURE: 97.3 F | HEART RATE: 64 BPM | WEIGHT: 156.8 LBS | DIASTOLIC BLOOD PRESSURE: 55 MMHG | SYSTOLIC BLOOD PRESSURE: 121 MMHG

## 2020-01-19 LAB
ANION GAP SERPL CALCULATED.3IONS-SCNC: 2 MMOL/L (ref 3–14)
BUN SERPL-MCNC: 16 MG/DL (ref 7–30)
CALCIUM SERPL-MCNC: 8.2 MG/DL (ref 8.5–10.1)
CHLORIDE SERPL-SCNC: 115 MMOL/L (ref 94–109)
CO2 SERPL-SCNC: 24 MMOL/L (ref 20–32)
CREAT SERPL-MCNC: 0.69 MG/DL (ref 0.66–1.25)
ERYTHROCYTE [DISTWIDTH] IN BLOOD BY AUTOMATED COUNT: 14.9 % (ref 10–15)
GFR SERPL CREATININE-BSD FRML MDRD: >90 ML/MIN/{1.73_M2}
GLUCOSE SERPL-MCNC: 98 MG/DL (ref 70–99)
HCT VFR BLD AUTO: 33.8 % (ref 40–53)
HGB BLD-MCNC: 11 G/DL (ref 13.3–17.7)
MCH RBC QN AUTO: 32.4 PG (ref 26.5–33)
MCHC RBC AUTO-ENTMCNC: 32.5 G/DL (ref 31.5–36.5)
MCV RBC AUTO: 100 FL (ref 78–100)
PLATELET # BLD AUTO: 168 10E9/L (ref 150–450)
POTASSIUM SERPL-SCNC: 4.4 MMOL/L (ref 3.4–5.3)
RBC # BLD AUTO: 3.39 10E12/L (ref 4.4–5.9)
SODIUM SERPL-SCNC: 141 MMOL/L (ref 133–144)
WBC # BLD AUTO: 5.4 10E9/L (ref 4–11)

## 2020-01-19 PROCEDURE — 36415 COLL VENOUS BLD VENIPUNCTURE: CPT | Performed by: INTERNAL MEDICINE

## 2020-01-19 PROCEDURE — 99239 HOSP IP/OBS DSCHRG MGMT >30: CPT | Performed by: INTERNAL MEDICINE

## 2020-01-19 PROCEDURE — 80048 BASIC METABOLIC PNL TOTAL CA: CPT | Performed by: INTERNAL MEDICINE

## 2020-01-19 PROCEDURE — 25000132 ZZH RX MED GY IP 250 OP 250 PS 637: Performed by: INTERNAL MEDICINE

## 2020-01-19 PROCEDURE — 85027 COMPLETE CBC AUTOMATED: CPT | Performed by: INTERNAL MEDICINE

## 2020-01-19 RX ADMIN — FINASTERIDE 5 MG: 5 TABLET, FILM COATED ORAL at 09:31

## 2020-01-19 RX ADMIN — AMOXICILLIN AND CLAVULANATE POTASSIUM 750 MG: 400; 57 POWDER, FOR SUSPENSION ORAL at 09:36

## 2020-01-19 RX ADMIN — MULTIPLE VITAMINS W/ MINERALS TAB 1 TABLET: TAB at 09:31

## 2020-01-19 RX ADMIN — APIXABAN 2.5 MG: 2.5 TABLET, FILM COATED ORAL at 09:31

## 2020-01-19 RX ADMIN — SENNOSIDES AND DOCUSATE SODIUM 1 TABLET: 8.6; 5 TABLET ORAL at 09:30

## 2020-01-19 RX ADMIN — ZONISAMIDE 300 MG: 100 CAPSULE ORAL at 09:30

## 2020-01-19 RX ADMIN — VALPROIC ACID 500 MG: 250 CAPSULE, LIQUID FILLED ORAL at 09:31

## 2020-01-19 ASSESSMENT — ACTIVITIES OF DAILY LIVING (ADL)
ADLS_ACUITY_SCORE: 33

## 2020-01-19 NOTE — PLAN OF CARE
Pt admitted for acute metabolic encephalopathy from UTI. Alert and oriented x4, but forgetful. Neuro intact except baseline deficits of L droop, slow, slurred speech, Lf hemiparesis, BLE weakness. Denies pain. Tolerating DD2, thin diet, takes pills whole with water. Turned and repositioned while in bed every 2 hours, on pulsate mattress, up to chair for breakfast with lift. Skin intact except Lf foot wound, intact, NICHOLE and buttock with blanchable erythema. Incontinent of bowel. Suprapubic catheter intact with adequate output, cloudy. Pt is scoring Green on the Stoplight Aggression Tool. Discharge planned for [facility] at 1030. Education reviewed with patient.

## 2020-01-19 NOTE — PLAN OF CARE
Pt here with UTI/acute metabolic encephalopathy. A&O x3, disoriented to situation. Forgetfulness noted. Neuros unchanged with Left droop, slow/slurred speech, L-sided hemiparesis, BLE weakness, requiring patient to lift legs with hands when asked. Incontinent bowel, suprapubic catheter in place and draining adequate output.  Denies pain. Tolerating DD2 diet with thin liquids. T/R q2h, declining repositioning at 0200, refusing 0000 neuro assessment. Plan to discharge to [facility] at 1030, HE stretcher for transport. Continue with current POC.

## 2020-01-19 NOTE — PLAN OF CARE
"Speech Language Therapy Discharge Summary    Reason for therapy discharge:    Discharged to long term care facility.    Progress towards therapy goal(s). See goals on Care Plan in [facility] electronic health record for goal details.  Goals not met.  Barriers to achieving goals:   discharge from facility.    Therapy recommendation(s):    Continued therapy is recommended.  Rationale/Recommendations:  Continue ST services for pt/family education, determinations of diet recommendations and trial of pharyngeal exercises .    Per SLP who completed pt's video swallow study \"Due to high risk of aspiration with all PO, would recommend NPO with consideration of alternative feeding. Defer further discussion to MD. If pt would like to continue eating and drinking understanding the risk of aspiration, least hazardous diet would be nectar thick by spoon and puree solids with 1:1 supervision to take small bites and sip, slow rate, alternate solids and liquids, double swallows.\"    Pt/collette wishes to continue eating however diet was kept at DD2/thin during admission with brother wanting to discuss best options once pt returned to facility.   "

## 2020-01-19 NOTE — PROGRESS NOTES
Sw Progress Note  Chart Reviewed, Pt discussed in Interdisciplinary Rounds.   Pt ant;iicpated to discharge back to [facility] today via HE stretcher.    Intervention: SW called [facility] and left two messages on the admissions line and on the admission cell phone to confirm pt's return today.  SW awaiting return call.     Team Members notified:   RENÉN,RN,HUC,BB    Plan: Discharge back to [facility] today at 10:30am.    JORDON Jiménez  [facility]  [phone]

## 2020-01-21 LAB
BACTERIA SPEC CULT: NO GROWTH
BACTERIA SPEC CULT: NO GROWTH
Lab: NORMAL
Lab: NORMAL
SPECIMEN SOURCE: NORMAL
SPECIMEN SOURCE: NORMAL

## 2020-03-02 ENCOUNTER — APPOINTMENT (OUTPATIENT)
Dept: SPEECH THERAPY | Facility: CLINIC | Age: 75
DRG: 866 | End: 2020-03-02
Attending: PHYSICIAN ASSISTANT
Payer: COMMERCIAL

## 2020-03-02 ENCOUNTER — HOSPITAL ENCOUNTER (INPATIENT)
Facility: CLINIC | Age: 75
LOS: 3 days | Discharge: NURSING, REHAB OR LONG-TERM CARE | DRG: 866 | End: 2020-03-05
Attending: EMERGENCY MEDICINE | Admitting: INTERNAL MEDICINE
Payer: COMMERCIAL

## 2020-03-02 ENCOUNTER — APPOINTMENT (OUTPATIENT)
Dept: CT IMAGING | Facility: CLINIC | Age: 75
DRG: 866 | End: 2020-03-02
Attending: EMERGENCY MEDICINE
Payer: COMMERCIAL

## 2020-03-02 ENCOUNTER — APPOINTMENT (OUTPATIENT)
Dept: GENERAL RADIOLOGY | Facility: CLINIC | Age: 75
DRG: 866 | End: 2020-03-02
Attending: EMERGENCY MEDICINE
Payer: COMMERCIAL

## 2020-03-02 DIAGNOSIS — J10.1 INFLUENZA A: Primary | ICD-10-CM

## 2020-03-02 PROBLEM — R41.82 AMS (ALTERED MENTAL STATUS): Status: ACTIVE | Noted: 2020-03-02

## 2020-03-02 LAB
ALBUMIN SERPL-MCNC: 2.3 G/DL (ref 3.4–5)
ALBUMIN UR-MCNC: 10 MG/DL
ALP SERPL-CCNC: 58 U/L (ref 40–150)
ALT SERPL W P-5'-P-CCNC: 10 U/L (ref 0–70)
AMMONIA PLAS-SCNC: 24 UMOL/L (ref 10–50)
ANION GAP SERPL CALCULATED.3IONS-SCNC: 5 MMOL/L (ref 3–14)
APPEARANCE UR: CLEAR
AST SERPL W P-5'-P-CCNC: 16 U/L (ref 0–45)
BASOPHILS # BLD AUTO: 0 10E9/L (ref 0–0.2)
BASOPHILS NFR BLD AUTO: 0.3 %
BILIRUB SERPL-MCNC: 0.2 MG/DL (ref 0.2–1.3)
BILIRUB UR QL STRIP: NEGATIVE
BUN SERPL-MCNC: 20 MG/DL (ref 7–30)
CALCIUM SERPL-MCNC: 7.8 MG/DL (ref 8.5–10.1)
CHLORIDE SERPL-SCNC: 111 MMOL/L (ref 94–109)
CO2 SERPL-SCNC: 23 MMOL/L (ref 20–32)
COLOR UR AUTO: YELLOW
CREAT SERPL-MCNC: 0.76 MG/DL (ref 0.66–1.25)
DIFFERENTIAL METHOD BLD: ABNORMAL
EOSINOPHIL # BLD AUTO: 0 10E9/L (ref 0–0.7)
EOSINOPHIL NFR BLD AUTO: 0.2 %
ERYTHROCYTE [DISTWIDTH] IN BLOOD BY AUTOMATED COUNT: 15.1 % (ref 10–15)
FLUAV+FLUBV RNA SPEC QL NAA+PROBE: NEGATIVE
FLUAV+FLUBV RNA SPEC QL NAA+PROBE: POSITIVE
GFR SERPL CREATININE-BSD FRML MDRD: >90 ML/MIN/{1.73_M2}
GLUCOSE SERPL-MCNC: 96 MG/DL (ref 70–99)
GLUCOSE UR STRIP-MCNC: NEGATIVE MG/DL
HCT VFR BLD AUTO: 36.8 % (ref 40–53)
HGB BLD-MCNC: 11.7 G/DL (ref 13.3–17.7)
HGB UR QL STRIP: ABNORMAL
IMM GRANULOCYTES # BLD: 0 10E9/L (ref 0–0.4)
IMM GRANULOCYTES NFR BLD: 0.2 %
INTERPRETATION ECG - MUSE: NORMAL
KETONES UR STRIP-MCNC: 5 MG/DL
LACTATE BLD-SCNC: 0.6 MMOL/L (ref 0.7–2)
LEUKOCYTE ESTERASE UR QL STRIP: ABNORMAL
LYMPHOCYTES # BLD AUTO: 2.1 10E9/L (ref 0.8–5.3)
LYMPHOCYTES NFR BLD AUTO: 36.3 %
MCH RBC QN AUTO: 31.6 PG (ref 26.5–33)
MCHC RBC AUTO-ENTMCNC: 31.8 G/DL (ref 31.5–36.5)
MCV RBC AUTO: 100 FL (ref 78–100)
MONOCYTES # BLD AUTO: 0.7 10E9/L (ref 0–1.3)
MONOCYTES NFR BLD AUTO: 12.6 %
MUCOUS THREADS #/AREA URNS LPF: PRESENT /LPF
NEUTROPHILS # BLD AUTO: 2.9 10E9/L (ref 1.6–8.3)
NEUTROPHILS NFR BLD AUTO: 50.4 %
NITRATE UR QL: POSITIVE
NRBC # BLD AUTO: 0 10*3/UL
NRBC BLD AUTO-RTO: 0 /100
PH UR STRIP: 6.5 PH (ref 5–7)
PLATELET # BLD AUTO: 113 10E9/L (ref 150–450)
POTASSIUM SERPL-SCNC: 3.7 MMOL/L (ref 3.4–5.3)
PROT SERPL-MCNC: 6.9 G/DL (ref 6.8–8.8)
RBC # BLD AUTO: 3.7 10E12/L (ref 4.4–5.9)
RBC #/AREA URNS AUTO: 3 /HPF (ref 0–2)
RSV RNA SPEC NAA+PROBE: NEGATIVE
SODIUM SERPL-SCNC: 139 MMOL/L (ref 133–144)
SOURCE: ABNORMAL
SP GR UR STRIP: 1.01 (ref 1–1.03)
SPECIMEN SOURCE: ABNORMAL
TROPONIN I SERPL-MCNC: <0.015 UG/L (ref 0–0.04)
TSH SERPL DL<=0.005 MIU/L-ACNC: 3.04 MU/L (ref 0.4–4)
UROBILINOGEN UR STRIP-MCNC: NORMAL MG/DL (ref 0–2)
VALPROATE SERPL-MCNC: 76 MG/L (ref 50–100)
WBC # BLD AUTO: 5.8 10E9/L (ref 4–11)
WBC #/AREA URNS AUTO: 15 /HPF (ref 0–5)

## 2020-03-02 PROCEDURE — 83605 ASSAY OF LACTIC ACID: CPT | Performed by: EMERGENCY MEDICINE

## 2020-03-02 PROCEDURE — 99207 ZZC APP CREDIT; MD BILLING SHARED VISIT: CPT | Performed by: PHYSICIAN ASSISTANT

## 2020-03-02 PROCEDURE — 87631 RESP VIRUS 3-5 TARGETS: CPT | Performed by: PHYSICIAN ASSISTANT

## 2020-03-02 PROCEDURE — 71045 X-RAY EXAM CHEST 1 VIEW: CPT

## 2020-03-02 PROCEDURE — 0042T CT HEAD PERFUSION WITH CONTRAST: CPT

## 2020-03-02 PROCEDURE — 70450 CT HEAD/BRAIN W/O DYE: CPT

## 2020-03-02 PROCEDURE — 25000128 H RX IP 250 OP 636: Performed by: EMERGENCY MEDICINE

## 2020-03-02 PROCEDURE — 81001 URINALYSIS AUTO W/SCOPE: CPT | Performed by: EMERGENCY MEDICINE

## 2020-03-02 PROCEDURE — 84484 ASSAY OF TROPONIN QUANT: CPT | Performed by: EMERGENCY MEDICINE

## 2020-03-02 PROCEDURE — 87040 BLOOD CULTURE FOR BACTERIA: CPT | Performed by: EMERGENCY MEDICINE

## 2020-03-02 PROCEDURE — 84443 ASSAY THYROID STIM HORMONE: CPT | Performed by: EMERGENCY MEDICINE

## 2020-03-02 PROCEDURE — 40000061 ZZH STATISTIC EEG TIME EA 10 MIN

## 2020-03-02 PROCEDURE — 96360 HYDRATION IV INFUSION INIT: CPT

## 2020-03-02 PROCEDURE — 87086 URINE CULTURE/COLONY COUNT: CPT | Performed by: EMERGENCY MEDICINE

## 2020-03-02 PROCEDURE — 99291 CRITICAL CARE FIRST HOUR: CPT

## 2020-03-02 PROCEDURE — 99223 1ST HOSP IP/OBS HIGH 75: CPT | Mod: AI | Performed by: INTERNAL MEDICINE

## 2020-03-02 PROCEDURE — 80053 COMPREHEN METABOLIC PANEL: CPT | Performed by: EMERGENCY MEDICINE

## 2020-03-02 PROCEDURE — 80203 DRUG SCREEN QUANT ZONISAMIDE: CPT | Performed by: EMERGENCY MEDICINE

## 2020-03-02 PROCEDURE — 25000132 ZZH RX MED GY IP 250 OP 250 PS 637: Performed by: INTERNAL MEDICINE

## 2020-03-02 PROCEDURE — 95816 EEG AWAKE AND DROWSY: CPT

## 2020-03-02 PROCEDURE — 92526 ORAL FUNCTION THERAPY: CPT | Mod: GN | Performed by: SPEECH-LANGUAGE PATHOLOGIST

## 2020-03-02 PROCEDURE — 70496 CT ANGIOGRAPHY HEAD: CPT

## 2020-03-02 PROCEDURE — 25800030 ZZH RX IP 258 OP 636: Performed by: EMERGENCY MEDICINE

## 2020-03-02 PROCEDURE — 25000125 ZZHC RX 250: Performed by: EMERGENCY MEDICINE

## 2020-03-02 PROCEDURE — 92610 EVALUATE SWALLOWING FUNCTION: CPT | Mod: GN | Performed by: SPEECH-LANGUAGE PATHOLOGIST

## 2020-03-02 PROCEDURE — 99292 CRITICAL CARE ADDL 30 MIN: CPT

## 2020-03-02 PROCEDURE — 85025 COMPLETE CBC W/AUTO DIFF WBC: CPT | Performed by: EMERGENCY MEDICINE

## 2020-03-02 PROCEDURE — 25000132 ZZH RX MED GY IP 250 OP 250 PS 637: Performed by: PHYSICIAN ASSISTANT

## 2020-03-02 PROCEDURE — 82140 ASSAY OF AMMONIA: CPT | Performed by: EMERGENCY MEDICINE

## 2020-03-02 PROCEDURE — 96361 HYDRATE IV INFUSION ADD-ON: CPT

## 2020-03-02 PROCEDURE — 25800030 ZZH RX IP 258 OP 636: Performed by: PHYSICIAN ASSISTANT

## 2020-03-02 PROCEDURE — 12000000 ZZH R&B MED SURG/OB

## 2020-03-02 PROCEDURE — 80164 ASSAY DIPROPYLACETIC ACD TOT: CPT | Performed by: EMERGENCY MEDICINE

## 2020-03-02 RX ORDER — FINASTERIDE 5 MG/1
5 TABLET, FILM COATED ORAL DAILY
Status: DISCONTINUED | OUTPATIENT
Start: 2020-03-02 | End: 2020-03-05 | Stop reason: HOSPADM

## 2020-03-02 RX ORDER — POLYETHYLENE GLYCOL 3350 17 G/17G
17 POWDER, FOR SOLUTION ORAL EVERY MORNING
Status: DISCONTINUED | OUTPATIENT
Start: 2020-03-02 | End: 2020-03-05 | Stop reason: HOSPADM

## 2020-03-02 RX ORDER — ACETAMINOPHEN 325 MG/1
650 TABLET ORAL EVERY 4 HOURS PRN
Status: DISCONTINUED | OUTPATIENT
Start: 2020-03-02 | End: 2020-03-05 | Stop reason: HOSPADM

## 2020-03-02 RX ORDER — SIMVASTATIN 10 MG
10 TABLET ORAL AT BEDTIME
Status: DISCONTINUED | OUTPATIENT
Start: 2020-03-02 | End: 2020-03-05 | Stop reason: HOSPADM

## 2020-03-02 RX ORDER — POTASSIUM CHLORIDE 29.8 MG/ML
20 INJECTION INTRAVENOUS
Status: DISCONTINUED | OUTPATIENT
Start: 2020-03-02 | End: 2020-03-05 | Stop reason: HOSPADM

## 2020-03-02 RX ORDER — VALPROIC ACID 250 MG/1
1000 CAPSULE, LIQUID FILLED ORAL AT BEDTIME
Status: DISCONTINUED | OUTPATIENT
Start: 2020-03-02 | End: 2020-03-05 | Stop reason: HOSPADM

## 2020-03-02 RX ORDER — POTASSIUM CHLORIDE 1.5 G/1.58G
20-40 POWDER, FOR SOLUTION ORAL
Status: DISCONTINUED | OUTPATIENT
Start: 2020-03-02 | End: 2020-03-05 | Stop reason: HOSPADM

## 2020-03-02 RX ORDER — POTASSIUM CL/LIDO/0.9 % NACL 10MEQ/0.1L
10 INTRAVENOUS SOLUTION, PIGGYBACK (ML) INTRAVENOUS
Status: DISCONTINUED | OUTPATIENT
Start: 2020-03-02 | End: 2020-03-05 | Stop reason: HOSPADM

## 2020-03-02 RX ORDER — LIDOCAINE 40 MG/G
CREAM TOPICAL
Status: DISCONTINUED | OUTPATIENT
Start: 2020-03-02 | End: 2020-03-05 | Stop reason: HOSPADM

## 2020-03-02 RX ORDER — VALPROIC ACID 250 MG/1
500 CAPSULE, LIQUID FILLED ORAL EVERY MORNING
Status: DISCONTINUED | OUTPATIENT
Start: 2020-03-02 | End: 2020-03-05 | Stop reason: HOSPADM

## 2020-03-02 RX ORDER — IOPAMIDOL 755 MG/ML
120 INJECTION, SOLUTION INTRAVASCULAR ONCE
Status: COMPLETED | OUTPATIENT
Start: 2020-03-02 | End: 2020-03-02

## 2020-03-02 RX ORDER — SODIUM CHLORIDE 9 MG/ML
1000 INJECTION, SOLUTION INTRAVENOUS CONTINUOUS
Status: DISCONTINUED | OUTPATIENT
Start: 2020-03-02 | End: 2020-03-02

## 2020-03-02 RX ORDER — ONDANSETRON 4 MG/1
4 TABLET, ORALLY DISINTEGRATING ORAL EVERY 6 HOURS PRN
Status: DISCONTINUED | OUTPATIENT
Start: 2020-03-02 | End: 2020-03-05 | Stop reason: HOSPADM

## 2020-03-02 RX ORDER — NALOXONE HYDROCHLORIDE 0.4 MG/ML
.1-.4 INJECTION, SOLUTION INTRAMUSCULAR; INTRAVENOUS; SUBCUTANEOUS
Status: DISCONTINUED | OUTPATIENT
Start: 2020-03-02 | End: 2020-03-05 | Stop reason: HOSPADM

## 2020-03-02 RX ORDER — OSELTAMIVIR PHOSPHATE 75 MG/1
75 CAPSULE ORAL 2 TIMES DAILY
Status: DISCONTINUED | OUTPATIENT
Start: 2020-03-02 | End: 2020-03-05 | Stop reason: HOSPADM

## 2020-03-02 RX ORDER — SODIUM CHLORIDE 9 MG/ML
INJECTION, SOLUTION INTRAVENOUS CONTINUOUS
Status: DISCONTINUED | OUTPATIENT
Start: 2020-03-02 | End: 2020-03-05

## 2020-03-02 RX ORDER — AMOXICILLIN 250 MG
1 CAPSULE ORAL 2 TIMES DAILY
Status: DISCONTINUED | OUTPATIENT
Start: 2020-03-02 | End: 2020-03-05 | Stop reason: HOSPADM

## 2020-03-02 RX ORDER — BISACODYL 10 MG
10 SUPPOSITORY, RECTAL RECTAL DAILY PRN
Status: DISCONTINUED | OUTPATIENT
Start: 2020-03-02 | End: 2020-03-05 | Stop reason: HOSPADM

## 2020-03-02 RX ORDER — POTASSIUM CHLORIDE 7.45 MG/ML
10 INJECTION INTRAVENOUS
Status: DISCONTINUED | OUTPATIENT
Start: 2020-03-02 | End: 2020-03-05 | Stop reason: HOSPADM

## 2020-03-02 RX ORDER — POTASSIUM CHLORIDE 1500 MG/1
20-40 TABLET, EXTENDED RELEASE ORAL
Status: DISCONTINUED | OUTPATIENT
Start: 2020-03-02 | End: 2020-03-05 | Stop reason: HOSPADM

## 2020-03-02 RX ORDER — GLYCOPYRROLATE 1 MG/1
1 TABLET ORAL 3 TIMES DAILY
Status: DISCONTINUED | OUTPATIENT
Start: 2020-03-02 | End: 2020-03-05 | Stop reason: HOSPADM

## 2020-03-02 RX ORDER — ONDANSETRON 2 MG/ML
4 INJECTION INTRAMUSCULAR; INTRAVENOUS EVERY 6 HOURS PRN
Status: DISCONTINUED | OUTPATIENT
Start: 2020-03-02 | End: 2020-03-05 | Stop reason: HOSPADM

## 2020-03-02 RX ORDER — FINASTERIDE 5 MG/1
5 TABLET, FILM COATED ORAL DAILY
COMMUNITY

## 2020-03-02 RX ORDER — ZONISAMIDE 100 MG/1
300 CAPSULE ORAL EVERY MORNING
Status: DISCONTINUED | OUTPATIENT
Start: 2020-03-02 | End: 2020-03-05 | Stop reason: HOSPADM

## 2020-03-02 RX ORDER — CALCIUM CARBONATE 500 MG/1
500 TABLET, CHEWABLE ORAL 2 TIMES DAILY
Status: DISCONTINUED | OUTPATIENT
Start: 2020-03-02 | End: 2020-03-05 | Stop reason: HOSPADM

## 2020-03-02 RX ORDER — LORAZEPAM 2 MG/ML
2 INJECTION INTRAMUSCULAR
Status: DISCONTINUED | OUTPATIENT
Start: 2020-03-02 | End: 2020-03-05 | Stop reason: HOSPADM

## 2020-03-02 RX ADMIN — VALPROIC ACID 1000 MG: 250 CAPSULE, LIQUID FILLED ORAL at 21:21

## 2020-03-02 RX ADMIN — ZONISAMIDE 300 MG: 100 CAPSULE ORAL at 15:39

## 2020-03-02 RX ADMIN — SODIUM CHLORIDE: 9 INJECTION, SOLUTION INTRAVENOUS at 18:04

## 2020-03-02 RX ADMIN — APIXABAN 2.5 MG: 2.5 TABLET, FILM COATED ORAL at 15:38

## 2020-03-02 RX ADMIN — SODIUM CHLORIDE 100 ML: 9 INJECTION, SOLUTION INTRAVENOUS at 05:06

## 2020-03-02 RX ADMIN — SODIUM CHLORIDE 1000 ML: 9 INJECTION, SOLUTION INTRAVENOUS at 06:09

## 2020-03-02 RX ADMIN — APIXABAN 2.5 MG: 2.5 TABLET, FILM COATED ORAL at 21:21

## 2020-03-02 RX ADMIN — SENNOSIDES AND DOCUSATE SODIUM 1 TABLET: 8.6; 5 TABLET ORAL at 21:21

## 2020-03-02 RX ADMIN — OSELTAMIVIR PHOSPHATE 75 MG: 75 CAPSULE ORAL at 21:21

## 2020-03-02 RX ADMIN — PERAMPANEL 4 MG: 4 TABLET ORAL at 23:32

## 2020-03-02 RX ADMIN — VALPROIC ACID 500 MG: 250 CAPSULE, LIQUID FILLED ORAL at 15:38

## 2020-03-02 RX ADMIN — SODIUM CHLORIDE 1000 ML: 9 INJECTION, SOLUTION INTRAVENOUS at 06:45

## 2020-03-02 RX ADMIN — FINASTERIDE 5 MG: 5 TABLET, FILM COATED ORAL at 15:39

## 2020-03-02 RX ADMIN — IOPAMIDOL 120 ML: 755 INJECTION, SOLUTION INTRAVENOUS at 05:06

## 2020-03-02 RX ADMIN — CALCIUM CARBONATE (ANTACID) CHEW TAB 500 MG 500 MG: 500 CHEW TAB at 21:21

## 2020-03-02 RX ADMIN — GLYCOPYRROLATE 1 MG: 1 TABLET ORAL at 15:39

## 2020-03-02 RX ADMIN — SIMVASTATIN 10 MG: 10 TABLET, FILM COATED ORAL at 21:21

## 2020-03-02 RX ADMIN — GLYCOPYRROLATE 1 MG: 1 TABLET ORAL at 21:21

## 2020-03-02 ASSESSMENT — ACTIVITIES OF DAILY LIVING (ADL)
ADLS_ACUITY_SCORE: 34
ADLS_ACUITY_SCORE: 33
ADLS_ACUITY_SCORE: 33

## 2020-03-02 NOTE — PLAN OF CARE
"    Discharge Planner SLP   Patient plan for discharge: Did not discuss  Current status: A bedside swallow evaluation was completed per RN request.  Pt presents with mod-severe oral-pharyngeal dysphagia and appears close to baseline.    [Video swallow study SLP notes from 1/17/20: Discussed high risk of aspiration with all PO intake. Pt has a history of PEG tube/trach in 2010 following CVA, however, has been eating a regular diet/thin liquids since 2011. Pt stated that he does not wish to have \"more tubes\", however, unsure level of comprehension as he diverted to different topics. Pt in agreement to discuss with his brother. Called brother and education provided on VFSS results. Brother verbalized agreement that pt would not want a feeding tube, however, deferred to pt wishes. Due to high risk of aspiration with all PO, would recommend NPO with consideration of alternative feeding. Defer further discussion to MD. If pt would like to continue eating and drinking understanding the risk of aspiration, least hazardous diet would be nectar thick by spoon and puree solids with 1:1 supervision to take small bites and sip, slow rate, alternate solids and liquids, double swallows. MD paged.  Diet at discharge in 1/20 was a mechanical soft and thin liquids per pt/brother request for quality of life wishes despite aspiration risk.]      Pt tolerated puree and nectar thick by spoon with mod-max assist without overt signs of aspiration today during the evaluation.  Pt continues to be a general aspiration risk based on findings from 1/20 video swallow study.  Pt was not able to fully attend to conversation about his wishes for po intake.  Pt's brother was contacted who indicated a desire for pt to return to his mechanical soft diet with thin liquids despite aspiration risk given no new respiratory concerns.    Will defer to MD/family regarding discussion on POC and diet order.  MD was paged regarding above.  Recommend meds " crushed with nectar thick or puree until further MD/family discussion.  RN education provided. No further swallow Tx is indicated at this time.    Barriers to return to prior living situation: No SLP barriers  Recommendations for discharge: Return to LTC  Rationale for recommendations: No new SLP needs; pt at or close to baseline level of dysphagia with known silent aspiration risk; pt/family have expressed wishes for continued po despite aspiration risk       Entered by: Massiel Haney 03/02/2020 2:36 PM

## 2020-03-02 NOTE — PROGRESS NOTES
Brief neuro note:    I reviewed the chart, eeg, and examined the patient today.    EEG shows no seizures, rare sharp waves R hemisphere.  Patient is awake, following commands, dysarthric (?baseline?), knows he is at [facility].      Influenza A positive.  Ammonia normal, lactic acid normal, cmp unremarkable.  VPA level 76. Zonisamide level in process.  UA positive.    Encephalopathy.  Likely due to UTI, Influenza.  Treatment as per primary service.  I will recheck the patient tomorrow.    Ashley Cates MD  [facility] Neurology

## 2020-03-02 NOTE — PROGRESS NOTES
"Called nurse at at [facility]. Per report of the nurse pt \"does not have a facial droop, moves his arms freely, but is wheelchair bound at baseline. He is full code status and his speech is usually clear and logical. He responds in an appropriate amount of time. He tolerated a mechanical soft diet\". According to her \"he typically does not exhibit a facial droop of any major deficits from his previous stroke\".   "

## 2020-03-02 NOTE — PROGRESS NOTES
RECEIVING UNIT ED HANDOFF REVIEW    ED Nurse Handoff Report was reviewed by: Ashley Kim RN on March 2, 2020 at 7:42 AM

## 2020-03-02 NOTE — ED TRIAGE NOTES
Patient here with altered mental changes.patient was found at 0330 this morning by staff with decrease LOC,coughing and drooling

## 2020-03-02 NOTE — ED NOTES
Patient returned from CT more alert and following direction. He is aware of the month and aware of the things we were doing.

## 2020-03-02 NOTE — PHARMACY-ADMISSION MEDICATION HISTORY
Pharmacy Medication History  Admission medication history interview status for the 3/2/2020  admission is complete. See EPIC admission navigator for prior to admission medications     Medication history sources: Nursing home  Medication history source reliability: Good  Adherence assessment: Good    Medication reconciliation completed by provider prior to medication history? Yes , no changes needed.    Time spent in this activity: 20 minutes      Prior to Admission medications    Medication Sig Last Dose Taking? Auth Provider   acetaminophen (TYLENOL) 325 MG tablet Take 650 mg by mouth every 6 hours as needed for mild pain  Yes Unknown, Entered By History   apixaban ANTICOAGULANT (ELIQUIS) 2.5 MG tablet Take 2.5 mg by mouth 2 times daily 3/1/2020 at Unknown time Yes Unknown, Entered By History   calcium carbonate (TUMS) 500 MG chewable tablet Take 1 chew tab by mouth 2 times daily 3/1/2020 at Unknown time Yes Unknown, Entered By History   finasteride (PROSCAR) 5 MG tablet Take 5 mg by mouth daily 3/1/2020 at Unknown time Yes Unknown, Entered By History   glycopyrrolate (ROBINUL) 1 MG tablet Take 1 mg by mouth 3 times daily 3/1/2020 at Unknown time Yes Reported, Patient   multivitamin w/minerals (MULTI-VITAMIN) tablet Take 1 tablet by mouth daily 3/1/2020 at Unknown time Yes Unknown, Entered By History   perampanel (FYCOMPA) 4 MG tablet Take 1 tablet (4 mg) by mouth At Bedtime 3/1/2020 at Unknown time Yes Ryan Reyes PA-C   polyethylene glycol (MIRALAX) powder Take 17 g by mouth every morning Mix in 8 oz of liquid. 3/1/2020 at Unknown time Yes Reported, Patient   senna-docusate (SENOKOT-S/PERICOLACE) 8.6-50 MG tablet Take 1 tablet by mouth 2 times daily 3/1/2020 at Unknown time Yes Unknown, Entered By History   simvastatin (ZOCOR) 10 MG tablet Take 10 mg by mouth At Bedtime 3/1/2020 at Unknown time Yes Unknown, Entered By History   valproic acid (DEPAKENE) 250 MG capsule Take 500 mg by mouth every  morning 3/1/2020 at Unknown time Yes Unknown, Entered By History   valproic acid (DEPAKENE) 250 MG capsule Take 1,000 mg by mouth At Bedtime 3/1/2020 at Unknown time Yes Unknown, Entered By History   zonisamide (ZONEGRAN) 100 MG capsule Take 300 mg by mouth every morning 3/1/2020 at Unknown time Yes Unknown, Entered By History

## 2020-03-02 NOTE — ED NOTES
Bed: [facility]  Expected date:   Expected time:   Means of arrival:   Comments:  E2  74M Decreased mental status/fever/infection  0434

## 2020-03-02 NOTE — H&P
[facility]    History and Physical - Hospitalist Service       Date of Admission:  3/2/2020    Assessment & Plan   Josiah Toney is a 75 year old male admitted on 3/2/2020.     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      AMS:  Resides at a nursing home and staff noticed patient was less responsive, change in speech and drooling.  Code stroke initiated.  Head CT and Head/Neck CTA negative for acute CVA but noted previous right MCA territory. Notably low grade fever with recent cough and history of urinary retention with UTI. Lactic WNL 0.6, Ammonia WNL 24.  Chest Xray negative.  EKG with sinus rhythm.    Differential includes seizure versus toxic encephalopathy.    -Blood culture (no growth to date).   -UA (positive nitrites, small LE, WBC of 15 cath specimen).  Ucx in process.    --General Neurology consult.    --EEG.     --Q 4 Neurochecks.    --Seizure precautions.    --PT/OT/SLP.    --Social work.    --Spoke with patient's brother (Jeff) appears he was not at baseline yesterday 3/1.  Brother would like to be updated daily.      Recurrent UTI:  Previous admission here at [facility] 1/15/2020-1/19/2020 for AMS secondary to UTI. At that time culture grew out many organisms (100K Klebsiella, Proteus, and Enterococcus, all sensitive to Augmentin)  --UA (positive nitrites, small LE, WBC of 15 cath specimen).  Ucx in process.     Incidental thyroid nodules:  Noted on CT scans. TSH WNL at 3.04.    --Outpatient thyroid US.         Cough with low grade fever:  Patient with a cough that he stated has been present  for a month.  Noted low grade temp in the ED.  After discussion with patient's brother , he did not notice the patient coughing yesterday.   -Noted below patient with known history of speech articulation D/O with dysarthria and risk for aspiration with eating.    -Chest Xray (1 view) negative.  --Monitor for fever.    --Check Influenza PCR.    --Droplet/Contact isolation.    --Will await Influenza results before starting medications, unless patient begins to spike fevers.      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  Valproic acid level of 76.   --Follow Zonisamide level.     --General Neurology consult.    --EEG.    --Resume PTA Depakene 500 mg every morning and 1000 mg at bedtime.    --Resume PTA Fycompa 4 mg at bedtime.    --Resume PTA Zonegran 300 mg every morning.      Thrombocytopenia:  PLT low at 113.    --Recheck labs in the morning.      BPH with history of urinary retention s/p cystolitholapaxy of bladder and suprapubic cystotomy with tube placement (1/2017):  Follows with Urology at [facility].    --Resume PTA Proscar 5 mg daily.      HTN:  Not currently on medications.      HLP:  --Resume PTA simvastatin 10 mg at bedtime.      History of DVT:  --Resume PTA Eliquis 2.5 mg BID.      Cerebral palsy:  Appears stable.      MDD:  Does not appear to be on any medications.      Recurrent nephrolithiasis:  Has required lithotripsy in the past.      Chronic sialadenitis:  --Resume PTA Robinul 1 mg TID.      Chronic memory/cognitive impairment:  Unclear what patient's baseline mentation is.    --Monitor.    --OT.      Chronic anemia:  Baseline hemoglobin between 11-12.  Stable.    --Monitor.       History of right MCA CVA with chronic left sided spastic hemiparesis (~10 years ago):   --Stroke work-up negative in the ED and Code Stroke de-escaleted.    --PT/OT/SLP.      Speech articulation D/O:  Last admission Speech recommended NPO with feeding tube.  After  discussion with patient's brother (Jeff) it was indicated that they would likely take the risk of aspiration and allow patient to eat.    Chest Xray negative.   --NPO.    --SLP.    --At the nursing home on a mechanical soft diet with regular liquids.       Diet: NPO  DVT Prophylaxis: Eliquis  Shearer Catheter: in place, indication:    Code Status: Full Code while altered    Disposition Plan   Expected discharge: 2 - 3 days, recommended to await recommendations from PT/OT once mentaiton returns to baseline, Neurology work-up completed and medications adjusted if needed.  Entered: Ryan Reyes PA-C 03/02/2020, 6:48 AM     The patient's care was discussed with the Bedside Nurse and Patient.    The patient has been discussed with Dr. Casey, who agrees with the assessment and plan at this time. Dr. Casey will evaluate the patient independently.       Ryan Reyes PA-C  [facility]    ______________________________________________________________________    Chief Complaint   Altered Mental Status    History is obtained from the patient and EMR.      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         911 called by nursing home staff as patient was less responsive, had change in speech and an increase in drooling.  EMS reported patient was unable to follow commands.  Nursing home staff also mention he had developed a recent cough and felt warm to the touch, blood glucose was 86.      Dr. Chan evaluated the patient in the ED.  Code stroke was called and   [facility] was contacted.  Head CT and Head/Neck CTA negative for acute CVA but noted previous right MCA territory. Notably low grade fever with recent cough and history of urinary retention with UTI. Lactic WNL 0.6, Ammonia WNL 24.  Chest Xray negative.  EKG with sinus rhythm.  Blood culture taken and no growth so far.  BMP with a chloride of 11, calcium of 7.8 and albumin of 2.3 otherwise within normal limits.  CBC with differential with a WBC of 5.8, HGB of 11.7, hematocrit of 36.8, PLT of 113, RBC count of 3.7, RDW of 15.1 otherwise within normal limits.  Valproic acid level of 76 (therapeutic).  Zonisamide level pending.      Patient was seen in his hospital room with bedside nurse present.  Patient is slow to respond and becomes distracted very easily.  He is able to state he is here in the hospital in [place] and that it is March.  He otherwise seems to be unable to maintain on topic.  Unfortunately, am unsure what patient's mentation is at baseline.    Patient stated he believes he had a seizure.  He currently has a headache.  He describes being cold and then asks for several different blankets of different colors.  He becomes fixated on this and is difficult to redirect.  He was able to state he has had a cough for a couple of weeks but is not productive.  He has ongoing abdominal pain where his suprapubic catheter is and also complains of constipation.  He is wheelchair-bound and states that his bottom is sore and he also has sore feet.  When asked about vision changes he states he is missing his glasses and then is focused on finding his glasses.    Review of Systems    The 10 point Review of Systems is negative other than noted in the HPI.    Past Medical History    I have reviewed this patient's medical history and updated it with pertinent information if needed.   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  Past Surgical History:   Procedure Laterality Date     ------------OTHER-------------      Cystolithopaxy and suprapubic cystotomy with catheter placement     LITHOTRIPSY     Tonsillectomy at age 5    Social History   I have reviewed this patient's social history and updated it with pertinent information if needed.  Social History     Tobacco Use     Smoking status: Never Smoker     Smokeless tobacco: Never Used   Substance Use Topics     Alcohol use: Not Currently     Drug use: Not on file   Patient is a non-smoker.  Denied any recent use or history of alcohol use.  He denied illicit drug use.  He is wheelchair bound.      Family History   I have reviewed this patient's family history and updated it with pertinent information if needed.   No family history on file.   Mother:  from Cancer.      Prior to Admission Medications   Prior to Admission Medications   Prescriptions Last Dose Informant Patient Reported? Taking?   Finasteride (PROSCAR PO)  Nursing Home Yes No   Sig: Take 5 mg by mouth daily   acetaminophen (TYLENOL) 325 MG tablet  Nursing Home Yes No   Sig: Take 650 mg by mouth every 6 hours as needed for mild pain   amoxicillin-clavulanate (AUGMENTIN) 400-57 MG/5ML suspension   No No   Sig: Take 9.4 mLs (750 mg) by  mouth 2 times daily for 7 days   apixaban ANTICOAGULANT (ELIQUIS) 2.5 MG tablet  Nursing Home Yes No   Sig: Take 2.5 mg by mouth 2 times daily   calcium carbonate (TUMS) 500 MG chewable tablet  Nursing Home Yes No   Sig: Take 1 chew tab by mouth 2 times daily   glycopyrrolate (ROBINUL) 1 MG tablet  Nursing Home Yes No   Sig: Take 1 mg by mouth 3 times daily   multivitamin w/minerals (MULTI-VITAMIN) tablet  Nursing Home Yes No   Sig: Take 1 tablet by mouth daily   perampanel (FYCOMPA) 4 MG tablet   No No   Sig: Take 1 tablet (4 mg) by mouth At Bedtime   polyethylene glycol (MIRALAX) powder  Nursing Home Yes No   Sig: Take 17 g by mouth every morning Mix in 8 oz of liquid.   senna-docusate (SENOKOT-S/PERICOLACE) 8.6-50 MG tablet  Nursing Home Yes No   Sig: Take 1 tablet by mouth 2 times daily   simvastatin (ZOCOR) 10 MG tablet  Nursing Home Yes No   Sig: Take 10 mg by mouth At Bedtime   valproic acid (DEPAKENE) 250 MG capsule  Nursing Home Yes No   Sig: Take 500 mg by mouth every morning   valproic acid (DEPAKENE) 250 MG capsule  Nursing Home Yes No   Sig: Take 1,000 mg by mouth At Bedtime   zonisamide (ZONEGRAN) 100 MG capsule  Nursing Home Yes No   Sig: Take 300 mg by mouth every morning      Facility-Administered Medications: None     Allergies   No Known Allergies    Physical Exam   Vital Signs: Temp: 100.6  F (38.1  C) Temp src: Rectal BP: 92/52 Pulse: 62 Heart Rate: 60 Resp: 14 SpO2: 96 % O2 Device: Nasal cannula Oxygen Delivery: 2 LPM  Weight: 154 lbs 1.62 oz      Constitutional: Awake, alert, partially cooperative, no apparent distress.    ENT: Normocephalic, without obvious abnormality, atraumatic, oral pharynx with moist mucus membranes, tonsils without erythema or exudates.  Eyes difficult to assess as he kept looking away or shutting them.    Neck: Supple, symmetrical, trachea midline, no adenopathy.  Pulmonary: No increased work of breathing, good air exchange, clear to auscultation bilaterally, no  crackles or wheezing.  Cardiovascular: Regular rate and rhythm, normal S1 and S2, no S3 or S4, and no murmur noted.  GI: Normal bowel sounds, soft, non-distended, non-tender.    Skin/Integumen: Clear.  Neuro: CN II-XII grossly intact except left sided facial droop.  Patient with slowed speech and easily distracted and unable to stay on topic.  Chronic left sided spastic hemiparesis.     Psych:  Alert and oriented x 2. Flat affect.    Extremities: No lower extremity edema noted, and calves are non-tender to palpation bilaterally.    : Suprapubic catheter in place with clear yellow urine in the bag.        Data   Data reviewed today: I reviewed all medications, new labs and imaging results over the last 24 hours. I personally reviewed no images or EKG's today.    Recent Labs   Lab 03/02/20  0447   WBC 5.8   HGB 11.7*      *      POTASSIUM 3.7   CHLORIDE 111*   CO2 23   BUN 20   CR 0.76   ANIONGAP 5   BREANN 7.8*   GLC 96   ALBUMIN 2.3*   PROTTOTAL 6.9   BILITOTAL 0.2   ALKPHOS 58   ALT 10   AST 16   TROPI <0.015     Recent Results (from the past 24 hour(s))   CT Head w/o Contrast    Narrative    EXAM: CTA  HEAD NECK WITH CONTRAST, CT HEAD PERFUSION WITH CONTRAST, CT HEAD W/O CONTRAST  LOCATION: [facility]  DATE/TIME: 3/2/2020 4:50 AM    INDICATION: Right facial droop  COMPARISON: CT head 01/15/2020  TECHNIQUE: Head and neck CT angiogram with IV contrast. Noncontrast head CT followed by axial helical CT images of the head and neck vessels obtained during the arterial phase of intravenous contrast administration. Axial 2D reconstructed images and   multiplanar 3D MIP reconstructed images of the head and neck vessels were performed by the technologist. Additional CT cerebral perfusion was performed utilizing a second contrast bolus. Perfusion data were post processed with generation of standard   perfusion maps and estimation of ischemic/infarcted volumes utilizing standard threshold  values. Dose reduction techniques were used.  All stenosis measurements made according to NASCET criteria unless otherwise specified.  CONTRAST: 70mL Isovue-370 (accession [id number]), 50mL Isovue-370 (accession [id number])  COMPARISON: None.    FINDINGS:   NONCONTRAST HEAD CT:   INTRACRANIAL CONTENTS: No intracranial hemorrhage, extraaxial collection, or mass effect.  No CT evidence of acute infarct. Mild to moderate presumed chronic small vessel ischemic changes. Mild to moderate generalized volume loss. No hydrocephalus.     Right MCA territory remote infarct involving predominantly the right basal ganglia, right insula, right temporal lobe.      HEAD CTA:  No intracranial arterial large vessel occlusion.    ANTERIOR CIRCULATION: No significant stenosis/occlusion, aneurysm, or high flow vascular malformation. Developmentally hypoplastic right A1 anterior cerebral artery segment.    POSTERIOR CIRCULATION: No significant stenosis/occlusion, aneurysm, or high flow vascular malformation. Balanced vertebral arteries supply a normal basilar artery.       NECK CTA:  RIGHT CAROTID: No significant stenosis or dissection.    LEFT CAROTID: No significant stenosis or dissection.    VERTEBRAL ARTERIES: No significant stenosis or dissection. Balanced vertebral arteries.    AORTIC ARCH: Classic aortic arch anatomy with no significant stenosis at the origin of the great vessels.    ARTERIAL PLAQUE:   Mild calcified plaque left vertebral artery origin.   Bilateral carotid siphons demonstrate calcified plaque.   Right carotid bulb mild noncalcified plaque.    NONVASCULAR STRUCTURES:     Left inferior axilla sinus mild to moderate polypoidal mucosal thickening. Right inferior maxillary sinus mild polypoidal mucosal thickening.    Conus medullaris.    Degenerative changes noted within the spine.      C1 transverse ligament calcification suggesting crystal deposition arthropathy.    Scattered foci air within the venous system  nonspecific likely iatrogenic.    Bilateral thyroid lobe nodules largest in the right thyroid lobe measuring 2.4 cm. Recommend nonemergent thyroid ultrasound based upon ACR white paper criteria.      Right lung apex small postinflammatory calcified granuloma.    Dental amalgam resulting in streak artifact.      CT PERFUSION:  PERFUSION MAPS:   Increase Tmax right temporal lobe in region of remote infarct.    No acute perfusion deficits identified.    RAPID ANALYSIS:  CBF<30%: 0 mL  Tmax>6sec: 9 mL in region of right temporal lobe  Mismatch volume: 9 mL  Mismatch ratio: Not applicable        Impression    IMPRESSION:   HEAD CT:  1.  Aspect score 10.  2.  No acute intracranial hemorrhage.  3.  Right MCA territory remote infarct as described above.  4.  Age-related changes described above.     Dr Galina Chan  was notified by Dr Gurjit Alicia at 5:10 AM 03/02/2020.    HEAD CTA:   1.  No intracranial arterial large vessel occlusion.  2.  Proximal intracranial arteries demonstrate no significant stenosis or occlusion.     NECK CTA:  1.  Cervical arteries demonstrate no significant stenosis or occlusion.   2.  Thyroid nodules as described above. Recommend nonemergent thyroid ultrasound based upon ACR white paper criteria.   3.  Additional findings above.      CT PERFUSION:  No acute perfusion deficits identified.    Dr Galina Chan was notified by Dr Gurjit Alicia at 5:45 AM 03/02/2020.   CTA Head Neck with Contrast    Narrative    EXAM: CTA  HEAD NECK WITH CONTRAST, CT HEAD PERFUSION WITH CONTRAST, CT HEAD W/O CONTRAST  LOCATION: [facility]  DATE/TIME: 3/2/2020 4:50 AM    INDICATION: Right facial droop  COMPARISON: CT head 01/15/2020  TECHNIQUE: Head and neck CT angiogram with IV contrast. Noncontrast head CT followed by axial helical CT images of the head and neck vessels obtained during the arterial phase of intravenous contrast administration. Axial 2D reconstructed images and   multiplanar 3D MIP  reconstructed images of the head and neck vessels were performed by the technologist. Additional CT cerebral perfusion was performed utilizing a second contrast bolus. Perfusion data were post processed with generation of standard   perfusion maps and estimation of ischemic/infarcted volumes utilizing standard threshold values. Dose reduction techniques were used.  All stenosis measurements made according to NASCET criteria unless otherwise specified.  CONTRAST: 70mL Isovue-370 (accession [id number]), 50mL Isovue-370 (accession [id number])  COMPARISON: None.    FINDINGS:   NONCONTRAST HEAD CT:   INTRACRANIAL CONTENTS: No intracranial hemorrhage, extraaxial collection, or mass effect.  No CT evidence of acute infarct. Mild to moderate presumed chronic small vessel ischemic changes. Mild to moderate generalized volume loss. No hydrocephalus.     Right MCA territory remote infarct involving predominantly the right basal ganglia, right insula, right temporal lobe.      HEAD CTA:  No intracranial arterial large vessel occlusion.    ANTERIOR CIRCULATION: No significant stenosis/occlusion, aneurysm, or high flow vascular malformation. Developmentally hypoplastic right A1 anterior cerebral artery segment.    POSTERIOR CIRCULATION: No significant stenosis/occlusion, aneurysm, or high flow vascular malformation. Balanced vertebral arteries supply a normal basilar artery.       NECK CTA:  RIGHT CAROTID: No significant stenosis or dissection.    LEFT CAROTID: No significant stenosis or dissection.    VERTEBRAL ARTERIES: No significant stenosis or dissection. Balanced vertebral arteries.    AORTIC ARCH: Classic aortic arch anatomy with no significant stenosis at the origin of the great vessels.    ARTERIAL PLAQUE:   Mild calcified plaque left vertebral artery origin.   Bilateral carotid siphons demonstrate calcified plaque.   Right carotid bulb mild noncalcified plaque.    NONVASCULAR STRUCTURES:     Left inferior axilla sinus  mild to moderate polypoidal mucosal thickening. Right inferior maxillary sinus mild polypoidal mucosal thickening.    Conus medullaris.    Degenerative changes noted within the spine.      C1 transverse ligament calcification suggesting crystal deposition arthropathy.    Scattered foci air within the venous system nonspecific likely iatrogenic.    Bilateral thyroid lobe nodules largest in the right thyroid lobe measuring 2.4 cm. Recommend nonemergent thyroid ultrasound based upon ACR white paper criteria.      Right lung apex small postinflammatory calcified granuloma.    Dental amalgam resulting in streak artifact.      CT PERFUSION:  PERFUSION MAPS:   Increase Tmax right temporal lobe in region of remote infarct.    No acute perfusion deficits identified.    RAPID ANALYSIS:  CBF<30%: 0 mL  Tmax>6sec: 9 mL in region of right temporal lobe  Mismatch volume: 9 mL  Mismatch ratio: Not applicable        Impression    IMPRESSION:   HEAD CT:  1.  Aspect score 10.  2.  No acute intracranial hemorrhage.  3.  Right MCA territory remote infarct as described above.  4.  Age-related changes described above.     Dr Galina Chan  was notified by Dr Gurjit Alicia at 5:10 AM 03/02/2020.    HEAD CTA:   1.  No intracranial arterial large vessel occlusion.  2.  Proximal intracranial arteries demonstrate no significant stenosis or occlusion.     NECK CTA:  1.  Cervical arteries demonstrate no significant stenosis or occlusion.   2.  Thyroid nodules as described above. Recommend nonemergent thyroid ultrasound based upon ACR white paper criteria.   3.  Additional findings above.      CT PERFUSION:  No acute perfusion deficits identified.    Dr Galina Chan was notified by Dr Gurjit Alicia at 5:45 AM 03/02/2020.   CT Head Perfusion w Contrast    Narrative    EXAM: CTA  HEAD NECK WITH CONTRAST, CT HEAD PERFUSION WITH CONTRAST, CT HEAD W/O CONTRAST  LOCATION: [facility]  DATE/TIME: 3/2/2020 4:50 AM    INDICATION: Right facial  droop  COMPARISON: CT head 01/15/2020  TECHNIQUE: Head and neck CT angiogram with IV contrast. Noncontrast head CT followed by axial helical CT images of the head and neck vessels obtained during the arterial phase of intravenous contrast administration. Axial 2D reconstructed images and   multiplanar 3D MIP reconstructed images of the head and neck vessels were performed by the technologist. Additional CT cerebral perfusion was performed utilizing a second contrast bolus. Perfusion data were post processed with generation of standard   perfusion maps and estimation of ischemic/infarcted volumes utilizing standard threshold values. Dose reduction techniques were used.  All stenosis measurements made according to NASCET criteria unless otherwise specified.  CONTRAST: 70mL Isovue-370 (accession [id number]), 50mL Isovue-370 (accession [id number])  COMPARISON: None.    FINDINGS:   NONCONTRAST HEAD CT:   INTRACRANIAL CONTENTS: No intracranial hemorrhage, extraaxial collection, or mass effect.  No CT evidence of acute infarct. Mild to moderate presumed chronic small vessel ischemic changes. Mild to moderate generalized volume loss. No hydrocephalus.     Right MCA territory remote infarct involving predominantly the right basal ganglia, right insula, right temporal lobe.      HEAD CTA:  No intracranial arterial large vessel occlusion.    ANTERIOR CIRCULATION: No significant stenosis/occlusion, aneurysm, or high flow vascular malformation. Developmentally hypoplastic right A1 anterior cerebral artery segment.    POSTERIOR CIRCULATION: No significant stenosis/occlusion, aneurysm, or high flow vascular malformation. Balanced vertebral arteries supply a normal basilar artery.       NECK CTA:  RIGHT CAROTID: No significant stenosis or dissection.    LEFT CAROTID: No significant stenosis or dissection.    VERTEBRAL ARTERIES: No significant stenosis or dissection. Balanced vertebral arteries.    AORTIC ARCH: Classic aortic arch  anatomy with no significant stenosis at the origin of the great vessels.    ARTERIAL PLAQUE:   Mild calcified plaque left vertebral artery origin.   Bilateral carotid siphons demonstrate calcified plaque.   Right carotid bulb mild noncalcified plaque.    NONVASCULAR STRUCTURES:     Left inferior axilla sinus mild to moderate polypoidal mucosal thickening. Right inferior maxillary sinus mild polypoidal mucosal thickening.    Conus medullaris.    Degenerative changes noted within the spine.      C1 transverse ligament calcification suggesting crystal deposition arthropathy.    Scattered foci air within the venous system nonspecific likely iatrogenic.    Bilateral thyroid lobe nodules largest in the right thyroid lobe measuring 2.4 cm. Recommend nonemergent thyroid ultrasound based upon ACR white paper criteria.      Right lung apex small postinflammatory calcified granuloma.    Dental amalgam resulting in streak artifact.      CT PERFUSION:  PERFUSION MAPS:   Increase Tmax right temporal lobe in region of remote infarct.    No acute perfusion deficits identified.    RAPID ANALYSIS:  CBF<30%: 0 mL  Tmax>6sec: 9 mL in region of right temporal lobe  Mismatch volume: 9 mL  Mismatch ratio: Not applicable        Impression    IMPRESSION:   HEAD CT:  1.  Aspect score 10.  2.  No acute intracranial hemorrhage.  3.  Right MCA territory remote infarct as described above.  4.  Age-related changes described above.     Dr Galina Chan  was notified by Dr Gurjit Alicia at 5:10 AM 03/02/2020.    HEAD CTA:   1.  No intracranial arterial large vessel occlusion.  2.  Proximal intracranial arteries demonstrate no significant stenosis or occlusion.     NECK CTA:  1.  Cervical arteries demonstrate no significant stenosis or occlusion.   2.  Thyroid nodules as described above. Recommend nonemergent thyroid ultrasound based upon ACR white paper criteria.   3.  Additional findings above.      CT PERFUSION:  No acute perfusion deficits  identified.    Dr Galina Chan was notified by Dr Gurjit Alicia at 5:45 AM 03/02/2020.   XR Chest Port 1 View    Narrative    EXAM: XR CHEST PORT 1 VW  LOCATION: [facility]  DATE/TIME: 3/2/2020 5:13 AM    INDICATION: Fever.  COMPARISON: 01/17/2020.    FINDINGS: The heart size is normal. The lungs are clear. No pneumothorax.      Impression    IMPRESSION: No acute abnormality.

## 2020-03-02 NOTE — PROGRESS NOTES
Pt here with altered mental status. A&Ox3, disoriented to place, inattention. Neuros intact ex L facial droop, L pronator drip, BLE weakness (wheelchair bound at baseline). VSS ex congested cough. Tele NSR. NPO pending speech evaluation. Up with A2, lift. Denies pain. SP catheter with good output. Scattered bruising. L shin scab, L inner foot wound, dressing CDI. Pt scoring Green on the Aggression Stop Light Tool. Plan pending. Transferred to  for private room.

## 2020-03-02 NOTE — ED NOTES
[facility]  ED Nurse Handoff Report    ED Chief complaint: Altered Mental Status      ED Diagnosis:   Final diagnoses:   None       Code Status: DNR / DNI    Allergies: No Known Allergies    Patient Story: Josiah Elizalde is a 78 year old male arrived via EMS with altered mental status.Per EMS nursing home staff stated at 0330 patient had mental status changes, declined speech,couging and drooling. Patient was not able to follow command with EMS and was less responsive. Patient became more alert after CT and was able to stated the current month. He has HX of HTN,DVT and cerebral palsy,seizure and stroke in the past  Focused Assessment:  altered mental changes    Treatments and/or interventions provided: NS 1000 cc  Patient's response to treatments and/or interventions: .    To be done/followed up on inpatient unit:  .    Does this patient have any cognitive concerns?:     Activity level - Baseline/Home:  Total Care  Activity Level - Current:   Total Care    Patient's Preferred language: English   Needed?: No    Isolation: None  Infection: Not Applicable  Bariatric?: No    Vital Signs:   Vitals:    03/02/20 0505 03/02/20 0530 03/02/20 0545 03/02/20 0550   BP: 101/55 104/57 106/55 106/55   Pulse: 64  68    Resp: 16 17 14 12   Temp:       TempSrc:       SpO2: 98% 97% (!) 86% 94%   Weight:           Cardiac Rhythm:     Was the PSS-3 completed:   No -not at this time  What interventions are required if any?               Family Comments: none  OBS brochure/video discussed/provided to patient/family: No              Name of person given brochure if not patient: .              Relationship to patient: .    For the majority of the shift this patient's behavior was Green.   Behavioral interventions performed were none.    ED NURSE PHONE NUMBER: [phone]

## 2020-03-02 NOTE — CONSULTS
[facility]      Neurology Stroke Code    Patient Name: Josiah Toney  : 1945 MRN#: [id number]    STROKE DATA     Stroke Code:  Time called:  2020 0438  Time patient seen:  2020 0520  Onset of symptoms:  2020 0400   Last known normal (pt's baseline):  Unclear, possible 3/2/2020 @ 0300, but not entirely normal  Head CT read by me at:  2020 0500  Stroke Code de-escalated at 2020 0531 due to symptoms not likely caused by stroke. Delayed stroke de-escalation due to issues with communication (nothing serious).     TPA treatment:  Not given due to stroke mimic: encephalopathy, seizure.    National Institutes of Health Stroke Scale (at presentation)  NIHSS done at:  time patient seen      Score    Level of consciousness:  (0)   Alert, keenly responsive    LOC questions:  (0)   Answers both questions correctly    LOC commands:  (0)   Performs both tasks correctly    Best gaze:  (0)   Normal    Visual:  (0)   No visual loss    Facial palsy:  (1)   Minor paralysis (flat nasolabial fold, smile asymmetry)    Motor arm (left):  (2)   Some effort against gravity    Motor arm (right):  (0)   No drift    Motor leg (left):  (3)   No effort against gravity    Motor leg (right):  (3)   No effort against gravity    Limb ataxia:  (0)   Absent    Sensory:  (0)   Normal- no sensory loss    Best language:  (0)   Normal- no aphasia    Dysarthria:  (1)   Mild to moderate dysarthria    Extinction and inattention:  (0)   No abnormality        NIHSS Total Score:  10           ASSESSMENT & RECOMMENDATONS     Stroke code activated due to decreased LOC and left sided weakness that was worse than baseline. He has a prior R MCA stroke with unclear disability other than documented wheelchair bound status. He does also have a history of seizure disorder and prior UTI with resultant AMS.  This episode does not likely represent a new ischemic process.  Would pursue either breakthrough seizure  versus toxic metabolic encephalopathy with underlying infection.  He does have a suprapubic catheter at baseline which may predispose him to more infection.  He likely is colonized through that line as well.     Recommendations:   Check drug levels (Zonegran, valproic acid)-these have been drawn  Lactate check for any seizure activity that was unwitnessed  Check ammonia given that he is on valproic acid which may raise this level  Check liver function as he is on valproic acid  Continue to monitor and wait for patient to clear  No further stroke work-up indicated at this point in time.  Please call general neurology with any further questions.    Ryan Gar MD  Vascular Neurology/Neurocritical Care  Text Page - [phone]

## 2020-03-02 NOTE — PLAN OF CARE
Discharge Planner PT   Patient plan for discharge: None stated.   Current status: Orders received and chart reviewed. Per discussion with [facility], pt requires assist of 2 with use of Ez-stand at baseline.   Barriers to return to prior living situation: None indicated.  Recommendations for discharge: Return to [facility]  Rationale for recommendations: No IP PT needs indicated as pt requires mechanical lift with assist of 2 at baseline.        Entered by: Ella Candelario 03/02/2020 11:08 AM

## 2020-03-02 NOTE — ED PROVIDER NOTES
History     Chief Complaint:  Altered Mental Status    The history is provided by the EMS personnel and the nursing home. The history is limited by the condition of the patient.      Josiah Toney is a 75 year old male with a history of hypertension, DVT, cerebral palsy, cerebral infarction, hypertension, among others, anticoagulated on Eliquis, who presents with altered mental status via EMS from [facility] [place]. Per report, within the past hour the patient has had a change in mentation with becoming less responsive, speech decline, and drooling. Staff at his care facility called 911 for concerns of a stroke as at baseline he has normal mentation. They noted recent coughing and he felt warm to the touch but had a temperature of 99 F. He had a blood glucose of 86. EMS report that he was unable to follow commands initially.     Allergies:  No known drug allergies.       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 No pertinent family history.     Social History:  The patient was accompanied to the ED by EMS.  Smoking Status: Never Smoker  Smokeless Tobacco: Never Used  Alcohol Use: Negative   Drug Use: Negative  PCP: Shania Wyatt   Marital Status:  Single      Review of Systems   Unable to perform ROS: Mental status change     Physical Exam     Patient Vitals for the past 24 hrs:   BP Temp Temp src Pulse Heart Rate Resp SpO2 Weight   03/02/20 0550 106/55 -- -- -- 65 12 94 % --   03/02/20 0545 106/55 -- -- 68 70 14 (!) 86 % --   03/02/20  0530 104/57 -- -- -- 68 17 97 % --   03/02/20 0505 101/55 -- -- 64 -- 16 98 % --   03/02/20 0500 99/55 -- -- 62 -- 16 97 % --   03/02/20 0450 104/82 100.6  F (38.1  C) Rectal 67 -- 18 91 % --   03/02/20 0440 -- -- -- -- -- -- -- 69.9 kg (154 lb 1.6 oz)      Physical Exam    General: Resting on the gurney, appears uncomfortable  Head:  The scalp, face, and head appear normal  Mouth/Throat: Mucus membranes are moist  CV:  Regular rate    Normal S1 and S2  No pathological murmur   Resp:  Breath sounds clear and equal bilaterally    Non-labored, no retractions or accessory muscle use    No coarseness    No wheezing   GI:  Abdomen is soft, no rigidity.    No grimace to palpation.     No tenderness to palpation  MS:  Normal motor assessment of all extremities.    Good capillary refill noted.  Skin:  No rash or lesions noted.  Neuro: Left sided weakness.     Nearly aphasic on initial exam, improved on repeat exam.    Difficulty managing oral secretions, likely chronic by review of records.    Left sided facial droop.   Psych:  Unable to assess    Emergency Department Course     ECG:  ECG taken at 0529  Normal sinus rhythm  Nonspecific ST and T wave abnormality   Abnormal ECG  Rate 69 bpm. CT interval 148 ms. QRS duration 66 ms. QT/QTc 378/405 ms. P-R-T axes * -15 -12.    Imaging:  Radiology findings were communicated with the patient who voiced understanding of the findings.    XR Chest Port 1 View  No acute abnormality.     Reading per radiology     CT Head Perfusion w Contrast  HEAD CT:  1.  Aspect score 10.  2.  No acute intracranial hemorrhage.  3.  Right MCA territory remote infarct as described above.  4.  Age-related changes described above.     Dr Galina Chan  was notified by Dr Gurjit Alicia at 5:10 AM 03/02/2020.    HEAD CTA:   1.  No intracranial arterial large vessel occlusion.  2.  Proximal intracranial arteries demonstrate no significant stenosis or occlusion.     NECK CTA:  1.  Cervical arteries demonstrate  no significant stenosis or occlusion.   2.  Thyroid nodules as described above. Recommend nonemergent thyroid ultrasound based upon ACR white paper criteria.   3.  Additional findings above.    CT PERFUSION:  No acute perfusion deficits identified.    Dr Galina Chan was notified by Dr Gurjit Alicia at 5:45 AM 03/02/2020.  Reading per radiology    CTA Head Neck with Contrast  HEAD CT:  1.  Aspect score 10.  2.  No acute intracranial hemorrhage.  3.  Right MCA territory remote infarct as described above.  4.  Age-related changes described above.     Dr Galina Chan  was notified by Dr Gurjit Alicia at 5:10 AM 03/02/2020.    HEAD CTA:   1.  No intracranial arterial large vessel occlusion.  2.  Proximal intracranial arteries demonstrate no significant stenosis or occlusion.     NECK CTA:  1.  Cervical arteries demonstrate no significant stenosis or occlusion.   2.  Thyroid nodules as described above. Recommend nonemergent thyroid ultrasound based upon ACR white paper criteria.   3.  Additional findings above.    CT PERFUSION:  No acute perfusion deficits identified.    Dr Galina Chan was notified by Dr Gurjit Alicia at 5:45 AM 03/02/2020.  Reading per radiology    CT Head w/o Contrast  HEAD CT:  1.  Aspect score 10.  2.  No acute intracranial hemorrhage.  3.  Right MCA territory remote infarct as described above.  4.  Age-related changes described above.     Dr Galina Chan  was notified by Dr Gurjit Alicia at 5:10 AM 03/02/2020.    HEAD CTA:   1.  No intracranial arterial large vessel occlusion.  2.  Proximal intracranial arteries demonstrate no significant stenosis or occlusion.     NECK CTA:  1.  Cervical arteries demonstrate no significant stenosis or occlusion.   2.  Thyroid nodules as described above. Recommend nonemergent thyroid ultrasound based upon ACR white paper criteria.   3.  Additional findings above.    CT PERFUSION:  No acute perfusion deficits identified.    Dr Galina Chan was notified by Dr Cagle  Ravin at 5:45 AM 03/02/2020.  Reading per radiology      Laboratory:  Laboratory findings were communicated with the patient who voiced understanding of the findings.    UA: pending    CBC: WBC 5.8, HGB 11.7 (L),  (L)  CMP: Chloride 111 (H), Calcium 7.8 (L), Albumin 2.3 (L), o/w WNL (Creatinine 0.76)    Troponin (Collected 0447): <0.015     Lactic Acid (Resulted: 0546): 0.6 (L)     Ammonia: 24   Valproic Acid: 76  Zonisamide Level Quantitative: pending     Blood Culture x2: Pending     Interventions:  0609 NS 1000 mL IV     Emergency Department Course:    0434 Nursing notes and vitals reviewed. I performed an exam of the patient as documented above.     0437 Code stroke initiated.     0439 I spoke with Dr. Gar of the stroke neurology service regarding patient's presentation, findings, and plan of care.     0447 IV was inserted and blood was drawn for laboratory testing, results above.     0450 The patient was sent for a CT while in the emergency department, results above.      0504 Dr. Gar arrived in the emergency department.     0508 I spoke with Dr. Alicia of the radiology service regarding patient's presentation, findings, and plan of care.     0512 Dr. Gar is currently evaluating the patient.     0513 A portable chest xray was obtained, results above.     0515 Code stroke was deescalated.     0529 EKG obtained as noted above.     0545 I spoke with Dr. Alicia of the radiology service regarding patient's presentation, findings, and plan of care.     0546 Blood drawn. This was sent to the lab for further testing, results above.     0600 I spoke with Dr. Solorzano of the hospitalist service from [facility] regarding patient's presentation, findings, and plan of care.      Prior to admission, I personally reviewed the results with the patient and all related questions were answered. The patient verbalized understanding and is amenable to plan.     Impression & Plan      Medical Decision  Making:  Josiah Toney is a 75 year old male who presents to the emergency department today for evaluation of possible stroke symptoms.  His care facility reports that he acutely had increased difficulty with speech, increased weakness, prompting him to come to the emergency department.  Her main issue evaluation is left-sided deficit and per EMS they had seen the same patient a week ago when he was conversant and interactive.  As such a code stroke was called.  While the patient was in the scanner was reviewing his chart and it seems as though his symptoms are not particularly unusual for him.  He had a similar episode with infection recently and also has a history of seizures.  His mentation improved early in his ED stay and his initial symptoms may have been secondary to a postictal state.  I am concerned for urinary tract infection as an inciting factor as well.  His urine is currently pending, however, if positive he will be treated with antibiotics.  His most recent culture was sensitive to ceftriaxone.  Patient is signed out to my partner Dr. Rivers awaiting inpatient placement.    Critical Care time was 35 minutes for this patient excluding procedures.     Diagnosis:  Alkterred mental status    CMS Diagnoses: The patient has stroke symptoms:           ED Stroke specific documentation           NIHSS PDF          Protocol PDF     Patient last known well time: Approximately 0330 3/2/2020  ED Provider first to bedside at: 0434  CT Results received at: 0510 and 0545    tPA:   Not given due to stroke mimic: infectious vs seizure.      Disposition:   The patient is admitted into the care of Dr. Solorzano.     Scribe Disclosure:  I, Orla Severson, am serving as a scribe at 4:37 AM on 3/2/2020 to document services personally performed by Galina Chan MD   based on my observations and the provider's statements to me.    EMERGENCY DEPARTMENT       Galina Chan MD  03/02/20 [id number]

## 2020-03-02 NOTE — PLAN OF CARE
Discharge Planner OT   Patient plan for discharge: see chart   Current status: IP OT orders received, chart reviewed, and discussed with IP PT. Per discussion and per chart, at baseline pt requires assist of 2 with use of Ez-stand and has assist for all ADLs. No skilled IP OT warranted. Will cancel/complete IP OT orders/evaluation.   Barriers to return to prior living situation: defer to medical team   Recommendations for discharge: defer to medical team   Rationale for recommendations: No skilled IP OT warranted. Will cancel/complete IP OT orders/evaluation.        Entered by: Milla Dumont 03/02/2020 12:56 PM

## 2020-03-02 NOTE — PLAN OF CARE
Afebrile. NPO until 1300. Alert and oriented x 4.No cough noted. Denies pain.  Turned every 2 hours. Stable.

## 2020-03-02 NOTE — PROGRESS NOTES
EEG done in room 515--iso  Awke, does not follow appropriate, baseline, cp  Dr Cates ordering and reading EEG  [id number]

## 2020-03-02 NOTE — PROGRESS NOTES
"   03/02/20 1438   General Information   Onset Date 03/02/20   Start of Care Date 03/02/20   Referring Physician Ryan Reyes PA-C   Patient Profile Review/OT: Additional Occupational Profile Info See Profile for full history and prior level of function   Patient/Family Goals Statement Brother's goal is to return pt to his most recent diet/thin liquids despite aspiration risk.   Swallowing Evaluation Bedside swallow evaluation   Behaviorial Observations Alert;Distractible;Confused;Initiation problems   Mode of current nutrition NPO   Respiratory Status Room air   Comments See MD notes for Dx, Hx;      Video swallow study SLP notes from 1/17/20: Discussed high risk of aspiration with all PO intake. Pt has a history of PEG tube/trach in 2010 following CVA, however, has been eating a regular diet/thin liquids since 2011. Pt stated that he does not wish to have \"more tubes\", however, unsure level of comprehension as he diverted to different topics. Pt in agreement to discuss with his brother. Called brother and education provided on VFSS results. Brother verbalized agreement that pt would not want a feeding tube, however, deferred to pt wishes. Due to high risk of aspiration with all PO, would recommend NPO with consideration of alternative feeding. Defer further discussion to MD. If pt would like to continue eating and drinking understanding the risk of aspiration, least hazardous diet would be nectar thick by spoon and puree solids with 1:1 supervision to take small bites and sip, slow rate, alternate solids and liquids, double swallows. MD paged.    Diet at discharge in 1/20 was a mechanical soft and thin liquids per pt/brother request for quality of life wishes despite aspiration risk      (Did not test thin given hx of silent aspiration)   Clinical Swallow Evaluation   Oral Musculature anomalies present  (left droop - baseline per records from 1/2020)   Dentition lower dentures  (missing some upper " teeth/partial?)   Clinical Swallow Eval: Thin Liquid Texture Trial   Mode of Presentation, Thin Liquids spoon   Volume of Liquid or Food Presented ice chips x 1   Oral Phase of Swallow Premature pharyngeal entry;Poor AP movement   Pharyngeal Phase of Swallow   (delay)   Diagnostic Statement no overt signs of aspiration    Clinical Swallow Eval: Nectar Thick Liquid Texture Trial   Mode of Presentation, Nectar spoon   Volume of Nectar Presented tsps x 4   Oral Phase, Nectar Poor AP movement;Premature pharyngeal entry   Pharyngeal Phase, Nectar   (delay)   Diagnostic Statement no overt signs of aspiration    Clinical Swallow Eval: Puree Solid Texture Trial   Mode of Presentation, Puree spoon   Volume of Puree Presented tsps x 4   Oral Phase, Puree Poor AP movement;Premature pharyngeal entry   Pharyngeal Phase, Puree   (delay)   Diagnostic Statement no overt signs of aspiration    Swallow Compensations   Swallow Compensations Reduce amounts;Multiple swallow;Alternate viscosity of consistencies   Esophageal Phase of Swallow   Patient reports or presents with symptoms of esophageal dysphagia No   Swallow Eval: Clinical Impressions   Skilled Criteria for Therapy Intervention Skilled criteria met.  Treatment indicated.  (Swallow Tx x 1 for education - goal met)   Functional Assessment Scale (FAS) 2   Treatment Diagnosis mod-severe oral-pharyngeal dysphagia   Diet texture recommendations   (NPO, least hazardous option puree and nectar thick)   Recommended Feeding/Eating Techniques   (supervision/assist, verify/cue swallows, slow rate)   Therapy Frequency   (1x - met)   Predicted Duration of Therapy Intervention (days/wks)   (1x - met)   Anticipated Discharge Disposition extended care facility   Risks and Benefits of Treatment have been explained. Yes   Patient, family and/or staff in agreement with Plan of Care Yes   Clinical Impression Comments A bedside swallow evaluation was completed per RN request.  Pt presents with  mod-severe oral-pharyngeal dysphagia and appears close to baseline.  Pt tolerated puree and nectar thick by spoon with mod-max assist without overt signs of aspiration today during the evaluation.  Pt continues to be a general aspiration risk based on findings from 1/20 video swallow study.  Pt was not able to fully attend to conversation about his wishes for po intake.  Pt's brother was contacted who indicated a desire for pt to return to his mechanical soft diet with thin liquids despite aspiration risk given no new respiratory concerns.  MD was paged for MD/family discussion regarding POC wishes and diet order.  Swallow Tx with education goal has been met.  No further SLP swallow Tx indicated.   Total Evaluation Time   Total Evaluation Time (Minutes) 30

## 2020-03-02 NOTE — PROCEDURES
Procedure Date: 2020      ELECTROENCEPHALOGRAM       ORDERING PROVIDER:  Danyel Cates MD       EEG # [id number], portable        This is a routine digital EEG done in the standard [employer] 10-20 electrode system.  The recording is an awake recording.  There is significant amount of muscle artifact.  The background activity is mainly in the delta and theta frequency.  It broadly appears symmetric.  There are a few areas that may be right hemispheric sharp waves; however, there is so much artifact that that can be difficult to see at times.  No seizures occurred during the recording.  The patient did not fall asleep.      IMPRESSION:  This is an abnormal EEG due to:   1.  Diffuse generalized moderate slowing consistent with a metabolic encephalopathy or diffuse neurodegenerative disorder as well as other possible causes of encephalopathy- clinical correlation required.   2.  Some possible right hemispheric epileptiform sharp waves; however, the significant amount of muscle and motion artifact throughout the recording makes this difficult to interpret.  No seizures occurred during the recording.  The patient did not fall asleep.         DANYEL CATES MD             D: 2020   T: 2020   MT: CATALINA      Name:     HEATHER HAQ   MRN:      [id number]        Account:        [id number]   :      1945           Procedure Date: 2020      Document: [id number]

## 2020-03-03 LAB
ALBUMIN SERPL-MCNC: 1.9 G/DL (ref 3.4–5)
ALP SERPL-CCNC: 47 U/L (ref 40–150)
ALT SERPL W P-5'-P-CCNC: 9 U/L (ref 0–70)
ANION GAP SERPL CALCULATED.3IONS-SCNC: 5 MMOL/L (ref 3–14)
AST SERPL W P-5'-P-CCNC: 12 U/L (ref 0–45)
BACTERIA SPEC CULT: ABNORMAL
BASOPHILS # BLD AUTO: 0 10E9/L (ref 0–0.2)
BASOPHILS NFR BLD AUTO: 0.2 %
BILIRUB SERPL-MCNC: 0.3 MG/DL (ref 0.2–1.3)
BUN SERPL-MCNC: 13 MG/DL (ref 7–30)
CALCIUM SERPL-MCNC: 7.6 MG/DL (ref 8.5–10.1)
CHLORIDE SERPL-SCNC: 114 MMOL/L (ref 94–109)
CO2 SERPL-SCNC: 21 MMOL/L (ref 20–32)
CREAT SERPL-MCNC: 0.69 MG/DL (ref 0.66–1.25)
DIFFERENTIAL METHOD BLD: ABNORMAL
EOSINOPHIL # BLD AUTO: 0 10E9/L (ref 0–0.7)
EOSINOPHIL NFR BLD AUTO: 0.3 %
ERYTHROCYTE [DISTWIDTH] IN BLOOD BY AUTOMATED COUNT: 14.8 % (ref 10–15)
GFR SERPL CREATININE-BSD FRML MDRD: >90 ML/MIN/{1.73_M2}
GLUCOSE SERPL-MCNC: 78 MG/DL (ref 70–99)
HCT VFR BLD AUTO: 32.3 % (ref 40–53)
HGB BLD-MCNC: 10.5 G/DL (ref 13.3–17.7)
IMM GRANULOCYTES # BLD: 0 10E9/L (ref 0–0.4)
IMM GRANULOCYTES NFR BLD: 0.2 %
LYMPHOCYTES # BLD AUTO: 1.7 10E9/L (ref 0.8–5.3)
LYMPHOCYTES NFR BLD AUTO: 28.8 %
Lab: ABNORMAL
MCH RBC QN AUTO: 32 PG (ref 26.5–33)
MCHC RBC AUTO-ENTMCNC: 32.5 G/DL (ref 31.5–36.5)
MCV RBC AUTO: 99 FL (ref 78–100)
MONOCYTES # BLD AUTO: 0.7 10E9/L (ref 0–1.3)
MONOCYTES NFR BLD AUTO: 11.4 %
NEUTROPHILS # BLD AUTO: 3.4 10E9/L (ref 1.6–8.3)
NEUTROPHILS NFR BLD AUTO: 59.1 %
NRBC # BLD AUTO: 0 10*3/UL
NRBC BLD AUTO-RTO: 0 /100
PLATELET # BLD AUTO: 87 10E9/L (ref 150–450)
POTASSIUM SERPL-SCNC: 3.7 MMOL/L (ref 3.4–5.3)
PROT SERPL-MCNC: 5.8 G/DL (ref 6.8–8.8)
RBC # BLD AUTO: 3.28 10E12/L (ref 4.4–5.9)
SODIUM SERPL-SCNC: 140 MMOL/L (ref 133–144)
SPECIMEN SOURCE: ABNORMAL
WBC # BLD AUTO: 5.8 10E9/L (ref 4–11)

## 2020-03-03 PROCEDURE — 25000132 ZZH RX MED GY IP 250 OP 250 PS 637: Performed by: PHYSICIAN ASSISTANT

## 2020-03-03 PROCEDURE — 36415 COLL VENOUS BLD VENIPUNCTURE: CPT | Performed by: PHYSICIAN ASSISTANT

## 2020-03-03 PROCEDURE — 25000132 ZZH RX MED GY IP 250 OP 250 PS 637: Performed by: INTERNAL MEDICINE

## 2020-03-03 PROCEDURE — 85025 COMPLETE CBC W/AUTO DIFF WBC: CPT | Performed by: PHYSICIAN ASSISTANT

## 2020-03-03 PROCEDURE — 99232 SBSQ HOSP IP/OBS MODERATE 35: CPT | Performed by: INTERNAL MEDICINE

## 2020-03-03 PROCEDURE — 12000000 ZZH R&B MED SURG/OB

## 2020-03-03 PROCEDURE — 80053 COMPREHEN METABOLIC PANEL: CPT | Performed by: PHYSICIAN ASSISTANT

## 2020-03-03 RX ORDER — FINASTERIDE 5 MG/1
5 TABLET, FILM COATED ORAL DAILY
Status: DISCONTINUED | OUTPATIENT
Start: 2020-03-03 | End: 2020-03-03

## 2020-03-03 RX ADMIN — GLYCOPYRROLATE 1 MG: 1 TABLET ORAL at 21:08

## 2020-03-03 RX ADMIN — SENNOSIDES AND DOCUSATE SODIUM 1 TABLET: 8.6; 5 TABLET ORAL at 09:02

## 2020-03-03 RX ADMIN — PERAMPANEL 4 MG: 4 TABLET ORAL at 21:07

## 2020-03-03 RX ADMIN — OSELTAMIVIR PHOSPHATE 75 MG: 75 CAPSULE ORAL at 09:02

## 2020-03-03 RX ADMIN — VALPROIC ACID 500 MG: 250 CAPSULE, LIQUID FILLED ORAL at 09:01

## 2020-03-03 RX ADMIN — SIMVASTATIN 10 MG: 10 TABLET, FILM COATED ORAL at 21:08

## 2020-03-03 RX ADMIN — OSELTAMIVIR PHOSPHATE 75 MG: 75 CAPSULE ORAL at 21:08

## 2020-03-03 RX ADMIN — CALCIUM CARBONATE (ANTACID) CHEW TAB 500 MG 500 MG: 500 CHEW TAB at 09:01

## 2020-03-03 RX ADMIN — VALPROIC ACID 1000 MG: 250 CAPSULE, LIQUID FILLED ORAL at 21:08

## 2020-03-03 RX ADMIN — CALCIUM CARBONATE (ANTACID) CHEW TAB 500 MG 500 MG: 500 CHEW TAB at 21:08

## 2020-03-03 RX ADMIN — FINASTERIDE 5 MG: 5 TABLET, FILM COATED ORAL at 09:02

## 2020-03-03 RX ADMIN — SENNOSIDES AND DOCUSATE SODIUM 1 TABLET: 8.6; 5 TABLET ORAL at 21:08

## 2020-03-03 RX ADMIN — GLYCOPYRROLATE 1 MG: 1 TABLET ORAL at 16:08

## 2020-03-03 RX ADMIN — APIXABAN 2.5 MG: 2.5 TABLET, FILM COATED ORAL at 09:02

## 2020-03-03 RX ADMIN — GLYCOPYRROLATE 1 MG: 1 TABLET ORAL at 09:01

## 2020-03-03 RX ADMIN — APIXABAN 2.5 MG: 2.5 TABLET, FILM COATED ORAL at 21:07

## 2020-03-03 RX ADMIN — ZONISAMIDE 300 MG: 100 CAPSULE ORAL at 09:01

## 2020-03-03 ASSESSMENT — ACTIVITIES OF DAILY LIVING (ADL)
ADLS_ACUITY_SCORE: 37
ADLS_ACUITY_SCORE: 37
ADLS_ACUITY_SCORE: 34
ADLS_ACUITY_SCORE: 37
ADLS_ACUITY_SCORE: 34
ADLS_ACUITY_SCORE: 34

## 2020-03-03 NOTE — PLAN OF CARE
Disoriented to situation. VSS. CMS intact. Neuros with garbled, slow to respond, slurred speech, left facial droop, LLE weakness. Tele SR. L leg scabs NICHOLE, no drainage. R leg dressing CDI. Coccyx foam CDI. Suprapubic cath with adequate UOP. Denies pain. IVF. Mechanical soft diet, pills one at a time. Bedrest, A2 lift. Discharge back to nursing home pending. Continue to monitor.

## 2020-03-03 NOTE — PLAN OF CARE
Pt Alert to self and time, some confusion. Nonproductive congested cough. IV infusing. L sided weakness/facial droop. On tele. Mechanical soft diet, swallowed pills fine. Denied pain.

## 2020-03-04 PROBLEM — J10.1 INFLUENZA A: Status: ACTIVE | Noted: 2020-03-04

## 2020-03-04 LAB
ERYTHROCYTE [DISTWIDTH] IN BLOOD BY AUTOMATED COUNT: 14.8 % (ref 10–15)
HCT VFR BLD AUTO: 33.2 % (ref 40–53)
HGB BLD-MCNC: 10.9 G/DL (ref 13.3–17.7)
MCH RBC QN AUTO: 32.2 PG (ref 26.5–33)
MCHC RBC AUTO-ENTMCNC: 32.8 G/DL (ref 31.5–36.5)
MCV RBC AUTO: 98 FL (ref 78–100)
PLATELET # BLD AUTO: 80 10E9/L (ref 150–450)
RBC # BLD AUTO: 3.39 10E12/L (ref 4.4–5.9)
WBC # BLD AUTO: 4.2 10E9/L (ref 4–11)
ZONISAMIDE SERPL-MCNC: 20 UG/ML (ref 10–40)

## 2020-03-04 PROCEDURE — 85027 COMPLETE CBC AUTOMATED: CPT | Performed by: INTERNAL MEDICINE

## 2020-03-04 PROCEDURE — 25000132 ZZH RX MED GY IP 250 OP 250 PS 637: Performed by: PHYSICIAN ASSISTANT

## 2020-03-04 PROCEDURE — 36415 COLL VENOUS BLD VENIPUNCTURE: CPT | Performed by: INTERNAL MEDICINE

## 2020-03-04 PROCEDURE — 12000000 ZZH R&B MED SURG/OB

## 2020-03-04 PROCEDURE — 25000132 ZZH RX MED GY IP 250 OP 250 PS 637: Performed by: INTERNAL MEDICINE

## 2020-03-04 PROCEDURE — 25800030 ZZH RX IP 258 OP 636: Performed by: PHYSICIAN ASSISTANT

## 2020-03-04 PROCEDURE — 99239 HOSP IP/OBS DSCHRG MGMT >30: CPT | Performed by: INTERNAL MEDICINE

## 2020-03-04 RX ORDER — OSELTAMIVIR PHOSPHATE 75 MG/1
75 CAPSULE ORAL 2 TIMES DAILY
DISCHARGE
Start: 2020-03-04 | End: 2020-03-05

## 2020-03-04 RX ADMIN — OSELTAMIVIR PHOSPHATE 75 MG: 75 CAPSULE ORAL at 21:10

## 2020-03-04 RX ADMIN — OSELTAMIVIR PHOSPHATE 75 MG: 75 CAPSULE ORAL at 08:27

## 2020-03-04 RX ADMIN — SENNOSIDES AND DOCUSATE SODIUM 1 TABLET: 8.6; 5 TABLET ORAL at 08:35

## 2020-03-04 RX ADMIN — VALPROIC ACID 1000 MG: 250 CAPSULE, LIQUID FILLED ORAL at 21:11

## 2020-03-04 RX ADMIN — SODIUM CHLORIDE: 9 INJECTION, SOLUTION INTRAVENOUS at 03:11

## 2020-03-04 RX ADMIN — APIXABAN 2.5 MG: 2.5 TABLET, FILM COATED ORAL at 08:27

## 2020-03-04 RX ADMIN — FINASTERIDE 5 MG: 5 TABLET, FILM COATED ORAL at 08:35

## 2020-03-04 RX ADMIN — SIMVASTATIN 10 MG: 10 TABLET, FILM COATED ORAL at 21:11

## 2020-03-04 RX ADMIN — ZONISAMIDE 300 MG: 100 CAPSULE ORAL at 08:27

## 2020-03-04 RX ADMIN — GLYCOPYRROLATE 1 MG: 1 TABLET ORAL at 21:11

## 2020-03-04 RX ADMIN — APIXABAN 2.5 MG: 2.5 TABLET, FILM COATED ORAL at 21:12

## 2020-03-04 RX ADMIN — CALCIUM CARBONATE (ANTACID) CHEW TAB 500 MG 500 MG: 500 CHEW TAB at 21:10

## 2020-03-04 RX ADMIN — GLYCOPYRROLATE 1 MG: 1 TABLET ORAL at 15:36

## 2020-03-04 RX ADMIN — CALCIUM CARBONATE (ANTACID) CHEW TAB 500 MG 500 MG: 500 CHEW TAB at 08:27

## 2020-03-04 RX ADMIN — VALPROIC ACID 500 MG: 250 CAPSULE, LIQUID FILLED ORAL at 08:27

## 2020-03-04 RX ADMIN — SODIUM CHLORIDE: 9 INJECTION, SOLUTION INTRAVENOUS at 13:16

## 2020-03-04 RX ADMIN — SODIUM CHLORIDE: 9 INJECTION, SOLUTION INTRAVENOUS at 21:47

## 2020-03-04 RX ADMIN — PERAMPANEL 4 MG: 4 TABLET ORAL at 21:10

## 2020-03-04 RX ADMIN — POLYETHYLENE GLYCOL 3350 17 G: 17 POWDER, FOR SOLUTION ORAL at 13:12

## 2020-03-04 RX ADMIN — GLYCOPYRROLATE 1 MG: 1 TABLET ORAL at 08:27

## 2020-03-04 ASSESSMENT — ACTIVITIES OF DAILY LIVING (ADL)
ADLS_ACUITY_SCORE: 33
ADLS_ACUITY_SCORE: 33
ADLS_ACUITY_SCORE: 37
ADLS_ACUITY_SCORE: 33

## 2020-03-04 NOTE — PLAN OF CARE
Pt disoriented to situation, slow to respond. L facial droop/L sided weakness per pt baseline. VSS on RA. LS coarse. Congested, non-productive cough. Tele sinus mirna. Suprapubic catheter patent, still inc at times. Turn & repo Q 2 hr. Wound to L foot. Denies pain. Contact and droplet precautions. Plans to return to nursing home today pending private bed availability. Will continue to monitor.

## 2020-03-04 NOTE — DISCHARGE SUMMARY
[facility]  Hospitalist Discharge Summary       Date of Admission:  3/2/2020  Date of Discharge:  3/4/2020  Discharging Provider: Keenan Casey MD      Discharge Diagnoses   Influenza A  Acute metabolic encephalopathy    Follow-ups Needed After Discharge   Follow-up Appointments     Follow Up and recommended labs and tests      Recheck platelet count next Monday and need to call into his PCP or   nursing home physician to see if apixaban dosing needs adjusted (would   consider stopping this medication if platelet count less than 70 and would   need to consider peripheral smear and additional workup if that occurred   but suspect it to normalize).  Follow up with Nursing home physician within 7 days.          PCP to Consider Outpatient thyroid US for thyroid nodule.      Unresulted Labs Ordered in the Past 30 Days of this Admission     Date and Time Order Name Status Description    3/2/2020 0447 Zonisamide Level Quantitative In process     3/2/2020 0446 Blood culture Preliminary     3/2/2020 0446 Blood culture Preliminary       The zonisamide level should be followed up by TCU provider and neurology with adjustments to medications as needed.  Blood cultures would be called out if positive and patient was not discharged on antibiotics.    Discharge Disposition   Discharged to short-term care facility  Condition at discharge: Stable    Hospital Course   Josiah Toney is a 75 year old male admitted on 3/2/2020. 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      Acute metabolic  encephalopathy.  Resides at a nursing home and staff noticed patient was less responsive, change in speech and drooling.  Code stroke initiated.  Head CT and Head/Neck CTA negative for acute CVA but noted previous right MCA territory. Notably low grade fever with recent cough and history of urinary retention with UTI. Lactic WNL 0.6, Ammonia WNL 24.  Chest Xray negative.  EKG with sinus rhythm. Influenza swab was positive. UA with positive nitrites, small LE, WBC of 15 cath specimen. EEG negative for seizure. Valproic acid level 76.  --UCX pending with four organisms in isolate from suprapubic catheter that are all thought to be just colonization and not infection, no antibiotics indicated at this time  --improving with treatment of influenza  --General Neurology consult appreciated    --PT/OT/SLP and SW consulted, patient has bed hold once treated with 3 doses of tamiflu but needs to change to private room, continue PT/OT at TCU     Influenza:  Patient with a cough that he stated has been present for a month.  Noted low grade temp in the ED. Influenza was positive. Chest Xray (1 view) negative.  --Tamiflu for 5 day course    Suprapubic catheter colonization and history of Recurrent UTI's:  Previous admission here at [facility] 1/15/2020-1/19/2020 for AMS secondary to UTI. At that time culture grew out many organisms (100K Klebsiella, Proteus, and Enterococcus, all sensitive to Augmentin)  --UA (positive nitrites, small LE, WBC of 15 cath specimen).    --UCX pending with four organisms in isolate from suprapubic catheter that are all thought to be just colonization and not infection, no antibiotics indicated at this time  --follow up with PCP and urology for ongoing management    Chronic memory/cognitive impairment:  History of right MCA CVA with chronic left sided spastic hemiparesis (~10 years ago):   Cerebral palsy:  Appears stable. Mentation improved with treatment of influenza.    Incidental thyroid  nodules:  Noted on CT scans. TSH WNL at 3.04.    --PCP to Consider Outpatient thyroid US.         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  Valproic acid level of 76. EEG negative.  --Follow Zonisamide level that is still pending.     --Neurology appreciated  --Continue PTA Depakene 500 mg every morning and 1000 mg at bedtime.  Level appropriate.  --Continue PTA Fycompa 4 mg at bedtime.    --Continue PTA Zonegran 300 mg every morning.      Thrombocytopenia:  Baseline platelet count is in the 160's. Platelets low at 113 on admission and decreased to 87 on 3/3.    --likely from infection and bone marrow suppression with dilution  --Recheck CBC in the morning, if platelet count improving can continue eliquis and repeat CBC in 1 week     History of DVT:  --Continue PTA Eliquis 2.5 mg BID and monitor platelet count closely.    Dysphagia:  Speech articulation D/O:  Last admission Speech recommended NPO with feeding tube.  After discussion with patient's brother (Jeff) it was indicated that they would want to take the risk of aspiration and allow patient to eat without restrictions. Chest Xray negative.   --Appreciate speech eval and recommendation for dysphagia diet with thickened liquids. Family is aware and wants mechanical soft diet with regular liquids.  --Will respect wishes of family and continue mechanical soft diet with regular liquids despite risk of aspiration.    BPH with history of urinary retention s/p cystolitholapaxy of bladder and suprapubic cystotomy with tube placement (1/2017):  Follows with Urology at [facility].    --Continue PTA Proscar 5 mg daily.      Mixed hyperlipidemia:  --Continue PTA simvastatin 10 mg at bedtime.      Chronic sialadenitis:  --Continue PTA Robinul 1 mg TID.      Chronic anemia:  Baseline hemoglobin between 11-12.  Stable.      Consultations This Hospital Stay   NEUROLOGY IP CONSULT  PHARMACY IP CONSULT  OCCUPATIONAL THERAPY  ADULT IP CONSULT  PHYSICAL THERAPY ADULT IP CONSULT  SOCIAL WORK IP CONSULT  SPEECH LANGUAGE PATH ADULT IP CONSULT  PHYSICAL THERAPY ADULT IP CONSULT  OCCUPATIONAL THERAPY ADULT IP CONSULT    Code Status   Full Code    Time Spent on this Encounter   I, Keenan Casey MD personally saw the patient today and spent greater than 30 minutes discharging this patient.       Keenan Casey MD  [facility]  ______________________________________________________________________    Physical Exam   Vital Signs: Temp: 97.4  F (36.3  C) Temp src: Oral BP: 91/44 Pulse: 73 Heart Rate: (!) 49 Resp: 16 SpO2: 95 % O2 Device: None (Room air)    Weight: 155 lbs 9.6 oz  Constitutional: Awake, alert, cooperative, limited conversation ability, no apparent distress  Respiratory: Clear to auscultation bilaterally, no crackles or wheezing  Cardiovascular: Regular rate and rhythm, normal S1 and S2, and no murmur noted  GI: Normal bowel sounds, soft, non-distended, non-tender  Skin/Integumen: No rashes, no cyanosis, no edema  Other:       Primary Care Physician   YOSHI GAFFNEY    Discharge Orders      General info for SNF    Length of Stay Estimate: Short Term Care: Estimated # of Days <30  Condition at Discharge: Improving  Level of care:skilled   Rehabilitation Potential: Good  Admission H&P remains valid and up-to-date: Yes  Recent Chemotherapy: N/A  Use Nursing Home Standing Orders: Yes     Mantoux instructions    Give two-step Mantoux (PPD) Per Facility Policy Yes     Reason for your hospital stay    Admitted for influenza and confusion. Improving. Bacteria from suprapubic catheter thought to be colonization and not an infection. Discharge on 3 more days of tamiflu to complete 5 day course.  Drop in platelets noted while on blood thinners of history of DVT and suspect this is from the infection and should start to improve.  Will recheck platelet count next Monday 3/9/20 and need to call into his PCP to see if  apixaban dosing needs adjusted (would consider stopping this medication if platelet count less than 70 and would need to consider peripheral smear and additional workup).     Follow Up and recommended labs and tests    Recheck platelet count next Monday and need to call into his PCP or nursing home physician to see if apixaban dosing needs adjusted (would consider stopping this medication if platelet count less than 70 and would need to consider peripheral smear and additional workup if that occurred but suspect it to normalize).  Follow up with Nursing home physician within 7 days.     Activity - Up ad nader     Shearer catheter    To straight gravity drainage. Chronic suprapubic catheter. Continue management as previously.     Full Code     Physical Therapy Adult Consult    Evaluate and treat as clinically indicated.    Reason:  Physical deconditioning     Occupational Therapy Adult Consult    Evaluate and treat as clinically indicated.    Reason:  Physical deconditioning     Advance Diet as Tolerated    Follow this diet upon discharge: Mechanical/Dental Soft Diet       Significant Results and Procedures   Most Recent 3 CBC's:  Recent Labs   Lab Test 03/04/20  0717 03/03/20  0700 03/02/20  0447   WBC 4.2 5.8 5.8   HGB 10.9* 10.5* 11.7*   MCV 98 99 100   PLT 80* 87* 113*     Most Recent 3 BMP's:  Recent Labs   Lab Test 03/03/20  0700 03/02/20  0447 01/19/20  0441    139 141   POTASSIUM 3.7 3.7 4.4   CHLORIDE 114* 111* 115*   CO2 21 23 24   BUN 13 20 16   CR 0.69 0.76 0.69   ANIONGAP 5 5 2*   BREANN 7.6* 7.8* 8.2*   GLC 78 96 98     Most Recent 2 LFT's:  Recent Labs   Lab Test 03/03/20  0700 03/02/20  0447   AST 12 16   ALT 9 10   ALKPHOS 47 58   BILITOTAL 0.3 0.2     Most Recent 6 Bacteria Isolates From Any Culture (See EPIC Reports for Culture Details):  Recent Labs   Lab Test 03/02/20  0641 03/02/20  0538 03/02/20  0439 01/15/20  0440 01/15/20  0337 01/15/20  0336   CULT >100,000 colonies/mL  Non lactose  fermenting gram negative rods  *  >100,000 colonies/mL  Gram positive cocci  *  50,000 to 100,000 colonies/mL  Strain 2  Gram positive cocci  *  10,000 to 50,000 colonies/mL  Strain 2  Non lactose fermenting gram negative rods  *  No further identification  Susceptibility testing not routinely done   No growth after 2 days No growth after 2 days No growth >100,000 colonies/mL  Klebsiella pneumoniae  *  >100,000 colonies/mL  Proteus mirabilis  *  >100,000 colonies/mL  Enterococcus faecalis  * No growth   ,   Results for orders placed or performed during the hospital encounter of 03/02/20   XR Chest Port 1 View    Narrative    EXAM: XR CHEST PORT 1 VW  LOCATION: [facility]  DATE/TIME: 3/2/2020 5:13 AM    INDICATION: Fever.  COMPARISON: 01/17/2020.    FINDINGS: The heart size is normal. The lungs are clear. No pneumothorax.      Impression    IMPRESSION: No acute abnormality.            CT Head w/o Contrast    Narrative    EXAM: CTA  HEAD NECK WITH CONTRAST, CT HEAD PERFUSION WITH CONTRAST, CT HEAD W/O CONTRAST  LOCATION: [facility]  DATE/TIME: 3/2/2020 4:50 AM    INDICATION: Right facial droop  COMPARISON: CT head 01/15/2020  TECHNIQUE: Head and neck CT angiogram with IV contrast. Noncontrast head CT followed by axial helical CT images of the head and neck vessels obtained during the arterial phase of intravenous contrast administration. Axial 2D reconstructed images and   multiplanar 3D MIP reconstructed images of the head and neck vessels were performed by the technologist. Additional CT cerebral perfusion was performed utilizing a second contrast bolus. Perfusion data were post processed with generation of standard   perfusion maps and estimation of ischemic/infarcted volumes utilizing standard threshold values. Dose reduction techniques were used.  All stenosis measurements made according to NASCET criteria unless otherwise specified.  CONTRAST: 70mL Isovue-370 (accession  [id number]), 50mL Isovue-370 (accession [id number])  COMPARISON: None.    FINDINGS:   NONCONTRAST HEAD CT:   INTRACRANIAL CONTENTS: No intracranial hemorrhage, extraaxial collection, or mass effect.  No CT evidence of acute infarct. Mild to moderate presumed chronic small vessel ischemic changes. Mild to moderate generalized volume loss. No hydrocephalus.     Right MCA territory remote infarct involving predominantly the right basal ganglia, right insula, right temporal lobe.      HEAD CTA:  No intracranial arterial large vessel occlusion.    ANTERIOR CIRCULATION: No significant stenosis/occlusion, aneurysm, or high flow vascular malformation. Developmentally hypoplastic right A1 anterior cerebral artery segment.    POSTERIOR CIRCULATION: No significant stenosis/occlusion, aneurysm, or high flow vascular malformation. Balanced vertebral arteries supply a normal basilar artery.       NECK CTA:  RIGHT CAROTID: No significant stenosis or dissection.    LEFT CAROTID: No significant stenosis or dissection.    VERTEBRAL ARTERIES: No significant stenosis or dissection. Balanced vertebral arteries.    AORTIC ARCH: Classic aortic arch anatomy with no significant stenosis at the origin of the great vessels.    ARTERIAL PLAQUE:   Mild calcified plaque left vertebral artery origin.   Bilateral carotid siphons demonstrate calcified plaque.   Right carotid bulb mild noncalcified plaque.    NONVASCULAR STRUCTURES:     Left inferior axilla sinus mild to moderate polypoidal mucosal thickening. Right inferior maxillary sinus mild polypoidal mucosal thickening.    Conus medullaris.    Degenerative changes noted within the spine.      C1 transverse ligament calcification suggesting crystal deposition arthropathy.    Scattered foci air within the venous system nonspecific likely iatrogenic.    Bilateral thyroid lobe nodules largest in the right thyroid lobe measuring 2.4 cm. Recommend nonemergent thyroid ultrasound based upon ACR white  paper criteria.      Right lung apex small postinflammatory calcified granuloma.    Dental amalgam resulting in streak artifact.      CT PERFUSION:  PERFUSION MAPS:   Increase Tmax right temporal lobe in region of remote infarct.    No acute perfusion deficits identified.    RAPID ANALYSIS:  CBF<30%: 0 mL  Tmax>6sec: 9 mL in region of right temporal lobe  Mismatch volume: 9 mL  Mismatch ratio: Not applicable        Impression    IMPRESSION:   HEAD CT:  1.  Aspect score 10.  2.  No acute intracranial hemorrhage.  3.  Right MCA territory remote infarct as described above.  4.  Age-related changes described above.     Dr Galina Chan  was notified by Dr Gurjit Alicia at 5:10 AM 03/02/2020.    HEAD CTA:   1.  No intracranial arterial large vessel occlusion.  2.  Proximal intracranial arteries demonstrate no significant stenosis or occlusion.     NECK CTA:  1.  Cervical arteries demonstrate no significant stenosis or occlusion.   2.  Thyroid nodules as described above. Recommend nonemergent thyroid ultrasound based upon ACR white paper criteria.   3.  Additional findings above.      CT PERFUSION:  No acute perfusion deficits identified.    Dr Galina Chan was notified by Dr Gurjit Alicia at 5:45 AM 03/02/2020.   CTA Head Neck with Contrast    Narrative    EXAM: CTA  HEAD NECK WITH CONTRAST, CT HEAD PERFUSION WITH CONTRAST, CT HEAD W/O CONTRAST  LOCATION: [facility]  DATE/TIME: 3/2/2020 4:50 AM    INDICATION: Right facial droop  COMPARISON: CT head 01/15/2020  TECHNIQUE: Head and neck CT angiogram with IV contrast. Noncontrast head CT followed by axial helical CT images of the head and neck vessels obtained during the arterial phase of intravenous contrast administration. Axial 2D reconstructed images and   multiplanar 3D MIP reconstructed images of the head and neck vessels were performed by the technologist. Additional CT cerebral perfusion was performed utilizing a second contrast bolus. Perfusion data  were post processed with generation of standard   perfusion maps and estimation of ischemic/infarcted volumes utilizing standard threshold values. Dose reduction techniques were used.  All stenosis measurements made according to NASCET criteria unless otherwise specified.  CONTRAST: 70mL Isovue-370 (accession [id number]), 50mL Isovue-370 (accession [id number])  COMPARISON: None.    FINDINGS:   NONCONTRAST HEAD CT:   INTRACRANIAL CONTENTS: No intracranial hemorrhage, extraaxial collection, or mass effect.  No CT evidence of acute infarct. Mild to moderate presumed chronic small vessel ischemic changes. Mild to moderate generalized volume loss. No hydrocephalus.     Right MCA territory remote infarct involving predominantly the right basal ganglia, right insula, right temporal lobe.      HEAD CTA:  No intracranial arterial large vessel occlusion.    ANTERIOR CIRCULATION: No significant stenosis/occlusion, aneurysm, or high flow vascular malformation. Developmentally hypoplastic right A1 anterior cerebral artery segment.    POSTERIOR CIRCULATION: No significant stenosis/occlusion, aneurysm, or high flow vascular malformation. Balanced vertebral arteries supply a normal basilar artery.       NECK CTA:  RIGHT CAROTID: No significant stenosis or dissection.    LEFT CAROTID: No significant stenosis or dissection.    VERTEBRAL ARTERIES: No significant stenosis or dissection. Balanced vertebral arteries.    AORTIC ARCH: Classic aortic arch anatomy with no significant stenosis at the origin of the great vessels.    ARTERIAL PLAQUE:   Mild calcified plaque left vertebral artery origin.   Bilateral carotid siphons demonstrate calcified plaque.   Right carotid bulb mild noncalcified plaque.    NONVASCULAR STRUCTURES:     Left inferior axilla sinus mild to moderate polypoidal mucosal thickening. Right inferior maxillary sinus mild polypoidal mucosal thickening.    Conus medullaris.    Degenerative changes noted within the spine.       C1 transverse ligament calcification suggesting crystal deposition arthropathy.    Scattered foci air within the venous system nonspecific likely iatrogenic.    Bilateral thyroid lobe nodules largest in the right thyroid lobe measuring 2.4 cm. Recommend nonemergent thyroid ultrasound based upon ACR white paper criteria.      Right lung apex small postinflammatory calcified granuloma.    Dental amalgam resulting in streak artifact.      CT PERFUSION:  PERFUSION MAPS:   Increase Tmax right temporal lobe in region of remote infarct.    No acute perfusion deficits identified.    RAPID ANALYSIS:  CBF<30%: 0 mL  Tmax>6sec: 9 mL in region of right temporal lobe  Mismatch volume: 9 mL  Mismatch ratio: Not applicable        Impression    IMPRESSION:   HEAD CT:  1.  Aspect score 10.  2.  No acute intracranial hemorrhage.  3.  Right MCA territory remote infarct as described above.  4.  Age-related changes described above.     Dr Galina Chan  was notified by Dr Gurjit Alicia at 5:10 AM 03/02/2020.    HEAD CTA:   1.  No intracranial arterial large vessel occlusion.  2.  Proximal intracranial arteries demonstrate no significant stenosis or occlusion.     NECK CTA:  1.  Cervical arteries demonstrate no significant stenosis or occlusion.   2.  Thyroid nodules as described above. Recommend nonemergent thyroid ultrasound based upon ACR white paper criteria.   3.  Additional findings above.      CT PERFUSION:  No acute perfusion deficits identified.    Dr Galina Chan was notified by Dr Gurjit Alicia at 5:45 AM 03/02/2020.   CT Head Perfusion w Contrast    Narrative    EXAM: CTA  HEAD NECK WITH CONTRAST, CT HEAD PERFUSION WITH CONTRAST, CT HEAD W/O CONTRAST  LOCATION: [facility]  DATE/TIME: 3/2/2020 4:50 AM    INDICATION: Right facial droop  COMPARISON: CT head 01/15/2020  TECHNIQUE: Head and neck CT angiogram with IV contrast. Noncontrast head CT followed by axial helical CT images of the head and neck vessels  obtained during the arterial phase of intravenous contrast administration. Axial 2D reconstructed images and   multiplanar 3D MIP reconstructed images of the head and neck vessels were performed by the technologist. Additional CT cerebral perfusion was performed utilizing a second contrast bolus. Perfusion data were post processed with generation of standard   perfusion maps and estimation of ischemic/infarcted volumes utilizing standard threshold values. Dose reduction techniques were used.  All stenosis measurements made according to NASCET criteria unless otherwise specified.  CONTRAST: 70mL Isovue-370 (accession [id number]), 50mL Isovue-370 (accession [id number])  COMPARISON: None.    FINDINGS:   NONCONTRAST HEAD CT:   INTRACRANIAL CONTENTS: No intracranial hemorrhage, extraaxial collection, or mass effect.  No CT evidence of acute infarct. Mild to moderate presumed chronic small vessel ischemic changes. Mild to moderate generalized volume loss. No hydrocephalus.     Right MCA territory remote infarct involving predominantly the right basal ganglia, right insula, right temporal lobe.      HEAD CTA:  No intracranial arterial large vessel occlusion.    ANTERIOR CIRCULATION: No significant stenosis/occlusion, aneurysm, or high flow vascular malformation. Developmentally hypoplastic right A1 anterior cerebral artery segment.    POSTERIOR CIRCULATION: No significant stenosis/occlusion, aneurysm, or high flow vascular malformation. Balanced vertebral arteries supply a normal basilar artery.       NECK CTA:  RIGHT CAROTID: No significant stenosis or dissection.    LEFT CAROTID: No significant stenosis or dissection.    VERTEBRAL ARTERIES: No significant stenosis or dissection. Balanced vertebral arteries.    AORTIC ARCH: Classic aortic arch anatomy with no significant stenosis at the origin of the great vessels.    ARTERIAL PLAQUE:   Mild calcified plaque left vertebral artery origin.   Bilateral carotid siphons  demonstrate calcified plaque.   Right carotid bulb mild noncalcified plaque.    NONVASCULAR STRUCTURES:     Left inferior axilla sinus mild to moderate polypoidal mucosal thickening. Right inferior maxillary sinus mild polypoidal mucosal thickening.    Conus medullaris.    Degenerative changes noted within the spine.      C1 transverse ligament calcification suggesting crystal deposition arthropathy.    Scattered foci air within the venous system nonspecific likely iatrogenic.    Bilateral thyroid lobe nodules largest in the right thyroid lobe measuring 2.4 cm. Recommend nonemergent thyroid ultrasound based upon ACR white paper criteria.      Right lung apex small postinflammatory calcified granuloma.    Dental amalgam resulting in streak artifact.      CT PERFUSION:  PERFUSION MAPS:   Increase Tmax right temporal lobe in region of remote infarct.    No acute perfusion deficits identified.    RAPID ANALYSIS:  CBF<30%: 0 mL  Tmax>6sec: 9 mL in region of right temporal lobe  Mismatch volume: 9 mL  Mismatch ratio: Not applicable        Impression    IMPRESSION:   HEAD CT:  1.  Aspect score 10.  2.  No acute intracranial hemorrhage.  3.  Right MCA territory remote infarct as described above.  4.  Age-related changes described above.     Dr Galina Chan  was notified by Dr Gurjit Alicia at 5:10 AM 03/02/2020.    HEAD CTA:   1.  No intracranial arterial large vessel occlusion.  2.  Proximal intracranial arteries demonstrate no significant stenosis or occlusion.     NECK CTA:  1.  Cervical arteries demonstrate no significant stenosis or occlusion.   2.  Thyroid nodules as described above. Recommend nonemergent thyroid ultrasound based upon ACR white paper criteria.   3.  Additional findings above.      CT PERFUSION:  No acute perfusion deficits identified.    Dr Galina Chan was notified by Dr Gurjit Alicia at 5:45 AM 03/02/2020.       Discharge Medications   Current Discharge Medication List      START taking these  medications    Details   oseltamivir (TAMIFLU) 75 MG capsule Take 1 capsule (75 mg) by mouth 2 times daily for 6 doses    Associated Diagnoses: Influenza A         CONTINUE these medications which have NOT CHANGED    Details   acetaminophen (TYLENOL) 325 MG tablet Take 650 mg by mouth every 6 hours as needed for mild pain      apixaban ANTICOAGULANT (ELIQUIS) 2.5 MG tablet Take 2.5 mg by mouth 2 times daily      calcium carbonate (TUMS) 500 MG chewable tablet Take 1 chew tab by mouth 2 times daily      finasteride (PROSCAR) 5 MG tablet Take 5 mg by mouth daily      glycopyrrolate (ROBINUL) 1 MG tablet Take 1 mg by mouth 3 times daily    Associated Diagnoses: Other forms of epilepsy and recurrent seizures with intractable epilepsy      multivitamin w/minerals (MULTI-VITAMIN) tablet Take 1 tablet by mouth daily      perampanel (FYCOMPA) 4 MG tablet Take 1 tablet (4 mg) by mouth At Bedtime  Qty: 30 tablet, Refills: 0    Associated Diagnoses: Seizure disorder (H)      polyethylene glycol (MIRALAX) powder Take 17 g by mouth every morning Mix in 8 oz of liquid.      senna-docusate (SENOKOT-S/PERICOLACE) 8.6-50 MG tablet Take 1 tablet by mouth 2 times daily      simvastatin (ZOCOR) 10 MG tablet Take 10 mg by mouth At Bedtime      !! valproic acid (DEPAKENE) 250 MG capsule Take 500 mg by mouth every morning      !! valproic acid (DEPAKENE) 250 MG capsule Take 1,000 mg by mouth At Bedtime      zonisamide (ZONEGRAN) 100 MG capsule Take 300 mg by mouth every morning       !! - Potential duplicate medications found. Please discuss with provider.        Allergies   No Known Allergies

## 2020-03-04 NOTE — PLAN OF CARE
Alert, disoriented to situation,  slow to respond.  Left side facial droop and left side body weakness due to old stroke.  Turn and repositioned every 2 hours.  Bilateral buttocks blanchable red/purple areas, mepilex applied.  Tele sinus mirna.  Supra pubic catheter patent.  Patient c/o feeling constipated, prune juice given along with scheduled medications,  Incontinent of Bowel X1 today, large and loose.  Denies pain.  Plan to discharge back to LTC tomorrow.

## 2020-03-04 NOTE — PROGRESS NOTES
[facility]    Medicine Progress Note - Hospitalist Service       Date of Admission:  3/2/2020  Assessment & Plan   Josiah Toney is a 75 year old male admitted on 3/2/2020. 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      Acute metabolic encephalopathy.  Resides at a nursing home and staff noticed patient was less responsive, change in speech and drooling.  Code stroke initiated.  Head CT and Head/Neck CTA negative for acute CVA but noted previous right MCA territory. Notably low grade fever with recent cough and history of urinary retention with UTI. Lactic WNL 0.6, Ammonia WNL 24.  Chest Xray negative.  EKG with sinus rhythm. Influenza swab was positive. UA with positive nitrites, small LE, WBC of 15 cath specimen. EEG negative for seizure. Valproic acid level 76.  --Blood culture (no growth to date).   --UCX pending with four organisms in isolate from suprapubic catheter, hold off on antibiotics for now as these are likely colonized organisms with patient improving on tamiflu  --General Neurology consult appreciated    --q4h neuro checks  --PT/OT/SLP and SW consulted, patient has bed hold once treated with 3 doses of tamiflu but needs to change to private room   --brother (Jeff) would like to be updated daily    Influenza:  Patient with a cough that he stated has been present for a month.  Noted low grade temp in the ED. Influenza was positive. Chest Xray (1 view) negative.  --Tamiflu for 5 day course  --Droplet/Contact isolation.      Suprapubic catheter colonization and history  of Recurrent UTI's:  Previous admission here at [facility] 1/15/2020-1/19/2020 for AMS secondary to UTI. At that time culture grew out many organisms (100K Klebsiella, Proteus, and Enterococcus, all sensitive to Augmentin)  --UA (positive nitrites, small LE, WBC of 15 cath specimen).    --UCX pending with four organisms in isolate from suprapubic catheter, hold off on antibiotics for now as these are likely colonized organisms with patient improving on tamiflu    Chronic memory/cognitive impairment:  History of right MCA CVA with chronic left sided spastic hemiparesis (~10 years ago):   Cerebral palsy:  Appears stable. Unclear what patient's baseline mentation is.  --Stroke work-up negative in the ED and Code Stroke de-escaleted.    --PT/OT/SLP appreciated    Incidental thyroid nodules:  Noted on CT scans. TSH WNL at 3.04.    --Outpatient thyroid US.         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  Valproic acid level of 76. EEG negative.  --Follow Zonisamide level that is still pending.     --Neurology appreciated  --Continue PTA Depakene 500 mg every morning and 1000 mg at bedtime.  Level appropriate.  --Continue PTA Fycompa 4 mg at bedtime.    --Continue PTA Zonegran 300 mg every morning.      Thrombocytopenia:  Baseline platelet count is in the 160's. Platelets low at 113 on admission and decreased to 87 on 3/3.    --likely from infection and bone marrow suppression with dilution  --Recheck CBC in the morning, if platelet count improving can continue eliquis and repeat CBC in 1 week     History of DVT:  --Continue PTA Eliquis 2.5 mg BID and monitor platelet count closely.    Dysphagia:  Speech articulation D/O:  Last admission Speech recommended NPO with feeding tube.  After discussion with patient's brother (Jeff) it was indicated that they would want to take the risk of aspiration and allow patient to eat without restrictions. Chest Xray negative.    --Appreciate speech eval and recommendation for dysphagia diet with thickened liquids. Family is aware and wants mechanical soft diet with regular liquids.  --Will respect wishes of family and continue mechanical soft diet with regular liquids despite risk of aspiration.    BPH with history of urinary retention s/p cystolitholapaxy of bladder and suprapubic cystotomy with tube placement (1/2017):  [place] [facility] [place]    --Continue PTA Proscar 5 mg daily.      Mixed hyperlipidemia:  --Continue PTA simvastatin 10 mg at bedtime.      Chronic sialadenitis:  --Continue PTA Robinul 1 mg TID.      Chronic anemia:  Baseline hemoglobin between 11-12.  Stable.    --Monitor.     Diet: Mechanical/Dental Soft Diet    DVT Prophylaxis: Elequis  Shearer Catheter: in place, indication: Retention  Code Status: Full Code      Disposition Plan   Expected discharge: Tomorrow, recommended to prior living arrangement once antibiotic plan established, mental status at baseline and afebrile.  Entered: Keenan Casey MD 03/03/2020, 6:06 PM       The patient's care was discussed with the Patient and Patient's Family.    Keenan Casey MD  Hospitalist Service  [facility]    ______________________________________________________________________    Interval History   History limited by mental status.  No fevers. Denies pain or shortness of breath. Asked to close curtain because he was cold. Tolerating meals.    Data reviewed today: I reviewed all medications, new labs and imaging results over the last 24 hours. I personally reviewed no images or EKG's today.    Physical Exam   Vital Signs: Temp: 98.5  F (36.9  C) Temp src: Oral BP: 106/51 Pulse: 66 Heart Rate: 65 Resp: 16 SpO2: 97 % O2 Device: None (Room air)    Weight: 154 lbs 1.62 oz  Constitutional: Awake, alert, cooperative, limited conversation ability, no apparent distress  Respiratory: Clear to auscultation bilaterally, no crackles or  wheezing  Cardiovascular: Regular rate and rhythm, normal S1 and S2, and no murmur noted  GI: Normal bowel sounds, soft, non-distended, non-tender  Skin/Integumen: No rashes, no cyanosis, no edema  Other:    Data   Recent Labs   Lab 03/03/20  0700 03/02/20  0447   WBC 5.8 5.8   HGB 10.5* 11.7*   MCV 99 100   PLT 87* 113*    139   POTASSIUM 3.7 3.7   CHLORIDE 114* 111*   CO2 21 23   BUN 13 20   CR 0.69 0.76   ANIONGAP 5 5   BREANN 7.6* 7.8*   GLC 78 96   ALBUMIN 1.9* 2.3*   PROTTOTAL 5.8* 6.9   BILITOTAL 0.3 0.2   ALKPHOS 47 58   ALT 9 10   AST 12 16   TROPI  --  <0.015     No results found for this or any previous visit (from the past 24 hour(s)).  Medications     sodium chloride 100 mL/hr at 03/02/20 1804       apixaban ANTICOAGULANT  2.5 mg Oral BID     calcium carbonate  500 mg Oral BID     finasteride  5 mg Oral Daily     glycopyrrolate  1 mg Oral TID     oseltamivir  75 mg Oral BID     perampanel  4 mg Oral At Bedtime     polyethylene glycol  17 g Oral QAM     senna-docusate  1 tablet Oral BID     simvastatin  10 mg Oral At Bedtime     sodium chloride (PF)  3 mL Intracatheter Q8H     sodium chloride (PF)  3 mL Intracatheter Q8H     valproic acid  1,000 mg Oral At Bedtime     valproic acid  500 mg Oral QAM     zonisamide  300 mg Oral QAM

## 2020-03-04 NOTE — PROGRESS NOTES
SW  I: Charleen from [facility] called to let writer know that patient can discharge tomorrow, 3/5/20 when they will have a private room available for our patient. Placed return call regarding time that [facility] can receive patient.  P: Will continue to follow.     CLARKE Camargo, [facility]  [phone]

## 2020-03-05 VITALS
HEART RATE: 60 BPM | SYSTOLIC BLOOD PRESSURE: 96 MMHG | OXYGEN SATURATION: 94 % | TEMPERATURE: 98 F | BODY MASS INDEX: 24.39 KG/M2 | RESPIRATION RATE: 16 BRPM | WEIGHT: 160 LBS | DIASTOLIC BLOOD PRESSURE: 50 MMHG

## 2020-03-05 PROCEDURE — G0463 HOSPITAL OUTPT CLINIC VISIT: HCPCS

## 2020-03-05 PROCEDURE — 99207 ZZC CDG-MDM COMPONENT: MEETS MODERATE - UP CODED: CPT | Performed by: INTERNAL MEDICINE

## 2020-03-05 PROCEDURE — 99232 SBSQ HOSP IP/OBS MODERATE 35: CPT | Performed by: INTERNAL MEDICINE

## 2020-03-05 PROCEDURE — 25000132 ZZH RX MED GY IP 250 OP 250 PS 637: Performed by: INTERNAL MEDICINE

## 2020-03-05 PROCEDURE — 25000132 ZZH RX MED GY IP 250 OP 250 PS 637: Performed by: PHYSICIAN ASSISTANT

## 2020-03-05 PROCEDURE — 25800030 ZZH RX IP 258 OP 636: Performed by: PHYSICIAN ASSISTANT

## 2020-03-05 RX ORDER — OSELTAMIVIR PHOSPHATE 75 MG/1
75 CAPSULE ORAL 2 TIMES DAILY
Status: ON HOLD | DISCHARGE
Start: 2020-03-05 | End: 2020-06-07

## 2020-03-05 RX ADMIN — GLYCOPYRROLATE 1 MG: 1 TABLET ORAL at 08:59

## 2020-03-05 RX ADMIN — APIXABAN 2.5 MG: 2.5 TABLET, FILM COATED ORAL at 08:59

## 2020-03-05 RX ADMIN — FINASTERIDE 5 MG: 5 TABLET, FILM COATED ORAL at 08:59

## 2020-03-05 RX ADMIN — MICONAZOLE NITRATE: 20 POWDER TOPICAL at 04:53

## 2020-03-05 RX ADMIN — MICONAZOLE NITRATE: 20 POWDER TOPICAL at 06:52

## 2020-03-05 RX ADMIN — ZONISAMIDE 300 MG: 100 CAPSULE ORAL at 08:58

## 2020-03-05 RX ADMIN — SODIUM CHLORIDE: 9 INJECTION, SOLUTION INTRAVENOUS at 06:53

## 2020-03-05 RX ADMIN — MICONAZOLE NITRATE: 20 POWDER TOPICAL at 00:46

## 2020-03-05 RX ADMIN — CALCIUM CARBONATE (ANTACID) CHEW TAB 500 MG 500 MG: 500 CHEW TAB at 08:59

## 2020-03-05 RX ADMIN — VALPROIC ACID 500 MG: 250 CAPSULE, LIQUID FILLED ORAL at 08:59

## 2020-03-05 RX ADMIN — OSELTAMIVIR PHOSPHATE 75 MG: 75 CAPSULE ORAL at 08:58

## 2020-03-05 ASSESSMENT — ACTIVITIES OF DAILY LIVING (ADL)
ADLS_ACUITY_SCORE: 32
ADLS_ACUITY_SCORE: 41
ADLS_ACUITY_SCORE: 33
ADLS_ACUITY_SCORE: 41

## 2020-03-05 NOTE — PROGRESS NOTES
[facility]  WO Nurse Inpatient Wound Assessment   Reason for consultation: Evaluate and treat left foot      Assessment  Left medial foot: small wound, likely an Unstagable pressure injury, is over a bony prominence.  Present on admission.  No acute s/s infection.   Left distal great toe with very small dry scabbing area, unclear etiology, pressure vs trauma.  Present on admission. Appears stable.      Treatment Plan  Left medial foot: every other day and prn:  1.  Cleanse with MicroKlenz.  2.  Swab periwound with skin prep, let dry.  3.  Apply small glob of Iodosorb gel to a foam dressing and press onto wound.  Label with time/date/initials.  4.  Ensure no pressure to area.  Float or cushion feet/heels/ankles at all times.     Left distal great toe:  Every other day:  1.  Swab with betadine wipe, let dry.  2.  Can leave open to air, or cover with dry gauze protective dressing prn.    Keep rest of feet clean and moisturized daily    Orders Written  WOC Nurse follow-up plan: weekly  Nursing to notify the Provider(s) and re-consult the WO Nurse if wound(s) deteriorates or new skin concern.    Patient History  According to provider note(s):  Josiah Toney is a 75 year old male admitted on 3/2/2020. 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      Objective Data  Containment of urine/stool: Incontinence Protocol    Active Diet Order:  Orders Placed This Encounter      Mechanical/Dental Soft Diet      Advance Diet as Tolerated    Output:   I/O last 3 completed shifts:  In: 700  [P.O.:700]  Out: 850 [Urine:850]    Risk Assessment:   Sensory Perception: 4-->no impairment  Moisture: 3-->occasionally moist  Activity: 1-->bedfast  Mobility: 2-->very limited  Nutrition: 3-->adequate  Friction and Shear: 2-->potential problem  Benji Score: 15                          Labs:   Recent Labs   Lab 03/04/20  0717 03/03/20  0700   ALBUMIN  --  1.9*   HGB 10.9* 10.5*   WBC 4.2 5.8       Physical Exam  Skin inspection: focused feet    Wound Location:  Left medial foot  Date of last photo:  3-5-20      Wound History: present on admission.  Pt with limited mobility and cognitive impairment.   Measurements (length x width x depth, in cm):  approx 0.8 x 1 x 0.2+cm   Wound Base: mix yellow and red moist tissue  Tunneling: N/A  Undermining: N/A  Palpation of the wound bed: firm   Periwound skin: intact  Color: pink  Temperature: normal   Drainage: small  Description of drainage: yellow creamy  Odor: none  Pain: minimal    Left distal great toe:   3-5-20      Approx 0.5 x 0.7 x 0.1+cm, dark red dry scabbing tissue. No active drainage.  Minimal tenderness.     Interventions  Current support surface: Standard  Atmos Air mattress  Current off-loading measures: Pillows  Visual inspection of wound(s) completed  Wound Care: done per plan of care  Supplies: reviewed, gathered and placed at the bedside  Education provided: importance of repositioning, plan of care, wound progress, Infection prevention  and Off-loading pressure  Discussed plan of care with Patient and Nurse    Promise Ellis RN

## 2020-03-05 NOTE — PROGRESS NOTES
[facility]    Medicine Progress Note - Hospitalist Service       Date of Admission:  3/2/2020  Assessment & Plan   Josiah Toney is a 75 year old male admitted on 3/2/2020. 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       Acute metabolic encephalopathy- improved  Resides at a nursing home and staff noticed patient was less responsive, change in speech and drooling.  Code stroke initiated.  Head CT and Head/Neck CTA negative for acute CVA but noted previous right MCA territory. Notably low grade fever with recent cough and history of urinary retention with UTI. Lactic WNL 0.6, Ammonia WNL 24.  Chest Xray negative.  EKG with sinus rhythm. Influenza swab was positive. UA with positive nitrites, small LE, WBC of 15 cath specimen. EEG negative for seizure. Valproic acid level 76.  --UCX pending with four organisms in isolate from suprapubic catheter that are all thought to be just colonization and not infection, no antibiotics indicated at this time  --improving with treatment of influenza  --General Neurology consult appreciated    --PT/OT/SLP and SW consulted; continue PT/OT at TCU      Influenza:  Patient with a cough that he stated has been present for a month.  Noted low grade temp in the ED. Influenza was positive. Chest Xray (1 view) negative.  --Tamiflu for 5 day course     Suprapubic catheter colonization and history of Recurrent UTI's:  Previous admission here at [facility] 1/15/2020-1/19/2020 for AMS secondary to UTI. At that time culture grew out many organisms (100K  Klebsiella, Proteus, and Enterococcus, all sensitive to Augmentin)  --UA (positive nitrites, small LE, WBC of 15 cath specimen).    --UCX pending with four organisms in isolate from suprapubic catheter that are all thought to be just colonization and not infection, no antibiotics indicated at this time  --follow up with PCP and urology for ongoing management     Chronic memory/cognitive impairment:  History of right MCA CVA with chronic left sided spastic hemiparesis (~10 years ago):   Cerebral palsy:  Appears stable. Mentation improved with treatment of influenza.     Incidental thyroid nodules:  Noted on CT scans. TSH WNL at 3.04.    --PCP to Consider Outpatient thyroid US.          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  Valproic acid level of 76. EEG negative.  --Follow Zonisamide level 20  --Neurology appreciated  --Continue PTA Depakene 500 mg every morning and 1000 mg at bedtime.  Level appropriate.  --Continue PTA Fycompa 4 mg at bedtime.    --Continue PTA Zonegran 300 mg every morning.       Thrombocytopenia:  Baseline platelet count is in the 160's. Platelets low at 113 on admission and decreased to 87-80     --likely from infection and bone marrow suppression with dilution  --repeat CBC in 1 week      History of DVT:  --Continue PTA Eliquis 2.5 mg BID and monitor platelet count closely.     Dysphagia:  Speech articulation D/O:  Last admission Speech recommended NPO with feeding tube.  After discussion with patient's brother (Jeff) it was indicated that they would want to take the risk of aspiration and allow patient to eat without restrictions. Chest Xray negative.   --Appreciate speech eval and recommendation for dysphagia diet with thickened liquids. Family is aware and wants mechanical soft diet with regular liquids.  --Will respect wishes of family and continue mechanical soft diet with regular liquids despite risk of aspiration.     BPH with  history of urinary retention s/p cystolitholapaxy of bladder and suprapubic cystotomy with tube placement (1/2017):  Follows with Urology at [facility] [place].    --Continue PTA Proscar 5 mg daily.       Mixed hyperlipidemia:  --Continue PTA simvastatin 10 mg at bedtime.       Chronic sialadenitis:  --Continue PTA Robinul 1 mg TID.       Chronic anemia:  Baseline hemoglobin between 11-12.  Stable.       Diet: Mechanical/Dental Soft Diet  Advance Diet as Tolerated    DVT Prophylaxis: Eliquis  Shearer Catheter: in place, indication: Retention  Code Status: Full Code      Disposition Plan   Expected discharge: Today, recommended to transitional care unit  Entered: Sheri Figueroa MD 03/05/2020, 10:36 AM       The patient's care was discussed with the Bedside Nurse, Care Coordinator/ and Patient.    Sheri Figueroa MD  Hospitalist Service  [facility]    ______________________________________________________________________    Interval History   Doing fine, no events overnight, denies chest pain, no SOB, no abd pain, no N/V; discussed with SW and RN    Data reviewed today: I reviewed all medications, new labs and imaging results over the last 24 hours. I personally reviewed no images or EKG's today.    Physical Exam   Vital Signs: Temp: 97.3  F (36.3  C) Temp src: Oral BP: 91/48 Pulse: 60 Heart Rate: (!) 48 Resp: 16 SpO2: 93 % O2 Device: None (Room air)    Weight: 160 lbs 0 oz     Constitutional: Awake, alert, cooperative, limited conversation ability, no apparent distress  Respiratory: Clear to auscultation bilaterally, no crackles or wheezing  Cardiovascular: Regular rate and rhythm, normal S1 and S2, and no murmur noted  GI: Normal bowel sounds, soft, non-distended, non-tender  Skin/Integumen: No rashes, no cyanosis, no edema  Other:           Data   Recent Labs   Lab 03/04/20  0717 03/03/20  0700 03/02/20  0447   WBC 4.2 5.8 5.8   HGB 10.9* 10.5* 11.7*   MCV 98 99 100   PLT 80* 87* 113*   NA   --  140 139   POTASSIUM  --  3.7 3.7   CHLORIDE  --  114* 111*   CO2  --  21 23   BUN  --  13 20   CR  --  0.69 0.76   ANIONGAP  --  5 5   BREANN  --  7.6* 7.8*   GLC  --  78 96   ALBUMIN  --  1.9* 2.3*   PROTTOTAL  --  5.8* 6.9   BILITOTAL  --  0.3 0.2   ALKPHOS  --  47 58   ALT  --  9 10   AST  --  12 16   TROPI  --   --  <0.015     No results found for this or any previous visit (from the past 24 hour(s)).  Medications       apixaban ANTICOAGULANT  2.5 mg Oral BID     calcium carbonate  500 mg Oral BID     finasteride  5 mg Oral Daily     glycopyrrolate  1 mg Oral TID     oseltamivir  75 mg Oral BID     perampanel  4 mg Oral At Bedtime     polyethylene glycol  17 g Oral QAM     senna-docusate  1 tablet Oral BID     simvastatin  10 mg Oral At Bedtime     sodium chloride (PF)  3 mL Intracatheter Q8H     sodium chloride (PF)  3 mL Intracatheter Q8H     valproic acid  1,000 mg Oral At Bedtime     valproic acid  500 mg Oral QAM     zonisamide  300 mg Oral QAM

## 2020-03-05 NOTE — DISCHARGE INSTRUCTIONS
[facility]  WOC Nurse Inpatient Wound Assessment   Reason for consultation: Evaluate and treat left foot       Assessment  Left medial foot: small wound, likely an Unstagable pressure injury, is over a bony prominence.  Present on admission.  No acute s/s infection.   Left distal great toe with very small dry scabbing area, unclear etiology, pressure vs trauma.  Present on admission. Appears stable.       Treatment Plan  Left medial foot: every other day and prn:  1.  Cleanse with MicroKlenz.  2.  Swab periwound with skin prep, let dry.  3.  Apply small glob of Iodosorb gel to a foam dressing and press onto wound.  Label with time/date/initials.  4.  Ensure no pressure to area.  Float or cushion feet/heels/ankles at all times.      Left distal great toe:  Every other day:  1.  Swab with betadine wipe, let dry.  2.  Can leave open to air, or cover with dry gauze protective dressing prn.     Keep rest of feet clean and moisturized daily     Orders Written  WOC Nurse follow-up plan: weekly  Nursing to notify the Provider(s) and re-consult the WOC Nurse if wound(s) deteriorates or new skin concern.

## 2020-03-05 NOTE — PLAN OF CARE
Patient is discharging back to his nursing home [facility] today via stretcher at 1400.  Patient ate lunch at 1230pm.  Incontinent brief changed at 1:45pm.  Discharge packet ready for transport.

## 2020-03-05 NOTE — PROGRESS NOTES
Writer called and spoke to Patient's brother Jeff Toney on the phone to update that he will be discharging today at 1400 back to his nursing home and he was ok with that plan.

## 2020-03-05 NOTE — CONSULTS
SW  I: Patient to discharge to [facility] via [facility] stretcher ride through insurance on 3/5/20 at 14:00. Charleen at [facility] notified of patients discharge time.   P: Will continue to follow.     CLARKE Camargo, [facility]  [phone]

## 2020-03-05 NOTE — PLAN OF CARE
VSS ex. Bradycardic on RA. A&Ox3, disoriented to situation. Slurred speech, slow to respond.  Left side facial droop and left side body weakness due to previous stroke, BLE unable to preform Heel to shin exercise. Turn and repositioned every 2 hours.  Bilateral buttocks blanchable red/purple areas, mepilex applied.  Supra pubic catheter patent, dressing change. Pt. Still voids. Incontinent of B&B, 1 BM this shift.  PIV infusing.  Denies pain. Droplet and contact precautions maintained.  Plan to discharge back to LTC today . Continue to monitor.

## 2020-03-08 LAB
BACTERIA SPEC CULT: NO GROWTH
BACTERIA SPEC CULT: NO GROWTH
SPECIMEN SOURCE: NORMAL
SPECIMEN SOURCE: NORMAL

## 2020-03-09 ENCOUNTER — DOCUMENTATION ONLY (OUTPATIENT)
Dept: OTHER | Facility: CLINIC | Age: 75
End: 2020-03-09

## 2020-06-07 ENCOUNTER — HOSPITAL ENCOUNTER (INPATIENT)
Facility: CLINIC | Age: 75
LOS: 3 days | Discharge: NURSING, REHAB OR LONG-TERM CARE | DRG: 699 | End: 2020-06-10
Attending: PHYSICIAN ASSISTANT | Admitting: INTERNAL MEDICINE
Payer: COMMERCIAL

## 2020-06-07 ENCOUNTER — APPOINTMENT (OUTPATIENT)
Dept: CT IMAGING | Facility: CLINIC | Age: 75
DRG: 699 | End: 2020-06-07
Attending: PHYSICIAN ASSISTANT
Payer: COMMERCIAL

## 2020-06-07 DIAGNOSIS — N32.89 BLADDER HEMORRHAGE: ICD-10-CM

## 2020-06-07 DIAGNOSIS — S90.812S ABRASION OF LEFT FOOT, SEQUELA: Primary | ICD-10-CM

## 2020-06-07 DIAGNOSIS — Z93.59 SUPRAPUBIC CATHETER (H): ICD-10-CM

## 2020-06-07 DIAGNOSIS — R31.9 HEMATURIA: ICD-10-CM

## 2020-06-07 LAB
ANION GAP SERPL CALCULATED.3IONS-SCNC: 4 MMOL/L (ref 3–14)
BASOPHILS # BLD AUTO: 0 10E9/L (ref 0–0.2)
BASOPHILS NFR BLD AUTO: 0.1 %
BUN SERPL-MCNC: 21 MG/DL (ref 7–30)
CALCIUM SERPL-MCNC: 8.6 MG/DL (ref 8.5–10.1)
CHLORIDE SERPL-SCNC: 111 MMOL/L (ref 94–109)
CO2 SERPL-SCNC: 26 MMOL/L (ref 20–32)
CREAT SERPL-MCNC: 0.86 MG/DL (ref 0.66–1.25)
DIFFERENTIAL METHOD BLD: ABNORMAL
EOSINOPHIL # BLD AUTO: 0 10E9/L (ref 0–0.7)
EOSINOPHIL NFR BLD AUTO: 0.1 %
ERYTHROCYTE [DISTWIDTH] IN BLOOD BY AUTOMATED COUNT: 14.7 % (ref 10–15)
GFR SERPL CREATININE-BSD FRML MDRD: 86 ML/MIN/{1.73_M2}
GLUCOSE SERPL-MCNC: 104 MG/DL (ref 70–99)
HCT VFR BLD AUTO: 38.1 % (ref 40–53)
HGB BLD-MCNC: 12.4 G/DL (ref 13.3–17.7)
IMM GRANULOCYTES # BLD: 0 10E9/L (ref 0–0.4)
IMM GRANULOCYTES NFR BLD: 0.3 %
LYMPHOCYTES # BLD AUTO: 1.1 10E9/L (ref 0.8–5.3)
LYMPHOCYTES NFR BLD AUTO: 8 %
MCH RBC QN AUTO: 32.1 PG (ref 26.5–33)
MCHC RBC AUTO-ENTMCNC: 32.5 G/DL (ref 31.5–36.5)
MCV RBC AUTO: 99 FL (ref 78–100)
MONOCYTES # BLD AUTO: 0.9 10E9/L (ref 0–1.3)
MONOCYTES NFR BLD AUTO: 6.2 %
NEUTROPHILS # BLD AUTO: 11.8 10E9/L (ref 1.6–8.3)
NEUTROPHILS NFR BLD AUTO: 85.3 %
NRBC # BLD AUTO: 0 10*3/UL
NRBC BLD AUTO-RTO: 0 /100
PLATELET # BLD AUTO: 152 10E9/L (ref 150–450)
POTASSIUM SERPL-SCNC: 3.9 MMOL/L (ref 3.4–5.3)
RBC # BLD AUTO: 3.86 10E12/L (ref 4.4–5.9)
SODIUM SERPL-SCNC: 141 MMOL/L (ref 133–144)
WBC # BLD AUTO: 13.8 10E9/L (ref 4–11)

## 2020-06-07 PROCEDURE — 99223 1ST HOSP IP/OBS HIGH 75: CPT | Mod: AI | Performed by: INTERNAL MEDICINE

## 2020-06-07 PROCEDURE — 25000125 ZZHC RX 250: Performed by: PHYSICIAN ASSISTANT

## 2020-06-07 PROCEDURE — 80048 BASIC METABOLIC PNL TOTAL CA: CPT | Performed by: PHYSICIAN ASSISTANT

## 2020-06-07 PROCEDURE — U0003 INFECTIOUS AGENT DETECTION BY NUCLEIC ACID (DNA OR RNA); SEVERE ACUTE RESPIRATORY SYNDROME CORONAVIRUS 2 (SARS-COV-2) (CORONAVIRUS DISEASE [COVID-19]), AMPLIFIED PROBE TECHNIQUE, MAKING USE OF HIGH THROUGHPUT TECHNOLOGIES AS DESCRIBED BY CMS-2020-01-R: HCPCS | Performed by: PHYSICIAN ASSISTANT

## 2020-06-07 PROCEDURE — 25800025 ZZH RX 258: Performed by: INTERNAL MEDICINE

## 2020-06-07 PROCEDURE — 25000132 ZZH RX MED GY IP 250 OP 250 PS 637: Performed by: INTERNAL MEDICINE

## 2020-06-07 PROCEDURE — C9803 HOPD COVID-19 SPEC COLLECT: HCPCS

## 2020-06-07 PROCEDURE — 51702 INSERT TEMP BLADDER CATH: CPT

## 2020-06-07 PROCEDURE — 25000128 H RX IP 250 OP 636: Performed by: PHYSICIAN ASSISTANT

## 2020-06-07 PROCEDURE — 12000000 ZZH R&B MED SURG/OB

## 2020-06-07 PROCEDURE — 85025 COMPLETE CBC W/AUTO DIFF WBC: CPT | Performed by: PHYSICIAN ASSISTANT

## 2020-06-07 PROCEDURE — 74177 CT ABD & PELVIS W/CONTRAST: CPT

## 2020-06-07 PROCEDURE — 99285 EMERGENCY DEPT VISIT HI MDM: CPT | Mod: 25

## 2020-06-07 RX ORDER — AMOXICILLIN 250 MG
1 CAPSULE ORAL 2 TIMES DAILY
Status: DISCONTINUED | OUTPATIENT
Start: 2020-06-07 | End: 2020-06-10 | Stop reason: HOSPADM

## 2020-06-07 RX ORDER — POLYETHYLENE GLYCOL 3350 17 G/17G
17 POWDER, FOR SOLUTION ORAL EVERY MORNING
Status: DISCONTINUED | OUTPATIENT
Start: 2020-06-07 | End: 2020-06-10 | Stop reason: HOSPADM

## 2020-06-07 RX ORDER — PROCHLORPERAZINE MALEATE 5 MG
5 TABLET ORAL EVERY 6 HOURS PRN
Status: DISCONTINUED | OUTPATIENT
Start: 2020-06-07 | End: 2020-06-10 | Stop reason: HOSPADM

## 2020-06-07 RX ORDER — GLYCOPYRROLATE 1 MG/1
1 TABLET ORAL 3 TIMES DAILY
Status: DISCONTINUED | OUTPATIENT
Start: 2020-06-07 | End: 2020-06-10 | Stop reason: HOSPADM

## 2020-06-07 RX ORDER — PROCHLORPERAZINE 25 MG
12.5 SUPPOSITORY, RECTAL RECTAL EVERY 12 HOURS PRN
Status: DISCONTINUED | OUTPATIENT
Start: 2020-06-07 | End: 2020-06-10 | Stop reason: HOSPADM

## 2020-06-07 RX ORDER — BISACODYL 10 MG
10 SUPPOSITORY, RECTAL RECTAL DAILY PRN
Status: DISCONTINUED | OUTPATIENT
Start: 2020-06-07 | End: 2020-06-10 | Stop reason: HOSPADM

## 2020-06-07 RX ORDER — VALPROIC ACID 250 MG/1
500 CAPSULE, LIQUID FILLED ORAL EVERY MORNING
Status: DISCONTINUED | OUTPATIENT
Start: 2020-06-08 | End: 2020-06-10 | Stop reason: HOSPADM

## 2020-06-07 RX ORDER — ACETAMINOPHEN 650 MG/1
650 SUPPOSITORY RECTAL EVERY 4 HOURS PRN
Status: DISCONTINUED | OUTPATIENT
Start: 2020-06-07 | End: 2020-06-10 | Stop reason: HOSPADM

## 2020-06-07 RX ORDER — HYDROMORPHONE HYDROCHLORIDE 1 MG/ML
0.2 INJECTION, SOLUTION INTRAMUSCULAR; INTRAVENOUS; SUBCUTANEOUS
Status: DISCONTINUED | OUTPATIENT
Start: 2020-06-07 | End: 2020-06-10 | Stop reason: HOSPADM

## 2020-06-07 RX ORDER — OXYBUTYNIN CHLORIDE 5 MG/1
5 TABLET ORAL 2 TIMES DAILY
COMMUNITY

## 2020-06-07 RX ORDER — POLYETHYLENE GLYCOL 3350 17 G/17G
17 POWDER, FOR SOLUTION ORAL DAILY PRN
Status: DISCONTINUED | OUTPATIENT
Start: 2020-06-07 | End: 2020-06-10 | Stop reason: HOSPADM

## 2020-06-07 RX ORDER — IOPAMIDOL 755 MG/ML
81 INJECTION, SOLUTION INTRAVASCULAR ONCE
Status: COMPLETED | OUTPATIENT
Start: 2020-06-07 | End: 2020-06-07

## 2020-06-07 RX ORDER — ACETAMINOPHEN 325 MG/1
650 TABLET ORAL EVERY 4 HOURS PRN
Status: DISCONTINUED | OUTPATIENT
Start: 2020-06-07 | End: 2020-06-10 | Stop reason: HOSPADM

## 2020-06-07 RX ORDER — ONDANSETRON 2 MG/ML
4 INJECTION INTRAMUSCULAR; INTRAVENOUS EVERY 6 HOURS PRN
Status: DISCONTINUED | OUTPATIENT
Start: 2020-06-07 | End: 2020-06-10 | Stop reason: HOSPADM

## 2020-06-07 RX ORDER — NALOXONE HYDROCHLORIDE 0.4 MG/ML
.1-.4 INJECTION, SOLUTION INTRAMUSCULAR; INTRAVENOUS; SUBCUTANEOUS
Status: DISCONTINUED | OUTPATIENT
Start: 2020-06-07 | End: 2020-06-10 | Stop reason: HOSPADM

## 2020-06-07 RX ORDER — VALPROIC ACID 250 MG/1
1000 CAPSULE, LIQUID FILLED ORAL AT BEDTIME
Status: DISCONTINUED | OUTPATIENT
Start: 2020-06-07 | End: 2020-06-10 | Stop reason: HOSPADM

## 2020-06-07 RX ORDER — CALCIUM CARBONATE 500 MG/1
500 TABLET, CHEWABLE ORAL 2 TIMES DAILY PRN
Status: DISCONTINUED | OUTPATIENT
Start: 2020-06-07 | End: 2020-06-10 | Stop reason: HOSPADM

## 2020-06-07 RX ORDER — LIDOCAINE 40 MG/G
CREAM TOPICAL
Status: DISCONTINUED | OUTPATIENT
Start: 2020-06-07 | End: 2020-06-10 | Stop reason: HOSPADM

## 2020-06-07 RX ORDER — SIMVASTATIN 10 MG
10 TABLET ORAL AT BEDTIME
Status: DISCONTINUED | OUTPATIENT
Start: 2020-06-07 | End: 2020-06-10 | Stop reason: HOSPADM

## 2020-06-07 RX ORDER — FINASTERIDE 5 MG/1
5 TABLET, FILM COATED ORAL DAILY
Status: DISCONTINUED | OUTPATIENT
Start: 2020-06-07 | End: 2020-06-10 | Stop reason: HOSPADM

## 2020-06-07 RX ORDER — ZONISAMIDE 100 MG/1
300 CAPSULE ORAL EVERY MORNING
Status: DISCONTINUED | OUTPATIENT
Start: 2020-06-07 | End: 2020-06-10 | Stop reason: HOSPADM

## 2020-06-07 RX ORDER — ONDANSETRON 4 MG/1
4 TABLET, ORALLY DISINTEGRATING ORAL EVERY 6 HOURS PRN
Status: DISCONTINUED | OUTPATIENT
Start: 2020-06-07 | End: 2020-06-10 | Stop reason: HOSPADM

## 2020-06-07 RX ADMIN — SODIUM CHLORIDE: 900 IRRIGANT IRRIGATION at 21:48

## 2020-06-07 RX ADMIN — SODIUM CHLORIDE: 900 IRRIGANT IRRIGATION at 23:30

## 2020-06-07 RX ADMIN — POLYETHYLENE GLYCOL 3350 17 G: 17 POWDER, FOR SOLUTION ORAL at 19:03

## 2020-06-07 RX ADMIN — VALPROIC ACID 1000 MG: 250 CAPSULE, LIQUID FILLED ORAL at 21:47

## 2020-06-07 RX ADMIN — SODIUM CHLORIDE 63 ML: 9 INJECTION, SOLUTION INTRAVENOUS at 12:30

## 2020-06-07 RX ADMIN — SODIUM CHLORIDE: 900 IRRIGANT IRRIGATION at 18:23

## 2020-06-07 RX ADMIN — IOPAMIDOL 81 ML: 755 INJECTION, SOLUTION INTRAVENOUS at 12:29

## 2020-06-07 RX ADMIN — SIMVASTATIN 10 MG: 10 TABLET, FILM COATED ORAL at 21:47

## 2020-06-07 RX ADMIN — GLYCOPYRROLATE 1 MG: 1 TABLET ORAL at 21:47

## 2020-06-07 RX ADMIN — SODIUM CHLORIDE: 900 IRRIGANT IRRIGATION at 20:48

## 2020-06-07 RX ADMIN — FINASTERIDE 5 MG: 5 TABLET, FILM COATED ORAL at 19:01

## 2020-06-07 RX ADMIN — ZONISAMIDE 300 MG: 100 CAPSULE ORAL at 19:01

## 2020-06-07 ASSESSMENT — ENCOUNTER SYMPTOMS
ABDOMINAL PAIN: 1
FEVER: 0
HEMATURIA: 1

## 2020-06-07 ASSESSMENT — ACTIVITIES OF DAILY LIVING (ADL): ADLS_ACUITY_SCORE: 39

## 2020-06-07 NOTE — ED PROVIDER NOTES
Emergency Department Attending Supervision Note  6/7/2020  4:10 PM      I evaluated this patient in conjunction with Shakira Doyle PA-C.      Briefly, the patient presented with hematuria.  The patient is on Eliquis and stays at a home where he is cared for for his past history of stroke.  He has a suprapubic catheter which was changed out yesterday.  Since changing this out, he has had significant bloody drainage from the catheter, now with passage of bloody urine from his penis.  With these issues, he was sent to us for evaluation.  The patient denied having any significant pain with this during my history, though history is significantly limited due to the patient's partial aphasia.      On my exam, he is a thin elderly gentleman resting supine in bed.  Cardiac: Regular rate and rhythm  Lungs: Clear to auscultation bilaterally  Abd: Soft and nontender.  Suprapubic is seen in the lower abdomen with bloody urine.  : Circumcised male.  There is bloody drainage coming from the penis.      Results: I reviewed this patient's evaluation which was pursued by the physician assistant, including a stable hemoglobin and appropriate kidney function.  CT of the abdomen was done which showed a moderate amount of hemorrhage within the bladder though with an appropriate placement of the suprapubic catheter.        My impression is hematuria in an anticoagulated patient after changing out a suprapubic catheter.  The patient had significant obstruction from large clots which were removed through placing a three-way catheter and placing him on consistent irrigation.  He no longer has any significant output from the penis and the clots and blood are clearing.  However, he will likely require ongoing irrigation overnight along with consultation with urology for a possible cystoscopy to rule out any significant laceration or other source of bleeding considering that he is otherwise anticoagulated.  Patient is comfortable with  this plan for admission.  My physician assistant spoke with Dr. Chan of the hospitalist service who will admit the patient.        Diagnosis    ICD-10-CM    1. Hematuria  R31.9    2. Bladder hemorrhage  N32.89    3. Suprapubic catheter (H)  Z93.59           Trierweiler, Chad A, MD  06/07/20 2012

## 2020-06-07 NOTE — ED PROVIDER NOTES
History     Chief Complaint:  Hematuria    HPI   Josiah Toney is a 75 year old male that is on Eliquis for DVT, BPH for urinary retention, and history from 2017 of S/P cystolithopaxy of bladder and suprapubic cystotomy with catheter placement presents from the care facility via EMS to the ED for hematuria.  The patient had his suprapubic catheter replaced yesterday by the nursing at his care facility, and nursing care reports no complications but then in the morning the patient having hematuria.  The nursing care also reports that the patient did not takes his Eliquis in the morning. The patient goes to the urology at [facility] with Dr. Quintin Alexis.  The patient denies fever or other symptoms.  The patient admits to some abdominal pain.     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 No pertinent family history.     Social History:  Smoking status: No  Alcohol use: Not currently  PCP: YOSHI GAFFNEY  Presents to the ED by himself  Marital Status:  Single [1]     Review of Systems   Constitutional: Negative for fever.   Gastrointestinal: Positive for abdominal pain.   Genitourinary: Positive for decreased urine volume and hematuria.   All other systems reviewed and are negative.    Physical Exam     Patient Vitals for the past 24 hrs:   BP Temp Temp src Pulse Resp SpO2   06/07/20 1540 124/79 -- -- 78 18 96 %   06/07/20 1147 -- 98.6  F (37  C) Oral -- -- --       Physical Exam   General: Resting on the bed.  Suprapubic  catheter in place.  Skin: Good turgor, no rash, no unusual bruising or prominent lesions.  HEENT: Head: Normocephalic, atraumatic, no visible masses.   Eyes: Conjunctiva clear.  Cardiac: Normal rate and regular rhythm, no murmur or gallop.   Lungs: Clear to auscultation.  Abdomen: Suprapubic catheter in place.  No bleeding surrounding the catheter site.  Mild amount of tenderness throughout this region surrounding the catheter.  Musculoskeletal: Normal gait and station.   Neurologic: Alert and oriented.  Able to answer yes and no questions.  Difficulty with complex responses.  : Hypospadias.  Mild amount of bloody drainage from tip of penis.    Emergency Department Course     Imaging:  Radiology findings were communicated with the patient who voiced understanding of the findings.    CT Abd/pelvis with contrast:  IMPRESSION: Moderate amount of hemorrhage within the bladder. No   active extravasation.     Imaging independently reviewed and agree with radiologist interpretation.     Laboratory:  Laboratory findings were communicated with the patient who voiced understanding of the findings.    CBC: WBC 13.8 (H), HGB 12.4 (L), o/w WNL ()    BMP: chloride 111 (H), glucose 104 (H), o/w WNL (Creatinine 0.86)     COVID-19 swab for PCR: pending    Procedures  None     Interventions:  1230: Saline 63 mL  Continuous Bladder Irrigation initiated     Emergency Department Course:  Past medical records, nursing notes, and vitals reviewed.    1124 I performed an exam of the patient as documented above.     EKG obtained in the ED, see results above.   IV was inserted and blood was drawn for laboratory testing, results above.  The patient was sent for a CT while in the emergency department, results above.     I consulted with Dr. Hogan of [facility] urology.    I reevaluated the patient.    I reevaluate the patient.    I rechecked the patient and discussed the results of his workup thus far.     I initiated    I reevaluate the  patient.    I discussed the patient's case with Dr. hCan.     Findings and plan explained to the Patient who consents to admission. Discussed the patient with Dr. Chan, who will admit the patient to a adult med/surg bed for further monitoring, evaluation, and treatment.    I personally reviewed the laboratory and imaging results with the Patient and answered all related questions prior to admission.     Impression & Plan     Medical Decision Making:  Josiah Toney is a 75 year old male who presented the ED today for evaluation of hematuria with suprapubic catheter.  Details of the patient's history can be noted in the HPI.  On my exam, patient had suprapubic catheter in place.  There was bright red blood/scabbing coloration output from the tip of penis as well as in the catheter bag.  With mild abdominal tenderness, I did obtain a CT scan, which showed mild hemorrhage in the bladder.  Patient's labs are otherwise stable, normal hemoglobin.  Suspect local trauma from suprapubic catheter placement yesterday.  I touch base with his urology group, who recommended increase in the size of the catheter, initiating irrigation.  A 20 Arabic suprapubic was replaced here and CBI was initiated.  Despite this, patient continued to have bloody drainage output.  At this point, given his ongoing symptoms, I do feel that he warrants admission for urology consultation and continuous bladder irrigation.  I discussed this case with Dr. Chan who agreed to admit the patient into the hospital for further management and care.  Per chart review, patient does appear to be his own medical decision maker. He was in agreement with admission.  All questions answered.    Diagnosis:    ICD-10-CM    1. Hematuria  R31.9 Asymptomatic COVID-19 Virus (Coronavirus) by PCR   2. Bladder hemorrhage  N32.89    3. Suprapubic catheter (H)  Z93.59        Disposition:  Admitted to inpatient bed     Scribe Disclosure:  HECTOR, Wilder Castillo, am serving as a scribe  at 11:24 AM on 6/7/2020 to document services personally performed by Massiel Doyle PA based on my observations and the provider's statements to me.     This was created at least in part with a voice recognition software. Mistakes/typos may be present.      Massiel Doyle PA  06/07/20 0789

## 2020-06-07 NOTE — ED TRIAGE NOTES
Pt coming from care faculty; pt had suprapubic catheter replaced yesterday; currently donavon blood coming from penis and through the catheter

## 2020-06-07 NOTE — H&P
[facility]  History and Physical   Hospitalist  Marva Chan MD       Josiah Toney MRN# [id number]   YOB: 1945 Age: 75 year old      Date of Admission:  6/7/2020         Assessment and Plan:   Josiah Toney is a 75 year old male with prior history of stroke, cerebral palsy, belted myoclonic epilepsy, history of BPH with cystlitholapaxy of bladder with urinary retention status post suprapubic catheter placement since 2017, history of DVT on anticoagulation with Eliquis, chronic depression, hypertension, dyslipidemia was sent from his long-term care facility [facility] with hematuria.  Patient underwent his suprapubic catheter exchange by the nursing staff at his long-term care facility yesterday.  They noticed him to have hematuria since this morning      Hematuria secondary to trauma from suprapubic Shearer catheter exchange and secondary to anticoagulation with Eliquis due to history of DVT as well as stroke with PFO;  Asymptomatic COVID testing ordered to help with discharge planning. Risk is low   CT scan of the abdomen pelvis with contrast on admission showed moderate amount amount of blood hemorrhage within the bladder.  No active extravasation  Admit him to the hospital  Chronic mild thrombocytopenia indicating of Eliquis might be contributing to the hematuria as well  Hold Eliquis for now.  Once hematuria resolves we will plan on restarting Eliquis  His platelet count is stable at 152 today.  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  --  -Continue PTA meds once reviewed by pharmacy     History of DVT:  --Continue PTA Eliquis 2.5 mg BID and monitor platelet count closely.     Dysphagia:  Speech articulation D/O:  Last admission Speech recommended NPO with feeding tube.  After discussion with patient's brother (Jeff) it was indicated that they would want to take the risk of aspiration and allow patient to eat without restrictions. Chest Xray negative.   --Appreciate speech eval and recommendation for dysphagia diet with thickened liquids. Family is aware and wants mechanical soft diet with regular liquids.  --Will respect wishes of family and continue mechanical soft diet with regular liquids despite risk of aspiration.     BPH with history of urinary retention s/p cystolitholapaxy of bladder and suprapubic cystotomy with tube placement (1/2017):  Follows with Urology at [facility].    --Continue PTA Proscar 5 mg daily.       Mixed hyperlipidemia:  --Continue PTA simvastatin 10 mg at bedtime.       Chronic sialadenitis:  --Continue PTA Robinul 1 mg TID.           Diet: Mechanical/Dental Soft Diet  Advance Diet as Tolerated    DVT Prophylaxis: Eliquis  Villalta Catheter: chronic suprapubic villalta in place, indication: Retention  Code Status: Full Code        Aleks as inpatient            Code Status:   Full Code         Primary Care Physician:   Shania Wyatt [phone]         Chief Complaint:   Hematuria S/p suprapubic cathter exchange while on elliquis     History is obtained from the patient         History of Present Illness:   Josiah Toney is a 75 year old male with prior history of stroke, cerebral palsy, belted myoclonic epilepsy, history of BPH with cystlitholapaxy of bladder with urinary retention status post suprapubic catheter placement since 2017, history of DVT on anticoagulation with Eliquis, chronic  depression, hypertension, dyslipidemia was sent from his long-term care facility [facility] with hematuria.  Patient underwent his suprapubic catheter exchange by the nursing staff at his long-term care facility yesterday.  They noticed him to have hematuria since this morning.  He denies any pain associated with the hematuria.  No fevers or chills nausea vomiting no other concerns.  In the emergency room he was evaluated by JADYN Merlos.  Humberto hematuria from his penis was noted.  Urology was called recommended upgrading his catheter size and three-way catheter placement.  Three-way catheter placed and he was started on irrigation and CBI.  He has not taken his Eliquis this morning.  Request a hospitalization was made due to ongoing hematuria.  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 (H)      Cerebral palsy (H)      Depression      DVT (deep venous thrombosis) (H) on chronic anticoagulation with Eliquis      Dyslipidemia      Hypertension      Nephrolithiasis      PFO (patent foramen ovale)      Unspecified cerebral artery occlusion with cerebral infarction 10/10     Urinary retention                Past Surgical History:     Past Surgical History:   Procedure Laterality Date     ------------OTHER-------------      Cystolithopaxy and suprapubic cystotomy with catheter placement     LITHOTRIPSY  2001              Home Medications:     Prior to Admission medications    Medication Sig Last Dose Taking? Auth Provider   acetaminophen (TYLENOL) 325 MG tablet Take 650 mg by mouth every 6 hours as needed for mild pain prn Yes Unknown, Entered By History   apixaban ANTICOAGULANT (ELIQUIS) 2.5 MG tablet Take 2.5 mg by mouth 2 times daily Give twice daily between 7852-7918 and [id number] 6/6/2020 at 2000 Yes Unknown, Entered By History   calcium carbonate (TUMS) 500 MG chewable tablet Take 1 chew  tab by mouth 2 times daily as needed  prn Yes Unknown, Entered By History   finasteride (PROSCAR) 5 MG tablet Take 5 mg by mouth daily Give between 8658-4625 6/6/2020 at 1000 Yes Unknown, Entered By History   glycopyrrolate (ROBINUL) 1 MG tablet Take 1 mg by mouth 3 times daily Give three times daily between 5460-8209, 7110-1644, and 4031-2831 6/6/2020 at 2300 Yes Reported, Patient   oxybutynin (DITROPAN) 5 MG tablet Take 5 mg by mouth 2 times daily Give twice daily between 9885-1349 and [id number] 6/6/2020 at 2000 Yes Unknown, Entered By History   perampanel (FYCOMPA) 4 MG tablet Take 1 tablet (4 mg) by mouth At Bedtime 6/6/2020 at 2300 Yes Ryan Reyes PA-C   polyethylene glycol (MIRALAX) powder Take 17 g by mouth every morning Mix in 8 oz of liquid. Give between 2325-8854 6/6/2020 at 1000 Yes Reported, Patient   senna-docusate (SENOKOT-S/PERICOLACE) 8.6-50 MG tablet Take 1 tablet by mouth 2 times daily Give twice daily between 4391-4954 and 9359-2298 6/6/2020 at 2000 Yes Unknown, Entered By History   simvastatin (ZOCOR) 10 MG tablet Take 10 mg by mouth At Bedtime 6/6/2020 at 2300 Yes Unknown, Entered By History   valproic acid (DEPAKENE) 250 MG capsule Take 500 mg by mouth every morning Give between 2171-3380 6/6/2020 at 1000 Yes Unknown, Entered By History   valproic acid (DEPAKENE) 250 MG capsule Take 1,000 mg by mouth At Bedtime 6/6/2020 at 2300 Yes Unknown, Entered By History   zonisamide (ZONEGRAN) 100 MG capsule Take 300 mg by mouth every morning Give between 1860-3842 6/6/2020 at 1000 Yes Unknown, Entered By History            Allergies:   No Known Allergies         Social History:     Social History     Tobacco Use     Smoking status: Never Smoker     Smokeless tobacco: Never Used   Substance Use Topics     Alcohol use: Not Currently     Is a long-term resident of [facility].      I have reviewed this patient's social history and updated it with pertinent information if  needed. Josiah Toney  reports that he has never smoked. He has never used smokeless tobacco. He reports previous alcohol use         Family History:   Family history is reviewed and noncontributory             Review of Systems:       The 10 point Review of Systems is negative other than noted in the HPI           Physical Exam:   Blood pressure 124/79, pulse 78, temperature 98.6  F (37  C), temperature source Oral, resp. rate 18, SpO2 96 %.  0 lbs 0 oz    Constitutional: Alert, awake not in any distress   Psych: Mood and affect are normal    Neuro:  Extraocular muscles intact, left-sided contractions and left upper extremity   Eyes: No conjunctival injection, No scleral icterus, PERRLA   ENT: Ear, nose , throat are normal. Mucous membranes moist         Cardiovascular:  Regular S1-S2, no murmur   Lungs:  Clear to auscultation no rales rhonchi or wheezing   Abdomen:  Soft, nontender, nondistended, bowel sounds positive   Skin:  Warm and dry   Musculoskeletal:  Bilateral lower extremities in Elvin boots.  No joint erythema noted   Other:  Three-way Shearer with hematuria noted          Data:   All new lab and imaging data was reviewed.   Recent Labs   Lab Test 06/07/20  1136 03/04/20  0717   WBC 13.8* 4.2   HGB 12.4* 10.9*   MCV 99 98    80*      Recent Labs   Lab Test 06/07/20  1136 03/03/20  0700    140   POTASSIUM 3.9 3.7   CHLORIDE 111* 114*   CO2 26 21   BUN 21 13   CR 0.86 0.69   ANIONGAP 4 5   BREANN 8.6 7.6*   * 78     Recent Labs   Lab Test 03/02/20  0447   TROPI <0.015

## 2020-06-07 NOTE — PHARMACY-ADMISSION MEDICATION HISTORY
Pharmacy Medication History  Admission medication history interview status for the 6/7/2020  admission is complete. See EPIC admission navigator for prior to admission medications     Medication history sources: [facility]cript[facility] and [facility]  Medication history source reliability: Good  Adherence assessment: Good    Significant changes made to the medication list:  Added: oxybutynin per MAR  Removed: multivitamin       Additional medication history information:   The MAR indicates a range of times for drug administration - I called Maurice to confirm ranges. I selected the time at the end of the range. Morning administration range: 8845-6460. Evening administration range: 5865-3192. Bedtime administration range: 0798-9335.     Medication reconciliation completed by provider prior to medication history? No    Time spent in this activity: 20 minutes      Prior to Admission medications    Medication Sig Last Dose Taking? Auth Provider   acetaminophen (TYLENOL) 325 MG tablet Take 650 mg by mouth every 6 hours as needed for mild pain prn Yes Unknown, Entered By History   apixaban ANTICOAGULANT (ELIQUIS) 2.5 MG tablet Take 2.5 mg by mouth 2 times daily Give twice daily between 8287-6838 and [id number] 6/6/2020 at 2000 Yes Unknown, Entered By History   calcium carbonate (TUMS) 500 MG chewable tablet Take 1 chew tab by mouth 2 times daily as needed  prn Yes Unknown, Entered By History   finasteride (PROSCAR) 5 MG tablet Take 5 mg by mouth daily Give between 3416-9278 6/6/2020 at 1000 Yes Unknown, Entered By History   glycopyrrolate (ROBINUL) 1 MG tablet Take 1 mg by mouth 3 times daily Give three times daily between 2677-5658, 6843-6319, and 4239-1974 6/6/2020 at 2300 Yes Reported, Patient   oxybutynin (DITROPAN) 5 MG tablet Take 5 mg by mouth 2 times daily Give twice daily between 6186-0414 and [id number] 6/6/2020 at 2000 Yes Unknown, Entered By History   perampanel (FYCOMPA) 4 MG tablet Take 1 tablet (4 mg) by  mouth At Bedtime 6/6/2020 at 2300 Yes Ryan Reyes PA-C   polyethylene glycol (MIRALAX) powder Take 17 g by mouth every morning Mix in 8 oz of liquid. Give between 4441-6266 6/6/2020 at 1000 Yes Reported, Patient   senna-docusate (SENOKOT-S/PERICOLACE) 8.6-50 MG tablet Take 1 tablet by mouth 2 times daily Give twice daily between 3258-7075 and 1124-8824 6/6/2020 at 2000 Yes Unknown, Entered By History   simvastatin (ZOCOR) 10 MG tablet Take 10 mg by mouth At Bedtime 6/6/2020 at 2300 Yes Unknown, Entered By History   valproic acid (DEPAKENE) 250 MG capsule Take 500 mg by mouth every morning Give between 5493-0154 6/6/2020 at 1000 Yes Unknown, Entered By History   valproic acid (DEPAKENE) 250 MG capsule Take 1,000 mg by mouth At Bedtime 6/6/2020 at 2300 Yes Unknown, Entered By History   zonisamide (ZONEGRAN) 100 MG capsule Take 300 mg by mouth every morning Give between 9669-0027 6/6/2020 at 1000 Yes Unknown, Entered By History

## 2020-06-07 NOTE — ED NOTES
[facility]  ED Nurse Handoff Report    ED Chief complaint: Hematuria      ED Diagnosis:   Final diagnoses:   Hematuria   Bladder hemorrhage   Suprapubic catheter (H)       Code Status: to be discussed with admitting hospitalist    Allergies: No Known Allergies    Patient Story: Bleeding from suprapubic catheter  Focused Assessment:  Alert, confused, cooperative, bloody urine/discharge from suprapubic catheter    Treatments and/or interventions provided: suprapubic catheter replaced, bladder irrigation performed, labs, imaging  Patient's response to treatments and/or interventions: irrigation fluids appear to be backing up/coming out of urethra. Otherwise tolerating treatments, will continue to monitor until admit    To be done/followed up on inpatient unit:  NA    Does this patient have any cognitive concerns?: Disoriented to time, Disoriented to place and Disoriented to situation    Activity level - Baseline/Home:  unknown  Activity Level - Current:   Total Care    Patient's Preferred language: English   Needed?: No    Isolation: None  Infection: Not Applicable  Bariatric?: No    Vital Signs:   Vitals:    06/07/20 1147 06/07/20 1540   BP:  124/79   Pulse:  78   Resp:  18   Temp: 98.6  F (37  C)    TempSrc: Oral    SpO2:  96%       Cardiac Rhythm:     Was the PSS-3 completed:   Yes  What interventions are required if any?               Family Comments: NA  OBS brochure/video discussed/provided to patient/family: No              Name of person given brochure if not patient: NA              Relationship to patient: NA    For the majority of the shift this patient's behavior was Green.   Behavioral interventions performed were NA.    ED NURSE PHONE NUMBER: [phone]

## 2020-06-07 NOTE — PROGRESS NOTES
RECEIVING UNIT ED HANDOFF REVIEW    ED Nurse Handoff Report was reviewed by: Sheeba Justin RN on June 7, 2020 at 4:39 PM

## 2020-06-07 NOTE — ED NOTES
Bed: ED27  Expected date:   Expected time:   Means of arrival:   Comments:  E2  75 M blood in urine/no covid  1103

## 2020-06-07 NOTE — ED NOTES
Patient was still wearing wrist band from previous visit. Wrist band was worn and appeared old. Wrist band cut off patient.

## 2020-06-08 LAB
ANION GAP SERPL CALCULATED.3IONS-SCNC: 5 MMOL/L (ref 3–14)
BUN SERPL-MCNC: 18 MG/DL (ref 7–30)
CALCIUM SERPL-MCNC: 8.3 MG/DL (ref 8.5–10.1)
CHLORIDE SERPL-SCNC: 110 MMOL/L (ref 94–109)
CO2 SERPL-SCNC: 25 MMOL/L (ref 20–32)
CREAT SERPL-MCNC: 0.73 MG/DL (ref 0.66–1.25)
ERYTHROCYTE [DISTWIDTH] IN BLOOD BY AUTOMATED COUNT: 15.1 % (ref 10–15)
GFR SERPL CREATININE-BSD FRML MDRD: >90 ML/MIN/{1.73_M2}
GLUCOSE SERPL-MCNC: 84 MG/DL (ref 70–99)
HCT VFR BLD AUTO: 34.4 % (ref 40–53)
HGB BLD-MCNC: 11.2 G/DL (ref 13.3–17.7)
MCH RBC QN AUTO: 32.3 PG (ref 26.5–33)
MCHC RBC AUTO-ENTMCNC: 32.6 G/DL (ref 31.5–36.5)
MCV RBC AUTO: 99 FL (ref 78–100)
PLATELET # BLD AUTO: 112 10E9/L (ref 150–450)
POTASSIUM SERPL-SCNC: 3.8 MMOL/L (ref 3.4–5.3)
RBC # BLD AUTO: 3.47 10E12/L (ref 4.4–5.9)
SARS-COV-2 PCR COMMENT: NORMAL
SARS-COV-2 RNA SPEC QL NAA+PROBE: NEGATIVE
SARS-COV-2 RNA SPEC QL NAA+PROBE: NORMAL
SODIUM SERPL-SCNC: 140 MMOL/L (ref 133–144)
SPECIMEN SOURCE: NORMAL
SPECIMEN SOURCE: NORMAL
WBC # BLD AUTO: 8.6 10E9/L (ref 4–11)

## 2020-06-08 PROCEDURE — 80048 BASIC METABOLIC PNL TOTAL CA: CPT | Performed by: INTERNAL MEDICINE

## 2020-06-08 PROCEDURE — 36415 COLL VENOUS BLD VENIPUNCTURE: CPT | Performed by: INTERNAL MEDICINE

## 2020-06-08 PROCEDURE — 99221 1ST HOSP IP/OBS SF/LOW 40: CPT | Performed by: UROLOGY

## 2020-06-08 PROCEDURE — 85027 COMPLETE CBC AUTOMATED: CPT | Performed by: INTERNAL MEDICINE

## 2020-06-08 PROCEDURE — 12000000 ZZH R&B MED SURG/OB

## 2020-06-08 PROCEDURE — 25000132 ZZH RX MED GY IP 250 OP 250 PS 637: Performed by: INTERNAL MEDICINE

## 2020-06-08 PROCEDURE — 25000125 ZZHC RX 250: Performed by: INTERNAL MEDICINE

## 2020-06-08 PROCEDURE — 25800025 ZZH RX 258: Performed by: INTERNAL MEDICINE

## 2020-06-08 PROCEDURE — 99232 SBSQ HOSP IP/OBS MODERATE 35: CPT | Performed by: INTERNAL MEDICINE

## 2020-06-08 RX ORDER — GINSENG 100 MG
CAPSULE ORAL
Status: DISCONTINUED | OUTPATIENT
Start: 2020-06-08 | End: 2020-06-10 | Stop reason: HOSPADM

## 2020-06-08 RX ADMIN — SODIUM CHLORIDE: 900 IRRIGANT IRRIGATION at 06:15

## 2020-06-08 RX ADMIN — POLYETHYLENE GLYCOL 3350 17 G: 17 POWDER, FOR SOLUTION ORAL at 08:56

## 2020-06-08 RX ADMIN — VALPROIC ACID 1000 MG: 250 CAPSULE, LIQUID FILLED ORAL at 22:05

## 2020-06-08 RX ADMIN — GLYCOPYRROLATE 1 MG: 1 TABLET ORAL at 22:05

## 2020-06-08 RX ADMIN — SIMVASTATIN 10 MG: 10 TABLET, FILM COATED ORAL at 22:05

## 2020-06-08 RX ADMIN — VALPROIC ACID 500 MG: 250 CAPSULE, LIQUID FILLED ORAL at 08:56

## 2020-06-08 RX ADMIN — FINASTERIDE 5 MG: 5 TABLET, FILM COATED ORAL at 08:56

## 2020-06-08 RX ADMIN — BACITRACIN: 500 OINTMENT TOPICAL at 22:10

## 2020-06-08 RX ADMIN — SENNOSIDES AND DOCUSATE SODIUM 1 TABLET: 8.6; 5 TABLET ORAL at 08:56

## 2020-06-08 RX ADMIN — PERAMPANEL 4 MG: 4 TABLET ORAL at 22:05

## 2020-06-08 RX ADMIN — PERAMPANEL 4 MG: 4 TABLET ORAL at 00:36

## 2020-06-08 RX ADMIN — ZONISAMIDE 300 MG: 100 CAPSULE ORAL at 08:55

## 2020-06-08 RX ADMIN — GLYCOPYRROLATE 1 MG: 1 TABLET ORAL at 08:56

## 2020-06-08 RX ADMIN — GLYCOPYRROLATE 1 MG: 1 TABLET ORAL at 17:20

## 2020-06-08 ASSESSMENT — ACTIVITIES OF DAILY LIVING (ADL)
ADLS_ACUITY_SCORE: 35
ADLS_ACUITY_SCORE: 39
ADLS_ACUITY_SCORE: 35
ADLS_ACUITY_SCORE: 35

## 2020-06-08 NOTE — PROGRESS NOTES
[facility]    Hospitalist Progress Note    Assessment & Plan   Josiah Toney is a 75 year old male who was admitted on 6/7/2020.  Patient has extensive urologic history which include BPH with urinary retention status post suprapubic catheter placement since 2017, he is also on oral anticoagulation with Eliquis for DVT presented to the hospital with a hematuria while nursing staff at long-term care facility because exchanging his suprapubic catheter.    Hematuria secondary to trauma from suprapubic Shearer catheter exchange while patient on Eliquis.  ---Currently hemoglobin is stable, he has mild hematuria present, seen by urology team, they are recommending to wean off CBI and try to do hand irrigation, if he can wean off the CBI plan is to restart Eliquis in the morning.  For now hold off on Eliquis.  --- Patient has a suprapubic tube and a urethral Shearer  --- CT of the abdomen and pelvis indicates a moderate amount of blood in the bladder.  Chronic anemia;  Monitor hemoglobin closely with daily checks in the setting of hematuria  ---PTA hemoglobin was 12.4, appears to be his baseline range, it dropped to 11.2 today, monitor closely.     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  --  -Continue PTA meds once reviewed by pharmacy     History of DVT:  --Continue PTA Eliquis 2.5 mg BID and monitor platelet count closely.     Dysphagia:  Speech articulation D/O:  Last admission Speech recommended NPO with feeding tube.  After discussion with patient's brother (Jeff) it was indicated that they would want to take the risk of aspiration and allow patient to eat without restrictions. Chest Xray negative.   --Appreciate speech eval and recommendation for dysphagia diet with thickened  liquids. Family is aware and wants mechanical soft diet with regular liquids.  --Will respect wishes of family and continue mechanical soft diet with regular liquids despite risk of aspiration.     BPH with history of urinary retention s/p cystolitholapaxy of bladder and suprapubic cystotomy with tube placement (1/2017):  Follows with Urology at [facility].    --Continue PTA Proscar 5 mg daily.       Mixed hyperlipidemia:  --Continue PTA simvastatin 10 mg at bedtime.       Chronic sialadenitis:  --Continue PTA Robinul 1 mg TID.      DVT Prophylaxis: SCDs as the patient has hematuria  Code Status: Full Code     Disposition: Expected discharge in 1 to 2 days depending on her hemoglobin and extremity rehab.  He would go back to the long-term care facility    Imani Rahman MD  Text Page   (7am to 6pm)    Interval History   Patient is resting comfortably, he appears to be weak, and does not communicate well, denies any new complaints    -Data reviewed today: I reviewed all new labs and imaging results over the last 24 hours.  Physical Exam   Temp: 100  F (37.8  C) Temp src: Oral BP: 101/44 Pulse: 89   Resp: 17 SpO2: 96 % O2 Device: None (Room air)    There were no vitals filed for this visit.  Vital Signs with Ranges  Temp:  [97.2  F (36.2  C)-100  F (37.8  C)] 100  F (37.8  C)  Pulse:  [65-89] 89  Resp:  [16-18] 17  BP: ()/(44-79) 101/44  SpO2:  [90 %-98 %] 96 %  I/O last 3 completed shifts:  In: -   Out: -[id number]     Constitutional: Awake, alert, cooperative, appears to be oriented to self  Respiratory: Clear to auscultation bilaterally, no crackles or wheezing  Cardiovascular: Regular rate and rhythm, normal S1 and S2, and no murmur noted  GI: Normal bowel sounds, soft, non-distended, non-tender, suprapubic catheter noted, he also has a urethral Shearer.  Skin/Integumen: No rashes, no cyanosis, no edema  Neuro : Bilateral lower extremity weakness noted, he is oriented to self  Medications     sodium chloride 0.9% (bag)  100 mL/hr at 06/08/20 0615       finasteride  5 mg Oral Daily     glycopyrrolate  1 mg Oral TID     perampanel  4 mg Oral At Bedtime     polyethylene glycol  17 g Oral QAM     senna-docusate  1 tablet Oral BID     simvastatin  10 mg Oral At Bedtime     sodium chloride (PF)  3 mL Intracatheter Q8H     valproic acid  1,000 mg Oral At Bedtime     valproic acid  500 mg Oral QAM     zonisamide  300 mg Oral QAM       Data   Recent Labs   Lab 06/08/20  0709 06/07/20  1136   WBC 8.6 13.8*   HGB 11.2* 12.4*   MCV 99 99   * 152    141   POTASSIUM 3.8 3.9   CHLORIDE 110* 111*   CO2 25 26   BUN 18 21   CR 0.73 0.86   ANIONGAP 5 4   BREANN 8.3* 8.6   GLC 84 104*     Recent Labs   Lab 06/08/20  0709 06/07/20  1136   GLC 84 104*       Imaging:   Recent Results (from the past 24 hour(s))   CT Abdomen Pelvis w Contrast    Narrative    CT ABDOMEN AND PELVIS WITH CONTRAST 6/7/2020 12:33 PM     HISTORY: Suprapubic catheter exchange yesterday, now abdominal pain  with significant hematuria.    COMPARISON: January 15, 2020    TECHNIQUE: Volumetric helical acquisition of CT images from the lung  bases through the symphysis pubis after the administration of 81 mL  Isovue-370 intravenous contrast. Radiation dose for this scan was  reduced using automated exposure control, adjustment of the mA and/or  kV according to patient size, or iterative reconstruction technique.    FINDINGS: Hyperdense layering material in the bladder compatible with  blood in the bladder. No definite active extravasation.  Low-attenuation subcentimeter liver lesion(s) compatible with benign  cysts or other benign lesions. No specific evaluation or follow-up is  recommended in a low risk patient. Adrenal glands, kidneys, spleen,  and pancreas demonstrate no worrisome focal lesion. There are no  dilated loops of small intestine or large bowel to suggest ileus or  obstruction. Small amount of free fluid. No free air. Normal caliber  aorta. Mild atelectasis  and/or fibrosis at the lung bases. Survey of  the visualized bony structures demonstrates no destructive bony  lesions.      Impression    IMPRESSION: Moderate amount of hemorrhage within the bladder. No  active extravasation.     BARRETT HERNANDEZ MD

## 2020-06-08 NOTE — PROGRESS NOTES
Writer called [facility] phone#[phone] per [facility] patient has an 18 day bed hold for LTC.  Baseline patient is a total care, he does have a wheelchair that he moves around in, feeds him self with set up, A+O to self soft voice.    Writer gave [facility] brief update on discharge plan and will coordinate transportation back to [facility] when medically stable.

## 2020-06-08 NOTE — CONSULTS
Urology Consult History and Physical    Name: Josiah Toney    MRN: [id number]   YOB: 1945       We were asked to see Josiah Toney at the request of Dr. Chan for evaluation and treatment of gross hematuria .          Chief Complaint:   Gross hematuria     Unable to obtain a history from the patient due to chronic memory and cognitive impairment - obtained from the medical record          History of Present Illness:   Josiah Toney is a 75 year old male who is being seen for evaluation of gross hematuria following supra-pubic tube catheter change at his long term care facility. He has history of CP, CVA with left sided hemiparesis, [place] myoclonic epilepsy, history of DVT on eliquis, BPH with h/o urinary retention and cystolithalopaxy s/p supra-pubic tube since 01/2017 and following with [facility]. He presented with new onset of gross hematuria following routine supra-pubic tube change at his long term care facility. CT in the ER demonstrates proper positioning of the supra-pubic tube catheter which is a 3-way and is on CBI. Urethral catheter placed overnight due to leakage of urine overnight.     Doing well this AM with no complaints. Urine clear on slow CBI this AM.            Past Medical History:     Past Medical History:   Diagnosis Date     [place] myoclonus epilepsy (H)      Cerebral palsy (H)      Depression      DVT (deep venous thrombosis) (H)      Dyslipidemia      Hypertension      Nephrolithiasis      PFO (patent foramen ovale)      Unspecified cerebral artery occlusion with cerebral infarction 10/10     Urinary retention             Past Surgical History:     Past Surgical History:   Procedure Laterality Date     ------------OTHER-------------      Cystolithopaxy and suprapubic cystotomy with catheter placement     LITHOTRIPSY  2001            Social History:     Social History     Tobacco Use     Smoking status: Never Smoker     Smokeless tobacco: Never Used   Substance  Use Topics     Alcohol use: Not Currently            Family History:   No family history on file.           Allergies:   No Known Allergies         Medications:     Current Facility-Administered Medications   Medication     acetaminophen (TYLENOL) Suppository 650 mg     acetaminophen (TYLENOL) tablet 650 mg     bacitracin ointment     bisacodyl (DULCOLAX) Suppository 10 mg     calcium carbonate (TUMS) chewable tablet 500 mg     finasteride (PROSCAR) tablet 5 mg     glycopyrrolate (ROBINUL) tablet 1 mg     HYDROmorphone (PF) (DILAUDID) injection 0.2 mg     lidocaine (LMX4) cream     lidocaine 1 % 0.1-1 mL     melatonin tablet 1 mg     naloxone (NARCAN) injection 0.1-0.4 mg     ondansetron (ZOFRAN-ODT) ODT tab 4 mg    Or     ondansetron (ZOFRAN) injection 4 mg     perampanel (FYCOMPA) tablet 4 mg     polyethylene glycol (MIRALAX) Packet 17 g     polyethylene glycol (MIRALAX) Packet 17 g     prochlorperazine (COMPAZINE) injection 5 mg    Or     prochlorperazine (COMPAZINE) tablet 5 mg    Or     prochlorperazine (COMPAZINE) Suppository 12.5 mg     senna-docusate (SENOKOT-S/PERICOLACE) 8.6-50 MG per tablet 1 tablet     simvastatin (ZOCOR) tablet 10 mg     sodium chloride (PF) 0.9% PF flush 3 mL     sodium chloride (PF) 0.9% PF flush 3 mL     sodium chloride 0.9% (bag) irrigation     valproic acid (DEPAKENE) capsule 1,000 mg     valproic acid (DEPAKENE) capsule 500 mg     zonisamide (ZONEGRAN) capsule 300 mg             Review of Systems:   Unable to complete given cognitive impairment          Physical Exam:     Patient Vitals for the past 24 hrs:   BP Temp Temp src Pulse Resp SpO2   06/08/20 1556 93/45 -- -- -- -- --   06/08/20 1500 (!) 80/48 97.7  F (36.5  C) Oral 67 18 96 %   06/08/20 1108 -- 98.4  F (36.9  C) Oral -- -- --   06/08/20 0751 101/44 100  F (37.8  C) Oral 89 -- 96 %   06/08/20 0500 96/72 -- -- -- -- --   06/08/20 0148 -- 97.2  F (36.2  C) [facility] 65 17 90 %   06/07/20 2149 116/46 99.1  F (37.3  C) Oral  75 16 98 %     General: age-appropriate appearing male, h/o CVA  HEENT: Head AT/NC, EOMI, CN Grossly intact  Lungs: no respiratory distress, or pursed lip breathing  Heart: No obvious jugular venous distension present  Back: no bony midline tenderness, no CVAT bilaterally.  Abdomen: soft, non-distended, non-tender. No organomegaly  : supra-pubic tube in place on slow drip CBI, urethral catheter draining light smith  Lymph: no palpable inguinal lymphadenopathy.  LE: no edema. pneumoboots in place.  Musculoskeltal: extremities normal, no peripheral edema  Skin: no suspicious lesions or rashes  Neuro: Alert, speech slow          Data:   All laboratory data reviewed:    Recent Labs   Lab 06/08/20  0709 06/07/20  1136   WBC 8.6 13.8*   HGB 11.2* 12.4*   * 152     Recent Labs   Lab 06/08/20  0709 06/07/20  1136    141   POTASSIUM 3.8 3.9   CHLORIDE 110* 111*   CO2 25 26   BUN 18 21   CR 0.73 0.86   GLC 84 104*   BREANN 8.3* 8.6     No lab results found in last 7 days.    Invalid input(s): URINEBLOOD    IMAGING:  All pertinent imaging reviewed:    All imaging studies reviewed by me.  I personally reviewed these imaging films.  A formal report from radiology will follow.    CT ABD/PEL:  FINDINGS: Hyperdense layering material in the bladder compatible with  blood in the bladder. No definite active extravasation.  Low-attenuation subcentimeter liver lesion(s) compatible with benign  cysts or other benign lesions. No specific evaluation or follow-up is  recommended in a low risk patient. Adrenal glands, kidneys, spleen,  and pancreas demonstrate no worrisome focal lesion. There are no  dilated loops of small intestine or large bowel to suggest ileus or  obstruction. Small amount of free fluid. No free air. Normal caliber  aorta. Mild atelectasis and/or fibrosis at the lung bases. Survey of  the visualized bony structures demonstrates no destructive bony  lesions.                                                                       IMPRESSION: Moderate amount of hemorrhage within the bladder. No  active extravasation.          Impression and Plan:   Impression:   Josiah Toney is a 75 year old male who is being seen for evaluation of gross hematuria following supra-pubic tube catheter change at his long term care facility. He has history of CP, CVA with left sided hemiparesis, [place] myoclonic epilepsy, history of DVT on eliquis, BPH with h/o urinary retention and cystolithalopaxy s/p supra-pubic tube since 01/2017 and following with [facility]. He presented with new onset of gross hematuria following routine supra-pubic tube change at his long term care facility. CT in the ER demonstrates proper positioning of the supra-pubic tube catheter which is a 3-way and is on CBI. Urethral catheter placed overnight due to leakage of urine overnight.      Plan:   Gross hematuria   - wean CBI as colin  - if able to hold CBI today, then may restart Eliquis tomorrow  - hand irrigate through the urethral catheter as needed  - If he remains clear off CBI, would plan for urethral catheter removal prior to discharge  - He may follow up with his regular Urologist at [facility] following discharge      Keenan Carson MD   Urology  [facility]  Clinic Phone [phone]

## 2020-06-08 NOTE — PLAN OF CARE
Arrived to floor from ED at 1745.   A&Ox3; forgetful. Disoriented to time. VSS. Denies pain. T&R q2h; up with lift. Blanchable redness to coccyx; mepilex placed. L medial ankle wound covered with mepilex; small amount of serosanguinous drainage. Suprapubic cath in place; leaking. CBI moderate rate; bright red output. Purewick in place with bright red output. Plan for urology consult tomorrow.

## 2020-06-08 NOTE — PROGRESS NOTES
A&O x3. VSS. Up with a lift. Turn and reposition q2h. Mechanical soft diet with thin liquids. Patient had episode where he appeared unresponsive, writer sternal rubbed patient, after several seconds patient opened eyes - vitals were stable and patient stated he felt okay. Suprapubic catheter with CBI running at slow rate, villalta placed into urethra to drain CBI, fluid light pink tinged, small clots. Blanchable redness to coccyx, covered with mepliex. L ankle wound mepliex C/D/I. PIV SL. Slept between cares.

## 2020-06-09 LAB
BASOPHILS # BLD AUTO: 0 10E9/L (ref 0–0.2)
BASOPHILS NFR BLD AUTO: 0.1 %
DIFFERENTIAL METHOD BLD: ABNORMAL
EOSINOPHIL # BLD AUTO: 0.1 10E9/L (ref 0–0.7)
EOSINOPHIL NFR BLD AUTO: 0.6 %
ERYTHROCYTE [DISTWIDTH] IN BLOOD BY AUTOMATED COUNT: 14.9 % (ref 10–15)
HCT VFR BLD AUTO: 31.1 % (ref 40–53)
HGB BLD-MCNC: 10.1 G/DL (ref 13.3–17.7)
IMM GRANULOCYTES # BLD: 0 10E9/L (ref 0–0.4)
IMM GRANULOCYTES NFR BLD: 0.2 %
LYMPHOCYTES # BLD AUTO: 1.2 10E9/L (ref 0.8–5.3)
LYMPHOCYTES NFR BLD AUTO: 14.4 %
MCH RBC QN AUTO: 32.1 PG (ref 26.5–33)
MCHC RBC AUTO-ENTMCNC: 32.5 G/DL (ref 31.5–36.5)
MCV RBC AUTO: 99 FL (ref 78–100)
MONOCYTES # BLD AUTO: 1.1 10E9/L (ref 0–1.3)
MONOCYTES NFR BLD AUTO: 13.6 %
NEUTROPHILS # BLD AUTO: 5.8 10E9/L (ref 1.6–8.3)
NEUTROPHILS NFR BLD AUTO: 71.1 %
NRBC # BLD AUTO: 0 10*3/UL
NRBC BLD AUTO-RTO: 0 /100
PLATELET # BLD AUTO: 106 10E9/L (ref 150–450)
RBC # BLD AUTO: 3.15 10E12/L (ref 4.4–5.9)
WBC # BLD AUTO: 8.2 10E9/L (ref 4–11)

## 2020-06-09 PROCEDURE — 25000132 ZZH RX MED GY IP 250 OP 250 PS 637: Performed by: INTERNAL MEDICINE

## 2020-06-09 PROCEDURE — 12000000 ZZH R&B MED SURG/OB

## 2020-06-09 PROCEDURE — 85025 COMPLETE CBC W/AUTO DIFF WBC: CPT | Performed by: INTERNAL MEDICINE

## 2020-06-09 PROCEDURE — 99232 SBSQ HOSP IP/OBS MODERATE 35: CPT | Performed by: INTERNAL MEDICINE

## 2020-06-09 PROCEDURE — 99232 SBSQ HOSP IP/OBS MODERATE 35: CPT | Performed by: UROLOGY

## 2020-06-09 PROCEDURE — 36415 COLL VENOUS BLD VENIPUNCTURE: CPT | Performed by: INTERNAL MEDICINE

## 2020-06-09 RX ORDER — CARBOXYMETHYLCELLULOSE SODIUM 5 MG/ML
2 SOLUTION/ DROPS OPHTHALMIC
Status: DISCONTINUED | OUTPATIENT
Start: 2020-06-09 | End: 2020-06-10 | Stop reason: HOSPADM

## 2020-06-09 RX ADMIN — GLYCOPYRROLATE 1 MG: 1 TABLET ORAL at 09:14

## 2020-06-09 RX ADMIN — FINASTERIDE 5 MG: 5 TABLET, FILM COATED ORAL at 09:14

## 2020-06-09 RX ADMIN — APIXABAN 2.5 MG: 2.5 TABLET, FILM COATED ORAL at 20:55

## 2020-06-09 RX ADMIN — SENNOSIDES AND DOCUSATE SODIUM 1 TABLET: 8.6; 5 TABLET ORAL at 20:54

## 2020-06-09 RX ADMIN — GLYCOPYRROLATE 1 MG: 1 TABLET ORAL at 21:03

## 2020-06-09 RX ADMIN — GLYCOPYRROLATE 1 MG: 1 TABLET ORAL at 16:54

## 2020-06-09 RX ADMIN — ACETAMINOPHEN 650 MG: 325 TABLET, FILM COATED ORAL at 22:31

## 2020-06-09 RX ADMIN — ACETAMINOPHEN 650 MG: 325 TABLET, FILM COATED ORAL at 16:54

## 2020-06-09 RX ADMIN — POLYETHYLENE GLYCOL 3350 17 G: 17 POWDER, FOR SOLUTION ORAL at 09:14

## 2020-06-09 RX ADMIN — SIMVASTATIN 10 MG: 10 TABLET, FILM COATED ORAL at 21:03

## 2020-06-09 RX ADMIN — VALPROIC ACID 1000 MG: 250 CAPSULE, LIQUID FILLED ORAL at 21:03

## 2020-06-09 RX ADMIN — ZONISAMIDE 300 MG: 100 CAPSULE ORAL at 09:14

## 2020-06-09 RX ADMIN — PERAMPANEL 4 MG: 4 TABLET ORAL at 21:03

## 2020-06-09 RX ADMIN — VALPROIC ACID 500 MG: 250 CAPSULE, LIQUID FILLED ORAL at 09:14

## 2020-06-09 RX ADMIN — SENNOSIDES AND DOCUSATE SODIUM 1 TABLET: 8.6; 5 TABLET ORAL at 09:14

## 2020-06-09 RX ADMIN — APIXABAN 2.5 MG: 2.5 TABLET, FILM COATED ORAL at 10:10

## 2020-06-09 ASSESSMENT — ACTIVITIES OF DAILY LIVING (ADL)
ADLS_ACUITY_SCORE: 35
ADLS_ACUITY_SCORE: 37
ADLS_ACUITY_SCORE: 35

## 2020-06-09 NOTE — PLAN OF CARE
[id number]: Pt is A&O2- slow to respond, baseline. VSS. C/o of some discomfort at tip of penis, bacitracin applied. SP cath patent w/ yellow OP. CBI clamped. Shearer draining w/ yellow OP. Mech soft diet. Meds in applesauce. Mepliex to coccyx. Up with lift. Repos q2hrs. Plans pending.

## 2020-06-09 NOTE — PROGRESS NOTES
/50 (BP Location: Left arm)   Pulse 70   Temp 99.5  F (37.5  C) (Oral)   Resp 18   SpO2 96%     A great pleasure to see this very pleasant 75-year-old gentleman in follow-up consultation today.  As we recall he had developed gross hematuria following suprapubic catheter change at his group home.  He has a history of cerebral palsy with a history also a cerebrovascular accident with left-sided hemiparesis.  There is also history of epilepsy in addition there is a history of thromboembolic disease and is on Eliquis.  In the past he is also had problems with BPH and urinary retention and cystolitholapaxy.  At this time he has a three-way catheter in the suprapubic site and there is a urethral catheter in the bladder.  The urine now is completely clear.  His general condition is otherwise stable.   Past Medical History:   Diagnosis Date     [place] myoclonus epilepsy (H)      Cerebral palsy (H)      Depression      DVT (deep venous thrombosis) (H)      Dyslipidemia      Hypertension      Nephrolithiasis      PFO (patent foramen ovale)      Unspecified cerebral artery occlusion with cerebral infarction 10/10     Urinary retention      Past Surgical History:   Procedure Laterality Date     ------------OTHER-------------      Cystolithopaxy and suprapubic cystotomy with catheter placement     LITHOTRIPSY  2001       Current Facility-Administered Medications:      acetaminophen (TYLENOL) Suppository 650 mg, 650 mg, Rectal, Q4H PRN, Marva Chan MD     acetaminophen (TYLENOL) tablet 650 mg, 650 mg, Oral, Q4H PRN, Marva Chan MD     bacitracin ointment, , Topical, Q1H PRN, Imani Rahman MD     bisacodyl (DULCOLAX) Suppository 10 mg, 10 mg, Rectal, Daily PRN, Marva Chan MD     calcium carbonate (TUMS) chewable tablet 500 mg, 500 mg, Oral, BID PRN, Marva Chan MD     carboxymethylcellulose PF (REFRESH PLUS) 0.5 % ophthalmic solution 2 drop, 2 drop, Ophthalmic, Q1H PRN, Imani Rahman MD     finasteride  (PROSCAR) tablet 5 mg, 5 mg, Oral, Daily, Marva Chan MD, 5 mg at 06/08/20 0856     glycopyrrolate (ROBINUL) tablet 1 mg, 1 mg, Oral, TID, Marva Chan MD, 1 mg at 06/08/20 2205     HYDROmorphone (PF) (DILAUDID) injection 0.2 mg, 0.2 mg, Intravenous, Q2H PRN, Marav Chan MD     lidocaine (LMX4) cream, , Topical, Q1H PRN, Marva Chan MD     lidocaine 1 % 0.1-1 mL, 0.1-1 mL, Other, Q1H PRN, Marva Chan MD     melatonin tablet 1 mg, 1 mg, Oral, At Bedtime PRN, Marva Chan MD     naloxone (NARCAN) injection 0.1-0.4 mg, 0.1-0.4 mg, Intravenous, Q2 Min PRN, Marva Chan MD     ondansetron (ZOFRAN-ODT) ODT tab 4 mg, 4 mg, Oral, Q6H PRN **OR** ondansetron (ZOFRAN) injection 4 mg, 4 mg, Intravenous, Q6H PRN, Marva Chan MD     perampanel (FYCOMPA) tablet 4 mg, 4 mg, Oral, At Bedtime, Marva Chan MD, 4 mg at 06/08/20 2205     polyethylene glycol (MIRALAX) Packet 17 g, 17 g, Oral, Daily PRN, Marva Chan MD, 17 g at 06/07/20 1903     polyethylene glycol (MIRALAX) Packet 17 g, 17 g, Oral, QAM, Marva Chan MD, 17 g at 06/08/20 0856     prochlorperazine (COMPAZINE) injection 5 mg, 5 mg, Intravenous, Q6H PRN **OR** prochlorperazine (COMPAZINE) tablet 5 mg, 5 mg, Oral, Q6H PRN **OR** prochlorperazine (COMPAZINE) Suppository 12.5 mg, 12.5 mg, Rectal, Q12H PRN, Marva Chan MD     senna-docusate (SENOKOT-S/PERICOLACE) 8.6-50 MG per tablet 1 tablet, 1 tablet, Oral, BID, Marva Chan MD, 1 tablet at 06/08/20 0856     simvastatin (ZOCOR) tablet 10 mg, 10 mg, Oral, At Bedtime, Marva Chan MD, 10 mg at 06/08/20 2205     sodium chloride (PF) 0.9% PF flush 3 mL, 3 mL, Intracatheter, q1 min prn, Marva Chan MD     sodium chloride (PF) 0.9% PF flush 3 mL, 3 mL, Intracatheter, Q8H, Marva Chan MD, 3 mL at 06/09/20 0652     sodium chloride 0.9% (bag) irrigation, , Irrigation, Continuous, Marva Chan MD, Last Rate: 100 mL/hr at 06/08/20 0615     valproic acid (DEPAKENE) capsule 1,000 mg, 1,000 mg,  Oral, At Bedtime, Marva Chan MD, 1,000 mg at 06/08/20 2205     valproic acid (DEPAKENE) capsule 500 mg, 500 mg, Oral, Rocio WALDEN Sujatha, MD, 500 mg at 06/08/20 0856     zonisamide (ZONEGRAN) capsule 300 mg, 300 mg, Oral, Rocio WALDEN Sujatha, MD, 300 mg at 06/08/20 0855     Examination.    Appears a pleasant young man who is not in distress, unable to articulate clearly.  Mental status appears to be unchanged  Respiratory system.  No evidence of respiratory distress.  Cardiovascular no obvious jugular venous distention no significant pitting peripheral edema.  Abdomen.  Suprapubic site is satisfactory no leakage around catheter at this time.  Urinary system.  Suprapubic tube satisfactory.  Only catheter draining clear urine.  Extremities.  No other significant features.  Neurologic system patient is awake and alert difficulty with speech noted consistent with cerebral palsy.    Labs.  Results for HEATHER HAQ (MRN [id number]) as of 6/9/2020 08:03   Ref. Range 6/9/2020 08:07   WBC Latest Ref Range: 4.0 - 11.0 10e9/L 8.2   Hemoglobin Latest Ref Range: 13.3 - 17.7 g/dL 10.1 (L)   Hematocrit Latest Ref Range: 40.0 - 53.0 % 31.1 (L)   Platelet Count Latest Ref Range: 150 - 450 10e9/L 106 (L)   RBC Count Latest Ref Range: 4.4 - 5.9 10e12/L 3.15 (L)   MCV Latest Ref Range: 78 - 100 fl 99   MCH Latest Ref Range: 26.5 - 33.0 pg 32.1   MCHC Latest Ref Range: 31.5 - 36.5 g/dL 32.5   RDW Latest Ref Range: 10.0 - 15.0 % 14.9   Diff Method Unknown Automated Method   % Neutrophils Latest Units: % 71.1   % Lymphocytes Latest Units: % 14.4   % Monocytes Latest Units: % 13.6   % Eosinophils Latest Units: % 0.6   % Basophils Latest Units: % 0.1   % Immature Granulocytes Latest Units: % 0.2   Nucleated RBCs Latest Ref Range: 0 /100 0   Absolute Neutrophil Latest Ref Range: 1.6 - 8.3 10e9/L 5.8   Absolute Lymphocytes Latest Ref Range: 0.8 - 5.3 10e9/L 1.2   Absolute Monocytes Latest Ref Range: 0.0 - 1.3 10e9/L 1.1   Absolute  Eosinophils Latest Ref Range: 0.0 - 0.7 10e9/L 0.1   Absolute Basophils Latest Ref Range: 0.0 - 0.2 10e9/L 0.0   Abs Immature Granulocytes Latest Ref Range: 0 - 0.4 10e9/L 0.0   Absolute Nucleated RBC Unknown 0.0     Impression.  Situation improved.  We will remove the urethral catheter today.  The suprapubic tube clearly will stay in place.  At some point we will need to change this tube for a two-way tube of similar caliber before he returns to his group home.  We will follow this closely I would anticipate he may stay until tomorrow.    Careful review of records or labs and all normal hormone studies discussion with professional staff

## 2020-06-09 NOTE — PLAN OF CARE
A&Ox2-3, slow to respond. VSS on RA. C/o pain @ sp cath site, relief w/ice & rest. T/R every 2 hours. Mepilex to coccyx & L ankle, CDI. Urethral villalta removed per urology. SP cath patent, clear yellow UOP. Mechanical soft diet, good appetite. PIV SL. Eliquis restarted today, monitor for signs of bleeding. Plan to return to LTC at discharge.

## 2020-06-09 NOTE — PROGRESS NOTES
[facility]    Hospitalist Progress Note    Assessment & Plan   Josiah Toney is a 75 year old male who was admitted on 6/7/2020.  Patient has extensive urologic history which include BPH with urinary retention status post suprapubic catheter placement since 2017, he is also on oral anticoagulation with Eliquis for DVT presented to the hospital with a hematuria while nursing staff at long-term care facility because exchanging his suprapubic catheter.    Hematuria secondary to trauma from suprapubic Shearer catheter exchange while patient on Eliquis.  ---Currently hemoglobin is stable, he has mild hematuria present, seen by urology team, they are recommending to wean off CBI and try to do hand irrigation, if he can wean off the CBI plan is to restart Eliquis in the morning.    --- We were able to wean off the CBI, currently he has a suprapubic catheter, his urethral catheter was removed, plan to restart Eliquis today and monitor for any ongoing bleeding, will evaluate his hemoglobin in a.m., if it stays stable and no active bleeding possibly can discharge tomorrow.  --- CT of the abdomen and pelvis on admission indicated moderate amount of blood in the bladder.  Chronic anemia;  Monitor hemoglobin closely with daily checks in the setting of hematuria  ---PTA hemoglobin was 12.4, his hemoglobin has dropped to 10.1, since there is no active bleeding we will repeat it again in a.m.  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  --  -Continue PTA meds once reviewed by pharmacy     History of DVT:  --Continue PTA Eliquis 2.5 mg BID and monitor platelet count closely.     Dysphagia:  Speech articulation D/O:  Last admission Speech recommended NPO with feeding tube.  After discussion with  patient's brother (Jeff) it was indicated that they would want to take the risk of aspiration and allow patient to eat without restrictions. Chest Xray negative.   --Appreciate speech eval and recommendation for dysphagia diet with thickened liquids. Family is aware and wants mechanical soft diet with regular liquids.  --Will respect wishes of family and continue mechanical soft diet with regular liquids despite risk of aspiration.     BPH with history of urinary retention s/p cystolitholapaxy of bladder and suprapubic cystotomy with tube placement (1/2017):  Follows with Urology at [facility].    --Continue PTA Proscar 5 mg daily.       Mixed hyperlipidemia:  --Continue PTA simvastatin 10 mg at bedtime.       Chronic sialadenitis:  --Continue PTA Robinul 1 mg TID.      DVT Prophylaxis: SCDs as the patient has hematuria  Code Status: Full Code     Disposition: Expected discharge possibly 6/10 to long-term care facility    Imani Rahman MD  Text Page   (7am to 6pm)    Interval History   Patient is mostly nonverbal, he denies any new concerns, he appears to be comfortable, urethral catheter was removed and currently suprapubic catheter drainage appears to be clear.    -Data reviewed today: I reviewed all new labs and imaging results over the last 24 hours.  Physical Exam   Temp: 97.1  F (36.2  C) Temp src: Oral BP: 91/47 Pulse: 66   Resp: 18 SpO2: 95 % O2 Device: None (Room air)    There were no vitals filed for this visit.  Vital Signs with Ranges  Temp:  [97.1  F (36.2  C)-99.5  F (37.5  C)] 97.1  F (36.2  C)  Pulse:  [66-70] 66  Resp:  [18] 18  BP: ()/(45-50) 91/47  SpO2:  [95 %-96 %] 95 %  I/O last 3 completed shifts:  In: 240 [P.O.:240]  Out: 2925 [Urine:2925]    Constitutional: Awake, alert, cooperative, appears to be oriented to self  Respiratory: Clear to auscultation bilaterally, no crackles or wheezing  Cardiovascular: Regular rate and rhythm, normal S1 and S2, and no murmur noted  GI: Normal bowel  sounds, soft, non-distended, non-tender, suprapubic catheter noted, he also has a urethral Shearer.  Skin/Integumen: No rashes, no cyanosis, no edema  Neuro : Bilateral lower extremity weakness noted, he is oriented to self  Medications     sodium chloride 0.9% (bag) 100 mL/hr at 06/08/20 0615       apixaban ANTICOAGULANT  2.5 mg Oral BID     finasteride  5 mg Oral Daily     glycopyrrolate  1 mg Oral TID     perampanel  4 mg Oral At Bedtime     polyethylene glycol  17 g Oral QAM     senna-docusate  1 tablet Oral BID     simvastatin  10 mg Oral At Bedtime     sodium chloride (PF)  3 mL Intracatheter Q8H     valproic acid  1,000 mg Oral At Bedtime     valproic acid  500 mg Oral QAM     zonisamide  300 mg Oral QAM       Data   Recent Labs   Lab 06/09/20  0807 06/08/20  0709 06/07/20  1136   WBC 8.2 8.6 13.8*   HGB 10.1* 11.2* 12.4*   MCV 99 99 99   * 112* 152   NA  --  140 141   POTASSIUM  --  3.8 3.9   CHLORIDE  --  110* 111*   CO2  --  25 26   BUN  --  18 21   CR  --  0.73 0.86   ANIONGAP  --  5 4   BREANN  --  8.3* 8.6   GLC  --  84 104*     Recent Labs   Lab 06/08/20  0709 06/07/20  1136   GLC 84 104*       Imaging:   No results found for this or any previous visit (from the past 24 hour(s)).

## 2020-06-09 NOTE — PLAN OF CARE
TM 99.5, other VSS. A&O x2-3, very slow to respond to questions. C/o pain at sp cath site with activity, relieved with rest. Repositioned every 2 hours, pulsate mattress in place. Coccyx & left ankle dressings CDI. Shearer and SP cath patent with clear yellow urine out. BS present x4. LS clear/diminished. C/o left eye dryness/itching- artificial tears ordered per comfort meds. New PIV placed per orders.

## 2020-06-09 NOTE — PLAN OF CARE
A&Ox2-3, baseline. VSS on RA ex soft BP. Denies pain/nausea. CBI clamped @10am. Urethral catheter flushed x2 per Urology, no clots. Suprapubic cath w/ yellow/clear & adequate UOP, villalta w/ minimal yellow/clear UOP. Mechanical soft diet + thin liquids, assistance w/ feeding. Blanchable redness to coccyx, mepilex CDI. L ankle wound w/ mepilex CDI. Continue monitoring hemoglobin & for hematuria. Urology following, discharge pending.

## 2020-06-10 VITALS
BODY MASS INDEX: 22.21 KG/M2 | RESPIRATION RATE: 16 BRPM | WEIGHT: 145.7 LBS | TEMPERATURE: 97.2 F | DIASTOLIC BLOOD PRESSURE: 47 MMHG | OXYGEN SATURATION: 97 % | SYSTOLIC BLOOD PRESSURE: 105 MMHG | HEART RATE: 59 BPM

## 2020-06-10 PROBLEM — S90.812A ABRASION OF LEFT FOOT: Status: ACTIVE | Noted: 2020-06-10

## 2020-06-10 LAB
ANION GAP SERPL CALCULATED.3IONS-SCNC: 8 MMOL/L (ref 3–14)
BUN SERPL-MCNC: 19 MG/DL (ref 7–30)
CALCIUM SERPL-MCNC: 8.3 MG/DL (ref 8.5–10.1)
CHLORIDE SERPL-SCNC: 108 MMOL/L (ref 94–109)
CO2 SERPL-SCNC: 24 MMOL/L (ref 20–32)
CREAT SERPL-MCNC: 0.72 MG/DL (ref 0.66–1.25)
ERYTHROCYTE [DISTWIDTH] IN BLOOD BY AUTOMATED COUNT: 14.6 % (ref 10–15)
GFR SERPL CREATININE-BSD FRML MDRD: >90 ML/MIN/{1.73_M2}
GLUCOSE SERPL-MCNC: 88 MG/DL (ref 70–99)
HCT VFR BLD AUTO: 30.1 % (ref 40–53)
HGB BLD-MCNC: 9.9 G/DL (ref 13.3–17.7)
MCH RBC QN AUTO: 32.1 PG (ref 26.5–33)
MCHC RBC AUTO-ENTMCNC: 32.9 G/DL (ref 31.5–36.5)
MCV RBC AUTO: 98 FL (ref 78–100)
PLATELET # BLD AUTO: 114 10E9/L (ref 150–450)
POTASSIUM SERPL-SCNC: 3.4 MMOL/L (ref 3.4–5.3)
RBC # BLD AUTO: 3.08 10E12/L (ref 4.4–5.9)
SODIUM SERPL-SCNC: 140 MMOL/L (ref 133–144)
WBC # BLD AUTO: 5.7 10E9/L (ref 4–11)

## 2020-06-10 PROCEDURE — 25000132 ZZH RX MED GY IP 250 OP 250 PS 637: Performed by: INTERNAL MEDICINE

## 2020-06-10 PROCEDURE — 36415 COLL VENOUS BLD VENIPUNCTURE: CPT | Performed by: INTERNAL MEDICINE

## 2020-06-10 PROCEDURE — 80048 BASIC METABOLIC PNL TOTAL CA: CPT | Performed by: INTERNAL MEDICINE

## 2020-06-10 PROCEDURE — 99239 HOSP IP/OBS DSCHRG MGMT >30: CPT | Performed by: HOSPITALIST

## 2020-06-10 PROCEDURE — 85027 COMPLETE CBC AUTOMATED: CPT | Performed by: INTERNAL MEDICINE

## 2020-06-10 RX ORDER — GINSENG 100 MG
CAPSULE ORAL
DISCHARGE
Start: 2020-06-10

## 2020-06-10 RX ADMIN — VALPROIC ACID 500 MG: 250 CAPSULE, LIQUID FILLED ORAL at 09:23

## 2020-06-10 RX ADMIN — SENNOSIDES AND DOCUSATE SODIUM 1 TABLET: 8.6; 5 TABLET ORAL at 09:10

## 2020-06-10 RX ADMIN — FINASTERIDE 5 MG: 5 TABLET, FILM COATED ORAL at 09:27

## 2020-06-10 RX ADMIN — GLYCOPYRROLATE 1 MG: 1 TABLET ORAL at 09:27

## 2020-06-10 RX ADMIN — POLYETHYLENE GLYCOL 3350 17 G: 17 POWDER, FOR SOLUTION ORAL at 09:22

## 2020-06-10 RX ADMIN — APIXABAN 2.5 MG: 2.5 TABLET, FILM COATED ORAL at 09:27

## 2020-06-10 RX ADMIN — ZONISAMIDE 300 MG: 100 CAPSULE ORAL at 09:25

## 2020-06-10 ASSESSMENT — ACTIVITIES OF DAILY LIVING (ADL)
ADLS_ACUITY_SCORE: 35
ADLS_ACUITY_SCORE: 35
ADLS_ACUITY_SCORE: 37
ADLS_ACUITY_SCORE: 37
ADLS_ACUITY_SCORE: 35

## 2020-06-10 NOTE — DISCHARGE INSTRUCTIONS
Suprapubic catheter to be changed once a month by a RN with 22 Fr straight catheter with 5 ccs in balloon. Lubricate catheter with KY jelly prior to insertion  Next suprapubic catheter exchange is due 7-10-20 (since it last exchanged on 6-10-20 at 1200 at [facility], station 88  by RN  Suprapubic catheter drsg to be changed every other day. Empty urine every 8 hours    Wound care for left ankle:   Wash site daily with soap/water, pat dry, apply bacitracin to site till  completely healed. Cover with Mepilex border (foam) dressing    Wound care for sacral area: V  Cleanse area daily with soap/water and cover with Mepilex sacral drsg to protect skin    Keep Rooke Boots on both low extremities, Reposition pt with pillows every 2 hours. Tranfers with a lift. Unable to ambulate due to Left side paralysis due to old CVA  Pt needs complete assistance with all ADL's including feeding

## 2020-06-10 NOTE — PLAN OF CARE
Patient is alert but disoriented to time and situation, slow to respond and whispers. VSS. C/o low back pain, gave PO Tylenol x2. Up with lift, turned and repositioned every 2 hours. Foam dressing to coccyx and left ankle. Bilateral rooke boots on. SP catheter with yellow cloudy urine, minimal UOP. On mechanical soft diet. R PIV SL.  Calls appropriately.

## 2020-06-10 NOTE — PLAN OF CARE
Patient discharged at 4:30 PM to Transferred to skilled nursing facility  IV was discontinued. Pain at time of discharge was 0/10. NO belongings or clothing for patient. Discharge instructions and packet given to EMS. At time of discharge, patient condition was stable and left the unit escorted by EMS streacher.

## 2020-06-10 NOTE — PROGRESS NOTES
Writer exchanged the present  20fr, 30cc balloon triple lumen suprapubic catheter with a 22 Bhutanese, 5cc balloon 2 way suprapubic catheter at 1200. Urologist Dr Cordova at bedside for reassurance and assist if needed. SP cath exchange successful and tolerated well by pt. urine remains yellow. No hematuria.   For discharge, Urology ordering Suprapubic catheter to be changed once a month by a RN with 22 Fr straight catheter with 5 ccs in balloon. Next suprapubic catheter exchange is thus due 7-10-20   Mepliex dressing on both sacral (mepilex sacral drsg)  and left ankle (mepilex border drsg) changed at 1230. Left ankle has red excoriation, no edema. Sacral area has blanchable erythema with intact skin. Writer added wound care orders on AVS for above

## 2020-06-10 NOTE — CONSULTS
ROSALIA Consult/Discharge Note:    Care Transition Initial Assessment - ROSALIA     Met with: Family    Active Problems:    Hematuria    Abrasion of left foot       DATA  Lives With: facility resident      Quality of Family Relationships: helpful, involved, supportive  Description of Support System: Supportive, Involved  Who is your support system?: Sibling(s), Facility resident(s)/Staff  Support Assessment: Adequate family and caregiver support, Adequate social supports.   Identified issues/concerns regarding health management: Discharge Planning.  Resources List: Skilled Nursing Facility  Other Resources: Transportation Services  Quality of Family Relationships: helpful, involved, supportive  Transportation Anticipated: agency    ASSESSMENT  Cognitive Status:  Disoriented to time and situation.  Concerns to be addressed: Patient is a 75 year old male that was admitted to the hospital on 6/7/20 with a primary diagnosis of hematuria.  Reviewed chart and spoke with patient's brother Jeff regarding discharge planning.  Patient has a bed hold at [facility] and is able to return to the facility.  Per physician, patient is able to return back to the facility today.  SW placed call to [facility] to arrange for a stretcher ride at 4:00 today.  Updated Jeff and he is in agreement.  Updated facility and faxed the orders.  PCS form completed, faxed to [facility] and provided to [facility].  Patient in need of stretcher due to hx of stroke and weakness.      PLAN  Financial costs for the patient includes:  [employer]  Patient given options and choices for discharge: Patient has bed hold at [facility].  Patient/family is agreeable to the plan? Yes  Transportation/person available to transport on day of discharge: [facility] at 4:00 today.   Patient Goals and Preferences: [facility]  Patient anticipates discharging to: [facility]    CLARKE Aguirre, LGSW  [phone]  [facility]  [facility]

## 2020-06-10 NOTE — DISCHARGE SUMMARY
[facility]  Hospitalist Discharge Summary      Date of Admission:  6/7/2020  Date of Discharge:  6/10/2020  4:31 PM  Discharging Provider: Rell Goldman MD      Discharge Diagnoses   1. Hematuria secondary to traumatic suprapubic catheter exchange and on apixaban  2. Acute on chronic anemia - see below  3. Multiple chronic conditions as delineated in hospital course below    Follow-ups Needed After Discharge   Follow-up Appointments     Follow Up and recommended labs and tests      PCP or LTC MD for hospital follow up - CBC if hematuria returns           Unresulted Labs Ordered in the Past 30 Days of this Admission     No orders found from 5/8/2020 to 6/8/2020.      These results will be followed up by NA    Discharge Disposition   Discharged to long-term care facility  Condition at discharge: Stable      Hospital Course   Josiah Toney is a 75 year old male who was admitted on 6/7/2020.  Patient has extensive urologic history which include BPH with urinary retention status post suprapubic catheter placement since 2017, he is also on oral anticoagulation with Eliquis for DVT presented to the hospital with a hematuria while nursing staff at long-term care facility because exchanging his suprapubic catheter.     Hematuria secondary to trauma from suprapubic Shearer catheter exchange while patient on Eliquis  CT ab/pel on adm reported moderate amount of blood in bladder. Hgb initially 12.4 but with mild hematuria - Urology started CBI. Urine output cleared and apixaban resumed 6/9.  Urethral catheter removed per urology. Suprapubic in place.   - 6/10 urine not bloody, hgb 9.9 was 10.1 yesterday  - Urology and RN performed SP catheter switch - uneventful and successful.  - LTC MD follow up and CBC in several days or sooner if indicated    Chronic anemia, with acute blood loss anemia   Stabilized as above. Baseline 11-12 range earlier this year.  - PTA hemoglobin was 12.4, his hemoglobin has  dropped to 10.1 then today 9.9 6/10.    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 PTA regimen to continue.     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    - Continued PTA meds once reviewed by pharmacy     History of DVT  - Continue PTA Eliquis 2.5 mg BID and monitor platelet count closely.     Dysphagia  Speech articulation D/O  Last admission Speech recommended NPO with feeding tube.  After discussion with patient's brother (Jeff) it was indicated that they would want to take the risk of aspiration and allow patient to eat without restrictions. Chest Xray negative.   - Appreciate speech eval and recommendation for dysphagia diet with thickened liquids. Family is aware and wants mechanical soft diet with regular liquids.  - Will respect wishes of family and continue mechanical soft diet with regular liquids despite risk of aspiration.     BPH with history of urinary retention s/p cystolitholapaxy of bladder and suprapubic cystotomy with tube placement (1/2017)  Follows with Urology at [facility].    - Continued PTA Proscar 5 mg daily.       Mixed hyperlipidemia    Continued PTA simvastatin 10 mg at bedtime.       Chronic sialadenitis  - Continued PTA Robinul 1 mg TID.        Consultations This Hospital Stay   UROLOGY IP CONSULT  SOCIAL WORK IP CONSULT  PHYSICAL THERAPY ADULT IP CONSULT  OCCUPATIONAL THERAPY ADULT IP CONSULT    Code Status   Full Code    Time Spent on this Encounter   I, Rell Goldman MD, personally saw the patient today and spent greater than 30 minutes discharging this patient.       Rell Goldman MD  [facility]  ______________________________________________________________________    Physical Exam   Vital Signs: Temp: 97.2  F (36.2  C) Temp src: Oral BP: 105/47 Pulse: 59   Resp: 16 SpO2: 97 % O2 Device: None  (Room air)    Weight: 145 lbs 11.2 oz    Gen: NAD, pleasant  HEENT: Normocephalic, EOMI, MMM  Resp: no crackles,  no wheezes, no increased work of resp  CV: S1S2 heard, reg rhythm, reg rate, no pitting pedal edema  Abdo: soft, nontender, nondistended, bowel sounds present  Ext: calves nontender, well perfused  Neuro: awake and alert, responding appropriately but with what appears to be baseline delayed speech due to prior impairment and CVA history. Lower ext weakness at baseline.         Primary Care Physician   YOSHI GAFFNEY    Discharge Orders      Additional Discharge Instructions    For Suprapubic catheter    Needs Suprapubic tube change every month with 22 Fr straight catheter with 5 ccs in balloon   Changed on 6/10/2020           General info for SNF    Length of Stay Estimate: Long Term Care  Condition at Discharge: Stable  Level of care:skilled   Rehabilitation Potential: Fair  Admission H&P remains valid and up-to-date: Yes  Recent Chemotherapy: N/A  Use Nursing Home Standing Orders: Yes     Mantoux instructions    Give two-step Mantoux (PPD) Per Facility Policy Yes     Reason for your hospital stay    Hematuria during suprapubic catheter exchange     Daily weights    Call Provider for weight gain of more than 2 pounds per day or 5 pounds per week.     Follow Up and recommended labs and tests    PCP or LTC MD for hospital follow up - CBC if hematuria returns     Activity - Up with nursing assistance     Physical Therapy Adult Consult    Evaluate and treat as clinically indicated.    Reason:  Continue prior cares     Occupational Therapy Adult Consult    Evaluate and treat as clinically indicated.    Reason:  Continue prior regimen     Fall precautions     Advance Diet as Tolerated    Follow this diet upon discharge: Orders Placed This Encounter      Room Service      Mechanical/Dental Soft Diet       Significant Results and Procedures   Most Recent 3 CBC's:  Recent Labs   Lab Test 06/10/20  0733  06/09/20  0807 06/08/20  0709   WBC 5.7 8.2 8.6   HGB 9.9* 10.1* 11.2*   MCV 98 99 99   * 106* 112*     Most Recent 3 BMP's:  Recent Labs   Lab Test 06/10/20  0733 06/08/20  0709 06/07/20  1136    140 141   POTASSIUM 3.4 3.8 3.9   CHLORIDE 108 110* 111*   CO2 24 25 26   BUN 19 18 21   CR 0.72 0.73 0.86   ANIONGAP 8 5 4   BREANN 8.3* 8.3* 8.6   GLC 88 84 104*     Most Recent 3 Hemoglobins:  Recent Labs   Lab Test 06/10/20  0733 06/09/20  0807 06/08/20  0709   HGB 9.9* 10.1* 11.2*   ,   Results for orders placed or performed during the hospital encounter of 06/07/20   CT Abdomen Pelvis w Contrast    Narrative    CT ABDOMEN AND PELVIS WITH CONTRAST 6/7/2020 12:33 PM     HISTORY: Suprapubic catheter exchange yesterday, now abdominal pain  with significant hematuria.    COMPARISON: January 15, 2020    TECHNIQUE: Volumetric helical acquisition of CT images from the lung  bases through the symphysis pubis after the administration of 81 mL  Isovue-370 intravenous contrast. Radiation dose for this scan was  reduced using automated exposure control, adjustment of the mA and/or  kV according to patient size, or iterative reconstruction technique.    FINDINGS: Hyperdense layering material in the bladder compatible with  blood in the bladder. No definite active extravasation.  Low-attenuation subcentimeter liver lesion(s) compatible with benign  cysts or other benign lesions. No specific evaluation or follow-up is  recommended in a low risk patient. Adrenal glands, kidneys, spleen,  and pancreas demonstrate no worrisome focal lesion. There are no  dilated loops of small intestine or large bowel to suggest ileus or  obstruction. Small amount of free fluid. No free air. Normal caliber  aorta. Mild atelectasis and/or fibrosis at the lung bases. Survey of  the visualized bony structures demonstrates no destructive bony  lesions.      Impression    IMPRESSION: Moderate amount of hemorrhage within the bladder. No  active  extravasation.     BARRETT HERNANDEZ MD       Discharge Medications   Discharge Medication List as of 6/10/2020  3:37 PM      START taking these medications    Details   bacitracin 500 UNIT/GM OINT Apply topically every hour as needed for other (topical dermatitis)Transitional         CONTINUE these medications which have NOT CHANGED    Details   acetaminophen (TYLENOL) 325 MG tablet Take 650 mg by mouth every 6 hours as needed for mild pain, Historical      apixaban ANTICOAGULANT (ELIQUIS) 2.5 MG tablet Take 2.5 mg by mouth 2 times daily Give twice daily between 0388-7509 and 1600-2000, Historical      calcium carbonate (TUMS) 500 MG chewable tablet Take 1 chew tab by mouth 2 times daily as needed , Historical      finasteride (PROSCAR) 5 MG tablet Take 5 mg by mouth daily Give between 8978-9084, Historical      glycopyrrolate (ROBINUL) 1 MG tablet Take 1 mg by mouth 3 times daily Give three times daily between 8549-9478, 2302-4601, and 6409-6913, Historical      oxybutynin (DITROPAN) 5 MG tablet Take 5 mg by mouth 2 times daily Give twice daily between 4041-3359 and 1600-2000, Historical      perampanel (FYCOMPA) 4 MG tablet Take 1 tablet (4 mg) by mouth At Bedtime, Disp-30 tablet, R-0, Local Print      polyethylene glycol (MIRALAX) powder Take 17 g by mouth every morning Mix in 8 oz of liquid. Give between 3725-0503, Historical      senna-docusate (SENOKOT-S/PERICOLACE) 8.6-50 MG tablet Take 1 tablet by mouth 2 times daily Give twice daily between 9777-5402 and 1600-2000, Historical      simvastatin (ZOCOR) 10 MG tablet Take 10 mg by mouth At Bedtime, Historical      !! valproic acid (DEPAKENE) 250 MG capsule Take 500 mg by mouth every morning Give between 3253-5196, Historical      !! valproic acid (DEPAKENE) 250 MG capsule Take 1,000 mg by mouth At Bedtime, Historical      zonisamide (ZONEGRAN) 100 MG capsule Take 300 mg by mouth every morning Give between 0795-4846, Historical       !! - Potential duplicate  medications found. Please discuss with provider.        Allergies   No Known Allergies

## 2020-06-10 NOTE — PLAN OF CARE
Patient is disoriented to time and situation. Patient very quite and slow to respond. VSS. No complains of pain at this time. Up with lift. Turned and repoed every 2 hours.  Foam dressing to coccyx and left ankle.  SP catheter with yellow cloudy urine, small amounts of urine output. R PIV SL.  Calls appropriately. Discharge possible in AM if no active bleeding

## 2020-06-10 NOTE — PROGRESS NOTES
[facility]    Medicine Progress Note - Hospitalist Service       Date of Admission:  6/7/2020  Assessment & Plan    Josiah Toney is a 75 year old male who was admitted on 6/7/2020.  Patient has extensive urologic history which include BPH with urinary retention status post suprapubic catheter placement since 2017, he is also on oral anticoagulation with Eliquis for DVT presented to the hospital with a hematuria while nursing staff at long-term care facility because exchanging his suprapubic catheter.     Hematuria secondary to trauma from suprapubic Shearer catheter exchange while patient on Eliquis  CT ab/pel on adm reported moderate amount of blood in bladder. Hgb initially 12.4 but with mild hematuria - Urology started CBI. Urine output cleared and apixaban resumed 6/9.  Urethral catheter removed per urology. Suprapubic in place.   - 6/10 urine not bloody, hgb 9.9 was 10.1 yesterday  - Will await Urology recs today given note reports possible catheter change.    Chronic anemia, with acute blood loss anemia   Stabilized as above. Baseline 11-12 range earlier this year.  - PTA hemoglobin was 12.4, his hemoglobin has dropped to 10.1 then today 9.9 6/10.    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 PTA regimen to continue.     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    - Continue PTA meds once reviewed by pharmacy     History of DVT  - Continue PTA Eliquis 2.5 mg BID and monitor platelet count closely.     Dysphagia  Speech articulation D/O  Last admission Speech recommended NPO with feeding tube.  After discussion with patient's brother (Jeff) it was indicated that they would want to take the risk of aspiration and allow patient to eat without restrictions. Chest Xray negative.   - Appreciate speech eval and recommendation for  dysphagia diet with thickened liquids. Family is aware and wants mechanical soft diet with regular liquids.  - Will respect wishes of family and continue mechanical soft diet with regular liquids despite risk of aspiration.     BPH with history of urinary retention s/p cystolitholapaxy of bladder and suprapubic cystotomy with tube placement (1/2017)  Follows with Urology at [facility].    - Continue PTA Proscar 5 mg daily.       Mixed hyperlipidemia    Continue PTA simvastatin 10 mg at bedtime.       Chronic sialadenitis  - Continue PTA Robinul 1 mg TID.        Diet: Mechanical/Dental Soft Diet  Room Service    DVT Prophylaxis: apixaban continued  Shearer Catheter: not present  Code Status: Full Code           Disposition Plan   Expected discharge: pending urology recs/eval - appears appropriate for return to LTC otherwise  Entered: Rell Goldman MD 06/10/2020, 10:22 AM       The patient's care was discussed with the Bedside Nurse, Care Coordinator/ and Patient.    Rell Goldman MD  Hospitalist Service  [facility]    ______________________________________________________________________    Interval History   Seen and examined. No new complaints. L foot still sore - a little better though overall. Denies f/c/sob. Urine remains yellow/clear. Hgb 9.9 from 10.1. Will await urology eval/recs today but otherwise ready for discharge.    Data reviewed today: I reviewed all medications, new labs and imaging results over the last 24 hours. I personally reviewed no images or EKG's today.    Physical Exam   Vital Signs: Temp: 96.8  F (36  C) Temp src: Oral BP: 107/49 Pulse: 53   Resp: 16 SpO2: 97 % O2 Device: None (Room air)    Weight: 145 lbs 11.2 oz    Gen: NAD, pleasant  HEENT: Normocephalic, EOMI, MMM  Resp: no crackles,  no wheezes, no increased work of resp  CV: S1S2 heard, reg rhythm, reg rate, no pitting pedal edema  Abdo: soft, nontender, nondistended, bowel sounds present  Ext: calves  nontender, well perfused  Neuro: awake and alert, responding appropriately but with what appears to be baseline delayed speech due to chronic med probs. Lower ext weakness at baseline.      Data   Recent Labs   Lab 06/10/20  0733 06/09/20  0807 06/08/20  0709 06/07/20  1136   WBC 5.7 8.2 8.6 13.8*   HGB 9.9* 10.1* 11.2* 12.4*   MCV 98 99 99 99   * 106* 112* 152     --  140 141   POTASSIUM 3.4  --  3.8 3.9   CHLORIDE 108  --  110* 111*   CO2 24  --  25 26   BUN 19  --  18 21   CR 0.72  --  0.73 0.86   ANIONGAP 8  --  5 4   BREANN 8.3*  --  8.3* 8.6   GLC 88  --  84 104*     No results found for this or any previous visit (from the past 24 hour(s)).

## 2020-06-25 ENCOUNTER — HOSPITAL ENCOUNTER (EMERGENCY)
Facility: CLINIC | Age: 75
Discharge: HOME | End: 2020-06-25
Attending: EMERGENCY MEDICINE | Admitting: EMERGENCY MEDICINE
Payer: COMMERCIAL

## 2020-06-25 VITALS
HEART RATE: 74 BPM | RESPIRATION RATE: 16 BRPM | BODY MASS INDEX: 22.87 KG/M2 | SYSTOLIC BLOOD PRESSURE: 98 MMHG | DIASTOLIC BLOOD PRESSURE: 53 MMHG | OXYGEN SATURATION: 97 % | WEIGHT: 145.72 LBS | TEMPERATURE: 99 F | HEIGHT: 67 IN

## 2020-06-25 DIAGNOSIS — T83.090A OBSTRUCTION OF SUPRAPUBIC CATHETER, INITIAL ENCOUNTER (H): ICD-10-CM

## 2020-06-25 DIAGNOSIS — R31.0 GROSS HEMATURIA: ICD-10-CM

## 2020-06-25 LAB
ANION GAP SERPL CALCULATED.3IONS-SCNC: 1 MMOL/L (ref 3–14)
BUN SERPL-MCNC: 25 MG/DL (ref 7–30)
CALCIUM SERPL-MCNC: 8.9 MG/DL (ref 8.5–10.1)
CHLORIDE SERPL-SCNC: 110 MMOL/L (ref 94–109)
CO2 SERPL-SCNC: 32 MMOL/L (ref 20–32)
CREAT SERPL-MCNC: 0.82 MG/DL (ref 0.66–1.25)
ERYTHROCYTE [DISTWIDTH] IN BLOOD BY AUTOMATED COUNT: 14.5 % (ref 10–15)
GFR SERPL CREATININE-BSD FRML MDRD: 87 ML/MIN/{1.73_M2}
GLUCOSE SERPL-MCNC: 112 MG/DL (ref 70–99)
HCT VFR BLD AUTO: 32.6 % (ref 40–53)
HGB BLD-MCNC: 10.2 G/DL (ref 13.3–17.7)
INR PPP: 1.34 (ref 0.86–1.14)
MCH RBC QN AUTO: 31.1 PG (ref 26.5–33)
MCHC RBC AUTO-ENTMCNC: 31.3 G/DL (ref 31.5–36.5)
MCV RBC AUTO: 99 FL (ref 78–100)
PLATELET # BLD AUTO: 159 10E9/L (ref 150–450)
POTASSIUM SERPL-SCNC: 3.5 MMOL/L (ref 3.4–5.3)
RBC # BLD AUTO: 3.28 10E12/L (ref 4.4–5.9)
SODIUM SERPL-SCNC: 143 MMOL/L (ref 133–144)
WBC # BLD AUTO: 10.5 10E9/L (ref 4–11)

## 2020-06-25 PROCEDURE — 85610 PROTHROMBIN TIME: CPT | Performed by: EMERGENCY MEDICINE

## 2020-06-25 PROCEDURE — 80048 BASIC METABOLIC PNL TOTAL CA: CPT | Performed by: EMERGENCY MEDICINE

## 2020-06-25 PROCEDURE — 99283 EMERGENCY DEPT VISIT LOW MDM: CPT | Mod: 25

## 2020-06-25 PROCEDURE — 36415 COLL VENOUS BLD VENIPUNCTURE: CPT | Performed by: EMERGENCY MEDICINE

## 2020-06-25 PROCEDURE — 51705 CHANGE OF BLADDER TUBE: CPT

## 2020-06-25 PROCEDURE — 85027 COMPLETE CBC AUTOMATED: CPT | Performed by: EMERGENCY MEDICINE

## 2020-06-25 RX ORDER — LIDOCAINE HYDROCHLORIDE 20 MG/ML
JELLY TOPICAL
Status: DISCONTINUED
Start: 2020-06-25 | End: 2020-06-26 | Stop reason: HOSPADM

## 2020-06-25 ASSESSMENT — MIFFLIN-ST. JEOR: SCORE: 1364.63

## 2020-06-25 NOTE — ED AVS SNAPSHOT
Emergency Department  [address] [address]  Phone:  [phone]  Fax:  [phone]                                    Josiah Toney   MRN: [id number]    Department:   Emergency Department   Date of Visit:  6/25/2020           After Visit Summary Signature Page    I have received my discharge instructions, and my questions have been answered. I have discussed any challenges I see with this plan with the nurse or doctor.    ..........................................................................................................................................  Patient/Patient Representative Signature      ..........................................................................................................................................  Patient Representative Print Name and Relationship to Patient    ..................................................               ................................................  Date                                   Time    ..........................................................................................................................................  Reviewed by Signature/Title    ...................................................              ..............................................  Date                                               Time          22EPIC Rev 08/18

## 2020-06-25 NOTE — ED NOTES
Bed: ED30  Expected date:   Expected time:   Means of arrival:   Comments:  Samantha 2 74M blood in catheter - ETA 7min

## 2020-06-25 NOTE — ED PROVIDER NOTES
History     Chief Complaint:  Catheter Problem    The history is provided by the patient. The history is limited by the condition of the patient.      Josiah Toney is a 75 year old male, on Eliquis, with a history of hematuria, catheter associated UTI, urinary retention, and nephrolithiasis who presents to the ED for evaluation of a catheter problem after it was changed out earlier today. He has blood in the tube and no urine. The patient arrived to the ED via EMS and is describing some pain.  History limited due to limited speech.    Allergies:  No known allergies     Medications:    Eliquis  Bacitracin  Proscar  Robinul  Ditropan  Fycompa  Miralax  Pericolace  Zocor  Depakene  Zonegran     Past Medical History:    Left foot abrasion  Hematuria  Right CVA with left hemiparesis  Toxic encephalopathy  Urinary retention, S/P suprapubic catheter  Catheter-associated UTI  [place] myoclonus epilepsy  Cerebral palsy  Depression  DVT  Dyslipidemia  Nephrolithiasis  Patent foramen ovale  Unspecific cerebral artery occlusion with cerebral infarction  Wheelchair bound  Osteoarthritis  Myopia of both eyes  Cataracts  Sialorrhea  Cognitive deficits  Hearing loss of both ears  Osteoporosis  Hyperlipidemia  Gait disorder  Speech articulation disorder  Thyroid masses  Left hand paresthesia  Small bowel obstruction    Past Surgical History:    Cystolithopaxy and suprapubic cystotmy with catheter placement  Lithotripsy  Tracheostomy  Cystoscopy (bladder)  Cystoscopy suprapubic (abdomen)    Family History:    No past pertinent family history.    Social History:  Negative for tobacco use.  Negative for alcohol use.  Negative for drug use.  Marital Status:  Single [1]     Review of Systems   Unable to perform ROS: Other (poor historian and distress secondary to pain)       Physical Exam     Patient Vitals for the past 24 hrs:   BP Temp Temp src Pulse Heart Rate Resp SpO2 Height Weight   06/25/20 1706 -- -- -- -- -- -- -- 1.702 m  "(5' 7\") 66.1 kg (145 lb 11.6 oz)   06/25/20 1702 102/74 99  F (37.2  C) Temporal 93 93 16 96 % -- --       Physical Exam  Eyes:               Sclera white; Pupils are equal and round  ENT:                External ears and nares normal  CV:                  Rate as above with regular rhythm   Resp:               Breath sounds clear and equal bilaterally  GI:                   Abdomen is soft, non-tender, suprapubic tenderness  :                  Scant blood in tubing of catheter, no fluid in bag  MS:                  Moves all extremities  Skin:                Warm and dry  Neuro:             Moaning, occasional words    Emergency Department Course     Laboratory Results:  Laboratory findings were discussed with the patient who voiced understanding.     BMP: Glucose 112, Chloride 110, Anion gap 1 (L), o/w WNL (Creatinine: 0.82)  CBC: WBC: 10.5, HGB: 10.2 (L), PLT: 159  INR: 1.34 (H)    Procedures  Procedure Note - Suprapubic Catheter placement:  Indication: urinary retention  Performed by: Dr. Eleanor Moran MD    After obtaining verbal consent from the patient, I used sterile technique to remove the existing 22 F suprapubic catheter and immediately replaced it with a 22 F suprapubic catheter.  Significant urine came out from the abdominal wall after removal of the catheter.  The new catheter passed easily into the bladder through the existing track without meeting any significant resistance, and urine returned.  The catheter balloon was inflated with 5mL of sterile saline, and noted to be secure in bladder.  Urine did not immediately return into bag which was likely due to bladder decompressing through the abdominal wall prior to placement.  There were no evident complications, and the patient tolerated this procedure well.  Observed for urine flow into bag.    Emergency Department Course:  Past medical records, nursing notes, and vitals reviewed.    1710 I performed an exam of the patient as documented above. "     1740 I rechecked the patient and performed the suprapubic catheter placement as seen above.     1940 I rechecked the patient and discussed plan of care.     Findings and plan explained to the Patient. Patient discharged home with instructions regarding supportive care, medications, and reasons to return. The importance of close follow-up was reviewed.     I personally reviewed the laboratory and imaging results with the Patient and answered all related questions prior to discharge.     Impression & Plan     Medical Decision Making:  Josiah Toney is here for evaluation of a clogged suprapubic catheter. Bladder scan showed over 100 mls. Attempts to flush this did not result in urine output. Suprapubic catheter was changed at the bedside. It was also not draining initially. This was flushed and return of gross hematuria was noted. Upon chart review this a chronic problem for which he has had several visits and urology was planning on an outpatient CT urogram. Blood work was checked with no evidence for anemia, coagulopathy, or thrombocytopenia. Based on the chronicity of this and the overall stability of his workup, he is appropriate for ongoing outpatient management and transportation was arranged.     Diagnosis:    ICD-10-CM    1. Obstruction of suprapubic catheter, initial encounter (H)  T83.090A    2. Gross hematuria  R31.0        Disposition:  Discharged to home.    Scribe Disclosure:  IRell, am serving as a scribe at 5:18 PM on 6/25/2020 to document services personally performed by Eleanor Moran MD based on my observations and the provider's statements to me.   IAmadou, am serving as a scribe at 10:11 PM on 6/25/2020 to document services personally performed by Eleanor Moran MD based on my observations and the provider's statements to me.        Eleanor Moran MD  06/25/20 7945

## 2020-06-26 NOTE — ED NOTES
Checked patient and noted there has been no output from the newly inserted valorie-pubic catheter. Flushed using total of 60cc NS. Returned very bloody, clot-filled urine. Patient also reports lower abdominal pain/tenderness.

## 2020-06-26 NOTE — DISCHARGE INSTRUCTIONS
Flush catheter whenever it becomes clogged.    Follow up as planned with urology for hematuria.

## 2023-08-22 NOTE — PLAN OF CARE
A&Ox3. Bedfast. Tele SR. Lungs coarse. Droplet and contact precautions. Tolerating diet. Suprapubic catheter patent. Still incontinent. Repo and change PRN q 2. Plan is to discharge back to LTC when advanced on tamiflu regimen.     17:09 EDIT: ROSALIA following for pt to go back to LTC. Pt has roommate so LTC needing to find private room due to iso precautions. Has received all necessary doses of tamilfu to discharge.      This is in follow-up to my initial neurology Consult Note, of 8/20/22.  History as detailed therein.      Results of previously recommended lab tests:  Fe 17  11  TIBC  268  Ferritin  40  46    No orders found for the following previously recommended tests:  B12, folate, methylmalonic acid, homocysteine, RPR, copper, zinc.          EXAMINATION    Awake, alert.  Pt responds that he is in a hospital.  He provides his correct age and date of birth (but despite cueing does not provide the full number, 1935, for his year of birth, only provides "[birth year]").  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  Grossly normal comprehension, expression, articulation, prosody.  Confrontation visual fields grossly intact.  PERRL 3mm.  EOMI.      Moves all extremities in bed.  No grossly evident focal/lateralized motor deficits.      DTRs - as noted 8/20/23.